# Patient Record
Sex: FEMALE | Race: BLACK OR AFRICAN AMERICAN | Employment: UNEMPLOYED | URBAN - METROPOLITAN AREA
[De-identification: names, ages, dates, MRNs, and addresses within clinical notes are randomized per-mention and may not be internally consistent; named-entity substitution may affect disease eponyms.]

---

## 2013-01-16 LAB — PAP SMEAR, EXTERNAL: NORMAL

## 2017-01-19 ENCOUNTER — OFFICE VISIT (OUTPATIENT)
Dept: INTERNAL MEDICINE CLINIC | Age: 62
End: 2017-01-19

## 2017-01-19 VITALS
TEMPERATURE: 97.9 F | WEIGHT: 149 LBS | BODY MASS INDEX: 28.13 KG/M2 | HEIGHT: 61 IN | SYSTOLIC BLOOD PRESSURE: 104 MMHG | HEART RATE: 68 BPM | DIASTOLIC BLOOD PRESSURE: 64 MMHG

## 2017-01-19 DIAGNOSIS — L03.116 CELLULITIS OF LEFT FOOT: Primary | ICD-10-CM

## 2017-01-19 DIAGNOSIS — L29.9 ITCHING: ICD-10-CM

## 2017-01-19 DIAGNOSIS — M79.675 TOE PAIN, LEFT: ICD-10-CM

## 2017-01-19 PROCEDURE — 99213 OFFICE O/P EST LOW 20 MIN: CPT | Performed by: INTERNAL MEDICINE

## 2017-01-23 PROBLEM — L29.9 ITCHING: Status: ACTIVE | Noted: 2017-01-23

## 2017-01-23 PROBLEM — M79.675 TOE PAIN, LEFT: Status: ACTIVE | Noted: 2017-01-23

## 2017-01-23 ASSESSMENT — ENCOUNTER SYMPTOMS: COLOR CHANGE: 1

## 2017-02-07 ENCOUNTER — OFFICE VISIT (OUTPATIENT)
Dept: INTERNAL MEDICINE CLINIC | Age: 62
End: 2017-02-07

## 2017-02-07 VITALS
SYSTOLIC BLOOD PRESSURE: 104 MMHG | TEMPERATURE: 97.8 F | RESPIRATION RATE: 12 BRPM | WEIGHT: 145.4 LBS | DIASTOLIC BLOOD PRESSURE: 68 MMHG | OXYGEN SATURATION: 98 % | BODY MASS INDEX: 27.45 KG/M2 | HEIGHT: 61 IN | HEART RATE: 87 BPM

## 2017-02-07 DIAGNOSIS — E78.5 HYPERLIPIDEMIA, UNSPECIFIED HYPERLIPIDEMIA TYPE: ICD-10-CM

## 2017-02-07 DIAGNOSIS — E11.42 TYPE 2 DIABETES MELLITUS WITH PERIPHERAL NEUROPATHY (HCC): ICD-10-CM

## 2017-02-07 DIAGNOSIS — L29.9 ITCHING: ICD-10-CM

## 2017-02-07 DIAGNOSIS — M25.422 ELBOW SWELLING, LEFT: ICD-10-CM

## 2017-02-07 DIAGNOSIS — I10 ESSENTIAL HYPERTENSION: Primary | ICD-10-CM

## 2017-02-07 DIAGNOSIS — L02.92 BOIL: ICD-10-CM

## 2017-02-07 PROCEDURE — 99214 OFFICE O/P EST MOD 30 MIN: CPT | Performed by: INTERNAL MEDICINE

## 2017-02-07 RX ORDER — LORATADINE 10 MG/1
10 TABLET ORAL DAILY
Qty: 30 TABLET | Refills: 1 | Status: SHIPPED | OUTPATIENT
Start: 2017-02-07 | End: 2018-04-30

## 2017-02-07 RX ORDER — CEPHALEXIN 500 MG/1
500 CAPSULE ORAL 2 TIMES DAILY
Qty: 20 CAPSULE | Refills: 0 | Status: SHIPPED | OUTPATIENT
Start: 2017-02-07 | End: 2017-02-17

## 2017-02-09 ASSESSMENT — ENCOUNTER SYMPTOMS
RHINORRHEA: 0
COUGH: 0
SORE THROAT: 0
ABDOMINAL PAIN: 0
SINUS PRESSURE: 0
CONSTIPATION: 0
DIARRHEA: 0
VOMITING: 0
SHORTNESS OF BREATH: 0
NAUSEA: 0
CHEST TIGHTNESS: 0
WHEEZING: 0
BLOOD IN STOOL: 0

## 2017-02-16 LAB — DIABETIC RETINOPATHY: NORMAL

## 2017-02-21 ENCOUNTER — OFFICE VISIT (OUTPATIENT)
Dept: INTERNAL MEDICINE CLINIC | Age: 62
End: 2017-02-21

## 2017-02-21 VITALS
DIASTOLIC BLOOD PRESSURE: 68 MMHG | RESPIRATION RATE: 12 BRPM | BODY MASS INDEX: 27.08 KG/M2 | HEIGHT: 61 IN | OXYGEN SATURATION: 98 % | HEART RATE: 74 BPM | TEMPERATURE: 97.8 F | SYSTOLIC BLOOD PRESSURE: 102 MMHG | WEIGHT: 143.4 LBS

## 2017-02-21 DIAGNOSIS — M79.89 SWELLING OF RIGHT HAND: ICD-10-CM

## 2017-02-21 DIAGNOSIS — M79.89 ARM SWELLING: Primary | ICD-10-CM

## 2017-02-21 PROCEDURE — 99213 OFFICE O/P EST LOW 20 MIN: CPT | Performed by: INTERNAL MEDICINE

## 2017-02-27 DIAGNOSIS — E11.42 TYPE 2 DIABETES MELLITUS WITH PERIPHERAL NEUROPATHY (HCC): ICD-10-CM

## 2017-02-27 RX ORDER — SITAGLIPTIN 100 MG/1
TABLET, FILM COATED ORAL
Qty: 90 TABLET | Refills: 1 | Status: SHIPPED | OUTPATIENT
Start: 2017-02-27 | End: 2017-08-26 | Stop reason: SDUPTHER

## 2017-03-12 ENCOUNTER — TELEPHONE (OUTPATIENT)
Dept: INTERNAL MEDICINE CLINIC | Age: 62
End: 2017-03-12

## 2017-03-12 DIAGNOSIS — E78.5 HYPERLIPIDEMIA, UNSPECIFIED HYPERLIPIDEMIA TYPE: ICD-10-CM

## 2017-03-12 DIAGNOSIS — E11.42 TYPE 2 DIABETES MELLITUS WITH PERIPHERAL NEUROPATHY (HCC): Primary | ICD-10-CM

## 2017-03-13 RX ORDER — PIOGLITAZONEHYDROCHLORIDE 30 MG/1
TABLET ORAL
Qty: 90 TABLET | Refills: 1 | Status: SHIPPED | OUTPATIENT
Start: 2017-03-13 | End: 2017-09-09 | Stop reason: SDUPTHER

## 2017-03-13 RX ORDER — GLIMEPIRIDE 4 MG/1
TABLET ORAL
Qty: 180 TABLET | Refills: 1 | Status: SHIPPED | OUTPATIENT
Start: 2017-03-13 | End: 2017-09-09 | Stop reason: SDUPTHER

## 2017-03-13 RX ORDER — ROSUVASTATIN CALCIUM 20 MG/1
TABLET, COATED ORAL
Qty: 90 TABLET | Refills: 1 | Status: SHIPPED | OUTPATIENT
Start: 2017-03-13 | End: 2017-03-13 | Stop reason: SDUPTHER

## 2017-03-13 RX ORDER — GABAPENTIN 300 MG/1
CAPSULE ORAL
Qty: 90 CAPSULE | Refills: 1 | Status: SHIPPED | OUTPATIENT
Start: 2017-03-13 | End: 2017-09-09 | Stop reason: SDUPTHER

## 2017-03-13 RX ORDER — ROSUVASTATIN CALCIUM 20 MG/1
TABLET, COATED ORAL
Qty: 90 TABLET | Refills: 1 | Status: SHIPPED | OUTPATIENT
Start: 2017-03-13 | End: 2017-09-09 | Stop reason: SDUPTHER

## 2017-03-13 RX ORDER — GLIMEPIRIDE 4 MG/1
TABLET ORAL
Qty: 180 TABLET | Refills: 1 | Status: SHIPPED | OUTPATIENT
Start: 2017-03-13 | End: 2017-03-13 | Stop reason: SDUPTHER

## 2017-03-13 RX ORDER — LISINOPRIL 30 MG/1
TABLET ORAL
Qty: 90 TABLET | Refills: 1 | Status: SHIPPED | OUTPATIENT
Start: 2017-03-13 | End: 2017-09-09 | Stop reason: SDUPTHER

## 2017-08-26 DIAGNOSIS — E11.42 TYPE 2 DIABETES MELLITUS WITH PERIPHERAL NEUROPATHY (HCC): ICD-10-CM

## 2017-08-28 RX ORDER — SITAGLIPTIN 100 MG/1
TABLET, FILM COATED ORAL
Qty: 90 TABLET | Refills: 0 | Status: SHIPPED | OUTPATIENT
Start: 2017-08-28 | End: 2017-11-26 | Stop reason: SDUPTHER

## 2017-09-09 DIAGNOSIS — E11.42 TYPE 2 DIABETES MELLITUS WITH PERIPHERAL NEUROPATHY (HCC): ICD-10-CM

## 2017-09-09 DIAGNOSIS — E78.5 HYPERLIPIDEMIA, UNSPECIFIED HYPERLIPIDEMIA TYPE: ICD-10-CM

## 2017-09-12 ENCOUNTER — OFFICE VISIT (OUTPATIENT)
Dept: INTERNAL MEDICINE CLINIC | Age: 62
End: 2017-09-12

## 2017-09-12 VITALS
HEART RATE: 80 BPM | HEIGHT: 61 IN | OXYGEN SATURATION: 98 % | RESPIRATION RATE: 12 BRPM | BODY MASS INDEX: 27.3 KG/M2 | SYSTOLIC BLOOD PRESSURE: 130 MMHG | DIASTOLIC BLOOD PRESSURE: 70 MMHG | WEIGHT: 144.6 LBS | TEMPERATURE: 98.3 F

## 2017-09-12 DIAGNOSIS — M79.89 LEFT ARM SWELLING: Primary | ICD-10-CM

## 2017-09-12 DIAGNOSIS — L29.9 ITCHING: ICD-10-CM

## 2017-09-12 PROCEDURE — 99213 OFFICE O/P EST LOW 20 MIN: CPT | Performed by: INTERNAL MEDICINE

## 2017-09-12 RX ORDER — GLIMEPIRIDE 4 MG/1
TABLET ORAL
Qty: 180 TABLET | Refills: 0 | Status: SHIPPED | OUTPATIENT
Start: 2017-09-12 | End: 2017-12-11 | Stop reason: SDUPTHER

## 2017-09-12 RX ORDER — GABAPENTIN 300 MG/1
CAPSULE ORAL
Qty: 90 CAPSULE | Refills: 0 | Status: SHIPPED | OUTPATIENT
Start: 2017-09-12 | End: 2017-10-05

## 2017-09-12 RX ORDER — LISINOPRIL 30 MG/1
TABLET ORAL
Qty: 90 TABLET | Refills: 0 | Status: SHIPPED | OUTPATIENT
Start: 2017-09-12 | End: 2017-12-11 | Stop reason: SDUPTHER

## 2017-09-12 RX ORDER — ROSUVASTATIN CALCIUM 20 MG/1
TABLET, COATED ORAL
Qty: 90 TABLET | Refills: 0 | Status: SHIPPED | OUTPATIENT
Start: 2017-09-12 | End: 2018-03-11 | Stop reason: SDUPTHER

## 2017-09-12 RX ORDER — PIOGLITAZONEHYDROCHLORIDE 30 MG/1
TABLET ORAL
Qty: 90 TABLET | Refills: 0 | Status: SHIPPED | OUTPATIENT
Start: 2017-09-12 | End: 2017-10-05

## 2017-09-12 ASSESSMENT — PATIENT HEALTH QUESTIONNAIRE - PHQ9
SUM OF ALL RESPONSES TO PHQ9 QUESTIONS 1 & 2: 0
SUM OF ALL RESPONSES TO PHQ QUESTIONS 1-9: 0
1. LITTLE INTEREST OR PLEASURE IN DOING THINGS: 0
2. FEELING DOWN, DEPRESSED OR HOPELESS: 0

## 2017-09-14 PROBLEM — M79.89 LEFT ARM SWELLING: Status: ACTIVE | Noted: 2017-09-14

## 2017-09-14 ASSESSMENT — ENCOUNTER SYMPTOMS: COLOR CHANGE: 1

## 2017-10-04 ENCOUNTER — TELEPHONE (OUTPATIENT)
Dept: INTERNAL MEDICINE CLINIC | Age: 62
End: 2017-10-04

## 2017-10-05 ENCOUNTER — OFFICE VISIT (OUTPATIENT)
Dept: INTERNAL MEDICINE CLINIC | Age: 62
End: 2017-10-05

## 2017-10-05 VITALS
RESPIRATION RATE: 12 BRPM | BODY MASS INDEX: 27.11 KG/M2 | OXYGEN SATURATION: 99 % | HEART RATE: 82 BPM | HEIGHT: 61 IN | SYSTOLIC BLOOD PRESSURE: 122 MMHG | WEIGHT: 143.6 LBS | DIASTOLIC BLOOD PRESSURE: 64 MMHG | TEMPERATURE: 98.1 F

## 2017-10-05 DIAGNOSIS — I10 ESSENTIAL HYPERTENSION: ICD-10-CM

## 2017-10-05 DIAGNOSIS — Z11.59 NEED FOR HEPATITIS C SCREENING TEST: ICD-10-CM

## 2017-10-05 DIAGNOSIS — Z23 NEED FOR INFLUENZA VACCINATION: ICD-10-CM

## 2017-10-05 DIAGNOSIS — E78.2 MIXED HYPERLIPIDEMIA: ICD-10-CM

## 2017-10-05 DIAGNOSIS — E78.5 HYPERLIPIDEMIA, UNSPECIFIED HYPERLIPIDEMIA TYPE: ICD-10-CM

## 2017-10-05 DIAGNOSIS — E11.42 TYPE 2 DIABETES MELLITUS WITH PERIPHERAL NEUROPATHY (HCC): ICD-10-CM

## 2017-10-05 DIAGNOSIS — Z12.31 ENCOUNTER FOR SCREENING MAMMOGRAM FOR BREAST CANCER: ICD-10-CM

## 2017-10-05 DIAGNOSIS — E11.42 TYPE 2 DIABETES MELLITUS WITH PERIPHERAL NEUROPATHY (HCC): Primary | ICD-10-CM

## 2017-10-05 DIAGNOSIS — Z23 NEED FOR PROPHYLACTIC VACCINATION AGAINST STREPTOCOCCUS PNEUMONIAE (PNEUMOCOCCUS): ICD-10-CM

## 2017-10-05 LAB
A/G RATIO: 1.3 (ref 1.1–2.2)
ALBUMIN SERPL-MCNC: 4.5 G/DL (ref 3.4–5)
ALP BLD-CCNC: 73 U/L (ref 40–129)
ALT SERPL-CCNC: 21 U/L (ref 10–40)
ANION GAP SERPL CALCULATED.3IONS-SCNC: 13 MMOL/L (ref 3–16)
AST SERPL-CCNC: 27 U/L (ref 15–37)
BILIRUB SERPL-MCNC: 0.4 MG/DL (ref 0–1)
BUN BLDV-MCNC: 15 MG/DL (ref 7–20)
CALCIUM SERPL-MCNC: 10 MG/DL (ref 8.3–10.6)
CHLORIDE BLD-SCNC: 99 MMOL/L (ref 99–110)
CHOLESTEROL, TOTAL: 243 MG/DL (ref 0–199)
CO2: 28 MMOL/L (ref 21–32)
CREAT SERPL-MCNC: 1.1 MG/DL (ref 0.6–1.1)
GFR AFRICAN AMERICAN: >60
GFR NON-AFRICAN AMERICAN: 51
GLOBULIN: 3.6 G/DL
GLUCOSE BLD-MCNC: 124 MG/DL (ref 70–99)
HDLC SERPL-MCNC: 59 MG/DL (ref 40–60)
HEPATITIS C ANTIBODY INTERPRETATION: NORMAL
LDL CHOLESTEROL CALCULATED: 156 MG/DL
POTASSIUM SERPL-SCNC: 4.3 MMOL/L (ref 3.5–5.1)
SODIUM BLD-SCNC: 140 MMOL/L (ref 136–145)
TOTAL PROTEIN: 8.1 G/DL (ref 6.4–8.2)
TRIGL SERPL-MCNC: 141 MG/DL (ref 0–150)
VLDLC SERPL CALC-MCNC: 28 MG/DL

## 2017-10-05 PROCEDURE — 90732 PPSV23 VACC 2 YRS+ SUBQ/IM: CPT | Performed by: INTERNAL MEDICINE

## 2017-10-05 PROCEDURE — 90630 INFLUENZA, QUADV, 18-64 YRS, ID, PF, MICRO INJ, 0.1ML (FLUZONE QUADV, PF): CPT | Performed by: INTERNAL MEDICINE

## 2017-10-05 PROCEDURE — 99214 OFFICE O/P EST MOD 30 MIN: CPT | Performed by: INTERNAL MEDICINE

## 2017-10-05 PROCEDURE — 90471 IMMUNIZATION ADMIN: CPT | Performed by: INTERNAL MEDICINE

## 2017-10-05 PROCEDURE — 90472 IMMUNIZATION ADMIN EACH ADD: CPT | Performed by: INTERNAL MEDICINE

## 2017-10-05 ASSESSMENT — ENCOUNTER SYMPTOMS
DIARRHEA: 0
SORE THROAT: 0
NAUSEA: 0
ABDOMINAL PAIN: 0
SINUS PRESSURE: 0
WHEEZING: 0
CONSTIPATION: 0
SHORTNESS OF BREATH: 0
VOMITING: 0
BLOOD IN STOOL: 0
CHEST TIGHTNESS: 0
RHINORRHEA: 0
COUGH: 0

## 2017-10-05 NOTE — MR AVS SNAPSHOT
After Visit Summary             Daleen Sicard   10/5/2017 3:30 PM   Office Visit    Description:  Female : 1955   Provider:  Yuliya Corrales MD   Department:  [address] [place] [address] and Future Appointments         Below is a list of your follow-up and future appointments. This may not be a complete list as you may have made appointments directly with providers that we are not aware of or your providers may have made some for you. Please call your providers to confirm appointments. It is important to keep your appointments. Please bring your current insurance card, photo ID, co-pay, and all medication bottles to your appointment. If self-pay, payment is expected at the time of service. Your To-Do List     Future Orders Complete By Expires    Hepatitis C Antibody [RTG664 Custom]  10/5/2017 (Approximate) 10/5/2018    MATTHIAS Screening Bilateral [ Custom]  10/5/2017 (Approximate) 2018    Microalbumin / creatinine urine ratio [UNU167 Custom]  2017 10/5/2018    Follow-Up    Return in about 4 months (around 2018) for annual physical exam.         Information from Your Visit        Department     Name Address Phone Fax    Lance Reed [address] [facility]3], [address] [place] [id number] [phone]      You Were Seen for:         Comments    Type 2 diabetes mellitus with peripheral neuropathy [facility])   [[id number]]         Vital Signs     Blood Pressure Pulse Temperature Respirations Height Weight    122/64 (Site: Left Arm, Position: Sitting, Cuff Size: Medium Adult) 82 98.1 °F (36.7 °C) (Oral) 12 5' 1\" (1.549 m) 143 lb 9.6 oz (65.1 kg)    Oxygen Saturation Body Mass Index Smoking Status             99% 27.13 kg/m2 Never Smoker         Additional Information about your Body Mass Index (BMI)           Your BMI as listed above is considered overweight (25.0-29.9).  BMI is an · Some people get severe pain in the shoulder and have difficulty moving the arm where a shot was given. This happens very rarely. · Any medication can cause a severe allergic reaction. Such reactions from a vaccine are very rare, estimated at about 1 in a million doses, and would happen within a few minutes to a few hours after the vaccination. As with any medicine, there is a very remote chance of a vaccine causing a serious injury or death. The safety of vaccines is always being monitored. For more information, visit: www.cdc.gov/vaccinesafety/  What if there is a serious reaction? What should I look for? Look for anything that concerns you, such as signs of a severe allergic reaction, very high fever, or unusual behavior. Signs of a severe allergic reaction can include hives, swelling of the face and throat, difficulty breathing, a fast heartbeat, dizziness, and weakness. These would usually start a few minutes to a few hours after the vaccination. What should I do? If you think it is a severe allergic reaction or other emergency that can't wait, call 9-1-1 or get to the nearest hospital. Otherwise, call your doctor. Afterward, the reaction should be reported to the Vaccine Adverse Event Reporting System (VAERS). Your doctor might file this report, or you can do it yourself through the VAERS web site at www.vaers. hhs.gov, or by calling 8-447.698.3080. VAERS does not give medical advice. How can I learn more? · Ask your doctor. He or she can give you the vaccine package insert or suggest other sources of information. · Call your local or state health department.   · Contact the Centers for Disease Control and Prevention (CDC):  ¨ Call 6-280.193.8806 (1-800-CDC-INFO) or  ¨ Visit CDC's website at www.cdc.gov/vaccines  Vaccine Information Statement  PPSV Vaccine  (04/24/2015)  Department of Health and Human Services  Centers for Disease Control and Prevention Date Of Birth Sex Race Ethnicity Preferred Language    1955 Female Black Non-/Non  English      Problem List as of 10/5/2017  Date Reviewed: 9/13/2017                Left arm swelling    Toe pain, left    Itching    Rash    Type 2 diabetes mellitus with peripheral neuropathy ([facility]    Routine general medical examination at a health care facility    Trigger finger    Microalbuminuria    Thumb pain    Essential hypertension    Diabetes mellitus, type II ([facility]    Other and unspecified hyperlipidemia    Iron deficiency anemia    Low back pain      Your Goals as of 10/5/2017 at 4:13 PM                 Exercise    DM: Exercise 3x per week (30 min per time)     Notes    Patient Self-Management Goal for Chronic Condition  Goal: I will exercise for 45 minutes, 3-5 days per week. Barriers to success: none  Plan for overcoming my barriers: N/A     Confidence: 10/10  Date goal set: 07/07/2015  Date goal attained:          Weight    Weight (lb) < 130     Notes    2/7/2017: DM    Barriers to success: time constraints  Plan for overcoming my barriers: time management  Confidence: 10/10  Date goal set: 02/07/2017  Date goal attained:             Immunizations as of 10/5/2017     Name Date    Influenza Virus Vaccine 12/18/2014, 9/17/2013, 9/10/2012    Influenza Whole 10/18/2011    Influenza, Intradermal, Preservative free 10/26/2015    Influenza, Intradermal, Quadrivalent, Preservative Free 10/13/2016    Pneumococcal 13-valent Conjugate (Murel Saravia) 10/26/2015    Tdap (Boostrix, Adacel) 12/10/2012      Preventive Care        Date Due    Hepatitis C screening is recommended for all adults regardless of risk factors born between [facility] at least once (lifetime) who have never been tested.  1955    Pneumococcal Vaccine - Pneumovax for adults aged 19-64 years with: chronic heart disease, chronic lung disease, diabetes mellitus, alcoholism, chronic liver disease, or cigarette smoking. (1 of 1 - PPSV23) 8. Enter your e-mail address. You will receive e-mail notification when new information is available in [address] 9. Click Sign Up. You can now view your medical record. Additional Information  If you have questions, please contact the physician practice where you receive care. Remember, [employer] is NOT to be used for urgent needs. For medical emergencies, dial 911. For questions regarding your [employer] account call [phone]. If you have a clinical question, please call your doctor's office.

## 2017-10-05 NOTE — PATIENT INSTRUCTIONS
bacteria that can spread from person to person through close contact. It can cause ear infections, and it can also lead to more serious infections of the:  · Lungs (pneumonia),  · Blood (bacteremia), and  · Covering of the brain and spinal cord (meningitis). Meningitis can cause deafness and brain damage, and it can be fatal.  Anyone can get pneumococcal disease, but children under 3years of age, people with certain medical conditions, adults over 72years of age, and cigarette smokers are at the highest risk. About 18,000 older adults die each year from pneumococcal disease in the [place]. Treatment of pneumococcal infections with penicillin and other drugs used to be more effective. But some strains of the disease have become resistant to these drugs. This makes prevention of the disease, through vaccination, even more important. Pneumococcal polysaccharide vaccine (PPSV23)  Pneumococcal polysaccharide vaccine (PPSV23) protects against 23 types of pneumococcal bacteria. It will not prevent all pneumococcal disease. PPSV23 is recommended for:  · All adults 72years of age and older,  · Anyone 2 through 59years of age with certain long-term health problems,  · Anyone 2 through 59years of age with a weakened immune system,  · Adults 23 through 59years of age who smoke cigarettes or have asthma. Most people need only one dose of PPSV. A second dose is recommended for certain high-risk groups. People 72 and older should get a dose even if they have gotten one or more doses of the vaccine before they turned 65. Your healthcare provider can give you more information about these recommendations. Most healthy adults develop protection within 2 to 3 weeks of getting the shot. Some people should not get this vaccine  · Anyone who has had a life-threatening allergic reaction to PPSV should not get another dose. · Anyone who has a severe allergy to any component of PPSV should not receive it.  Tell your provider if you have any severe allergies. · Anyone who is moderately or severely ill when the shot is scheduled may be asked to wait until they recover before getting the vaccine. Someone with a mild illness can usually be vaccinated. · Children less than 3years of age should not receive this vaccine. · There is no evidence that PPSV is harmful to either a pregnant woman or to her fetus. However, as a precaution, women who need the vaccine should be vaccinated before becoming pregnant, if possible. Risks of a vaccine reaction  With any medicine, including vaccines, there is a chance of side effects. These are usually mild and go away on their own, but serious reactions are also possible. About half of people who get PPSV have mild side effects, such as redness or pain where the shot is given, which go away within about two days. Less than 1 out of 100 people develop a fever, muscle aches, or more severe local reactions. Problems that could happen after any vaccine:  · People sometimes faint after a medical procedure, including vaccination. Sitting or lying down for about 15 minutes can help prevent fainting, and injuries caused by a fall. Tell your doctor if you feel dizzy, or have vision changes or ringing in the ears. · Some people get severe pain in the shoulder and have difficulty moving the arm where a shot was given. This happens very rarely. · Any medication can cause a severe allergic reaction. Such reactions from a vaccine are very rare, estimated at about 1 in a million doses, and would happen within a few minutes to a few hours after the vaccination. As with any medicine, there is a very remote chance of a vaccine causing a serious injury or death. The safety of vaccines is always being monitored. For more information, visit: www.cdc.gov/vaccinesafety/  What if there is a serious reaction? What should I look for?   Look for anything that concerns you, such as signs of a severe allergic reaction, very high fever, or unusual behavior. Signs of a severe allergic reaction can include hives, swelling of the face and throat, difficulty breathing, a fast heartbeat, dizziness, and weakness. These would usually start a few minutes to a few hours after the vaccination. What should I do? If you think it is a severe allergic reaction or other emergency that can't wait, call 9-1-1 or get to the nearest hospital. Otherwise, call your doctor. Afterward, the reaction should be reported to the Vaccine Adverse Event Reporting System (VAERS). Your doctor might file this report, or you can do it yourself through the VAERS web site at www.vaers. [facility].gov, or by calling 2-999.270.7261. [employer] does not give medical advice. How can I learn more? · Ask your doctor. He or she can give you the vaccine package insert or suggest other sources of information. · Call your local or state health department. · Contact the Centers for Disease Control and Prevention (CDC):  ¨ Call 0-190.875.5018 (1-800-CDC-INFO) or  ¨ Visit CDC's website at www.cdc.gov/vaccines  Vaccine Information Statement  PPSV Vaccine  (04/24/2015)  Department of Health and Human Services  Centers for Disease Control and Prevention  Many Vaccine Information Statements are available in Thai and other languages. See www.immunize.org/vis. Hojas de información Sobre Vacunas están disponibles en español y en muchos otros idiomas. Visite Lucia.si. Care instructions adapted under license by [facility] ([facility]). If you have questions about a medical condition or this instruction, always ask your healthcare professional. [place] [address] any warranty or liability for your use of this information.

## 2017-10-05 NOTE — PROGRESS NOTES
Jonah Schaeffer   YOB: 1955    Date of Visit:  10/5/2017    Chief Complaint   Patient presents with    Diabetes    Hyperlipidemia    Hypertension       HPI  Patient has Type 2 diabetes. Pt monitors her blood sugar fasting and it averages . Pt decreases carbohydrates in her diet. Patient is not exercising. Pt has hyperlipidemia. Pt takes Crestor. Patient denies side effects. Patient decreases fat and cholesterol in her diet. Patient has Hypertension. Patient doesn't monitor her BP. Patient states she doesn't add salt. Patient states she had fasting labs done this morning. Review of Systems   Constitutional: Negative for chills, fatigue and fever. HENT: Negative for congestion, postnasal drip, rhinorrhea, sinus pressure and sore throat. Eyes: Negative for visual disturbance. Respiratory: Negative for cough, chest tightness, shortness of breath and wheezing. Cardiovascular: Negative for chest pain, palpitations and leg swelling. Gastrointestinal: Negative for abdominal pain, blood in stool, constipation, diarrhea, nausea and vomiting. Genitourinary: Negative for dysuria, frequency and hematuria. Musculoskeletal: Negative for arthralgias and myalgias. Skin: Negative for rash and wound. Neurological: Negative for dizziness, tremors, syncope, weakness, light-headedness, numbness and headaches. Psychiatric/Behavioral: Negative for dysphoric mood and sleep disturbance. The patient is not nervous/anxious.         No Known Allergies  Outpatient Prescriptions Marked as Taking for the 10/5/17 encounter (Office Visit) with Tessa Hay MD   Medication Sig Dispense Refill    metFORMIN (GLUCOPHAGE) 1000 MG tablet TAKE 1 TABLET TWICE A DAY WITH MEALS 180 tablet 0    rosuvastatin (CRESTOR) 20 MG tablet TAKE 1 TABLET NIGHTLY 90 tablet 0    lisinopril (PRINIVIL;ZESTRIL) 30 MG tablet TAKE 1 TABLET DAILY 90 tablet 0    glimepiride (AMARYL) 4 MG tablet TAKE 1 TABLET TWICE A  tablet 0    JANUVIA 100 MG tablet TAKE 1 TABLET DAILY 90 tablet 0    loratadine (CLARITIN) 10 MG tablet Take 1 tablet by mouth daily 30 tablet 1    glucose blood VI test strips (ONE TOUCH ULTRA TEST) strip Test twice daily and as needed 200 each 3    aspirin 81 MG tablet Take 1 tablet by mouth daily 30 tablet 11    Multiple Minerals TABS Take  by mouth. Vitals:    10/05/17 1543   BP: 122/64   Site: Left Arm   Position: Sitting   Cuff Size: Medium Adult   Pulse: 82   Resp: 12   Temp: 98.1 °F (36.7 °C)   TempSrc: Oral   SpO2: 99%   Weight: 143 lb 9.6 oz (65.1 kg)   Height: 5' 1\" (1.549 m)     Body mass index is 27.13 kg/m². Physical Exam   Constitutional: She is oriented to person, place, and time. She appears well-developed and well-nourished. No distress. Middle aged BF   HENT:   Mouth/Throat: Oropharynx is clear and moist and mucous membranes are normal.   Eyes: Conjunctivae, EOM and lids are normal. Pupils are equal, round, and reactive to light. Neck: Neck supple. Carotid bruit is not present. No thyromegaly present. Cardiovascular: Normal rate, regular rhythm, S1 normal, S2 normal, normal heart sounds and intact distal pulses. Exam reveals no gallop and no friction rub. No murmur heard. Pulmonary/Chest: Effort normal and breath sounds normal. No respiratory distress. She has no wheezes. She has no rhonchi. She has no rales. Abdominal: Soft. Normal appearance and bowel sounds are normal. She exhibits no distension. There is no hepatosplenomegaly. There is no tenderness. There is no rebound. Musculoskeletal: She exhibits no edema. Lymphadenopathy:        Head (right side): No submandibular adenopathy present. Head (left side): No submandibular adenopathy present. Neurological: She is alert and oriented to person, place, and time. Bilateral foot monofilament exam normal   Skin:   No foot ulcers   Psychiatric: She has a normal mood and affect.    Nursing

## 2017-10-05 NOTE — PROGRESS NOTES
Vaccine Information Sheet, \"Influenza - Inactivated\"  given to Shawn Dahl, or parent/legal guardian of  Shawn Dahl and verbalized understanding. Patient responses:    Have you ever had a reaction to a flu vaccine? No  Are you able to eat eggs without adverse effects? Yes  Do you have any current illness? No  Have you ever had Guillian Baltimore Syndrome? No    Flu vaccine given per order. Please see immunization tab.

## 2017-10-06 LAB
ESTIMATED AVERAGE GLUCOSE: 220.2 MG/DL
HBA1C MFR BLD: 9.3 %

## 2017-11-26 DIAGNOSIS — E11.42 TYPE 2 DIABETES MELLITUS WITH PERIPHERAL NEUROPATHY (HCC): ICD-10-CM

## 2017-11-27 RX ORDER — SITAGLIPTIN 100 MG/1
TABLET, FILM COATED ORAL
Qty: 90 TABLET | Refills: 1 | Status: SHIPPED | OUTPATIENT
Start: 2017-11-27 | End: 2018-05-26 | Stop reason: SDUPTHER

## 2017-11-29 DIAGNOSIS — E11.42 TYPE 2 DIABETES MELLITUS WITH PERIPHERAL NEUROPATHY (HCC): ICD-10-CM

## 2017-12-11 DIAGNOSIS — E11.42 TYPE 2 DIABETES MELLITUS WITH PERIPHERAL NEUROPATHY (HCC): ICD-10-CM

## 2017-12-11 RX ORDER — GLIMEPIRIDE 4 MG/1
TABLET ORAL
Qty: 180 TABLET | Refills: 1 | Status: SHIPPED | OUTPATIENT
Start: 2017-12-11 | End: 2018-06-09 | Stop reason: SDUPTHER

## 2017-12-11 RX ORDER — LISINOPRIL 30 MG/1
TABLET ORAL
Qty: 90 TABLET | Refills: 1 | Status: SHIPPED | OUTPATIENT
Start: 2017-12-11 | End: 2018-06-09 | Stop reason: SDUPTHER

## 2018-04-16 ENCOUNTER — TELEPHONE (OUTPATIENT)
Dept: INTERNAL MEDICINE CLINIC | Age: 63
End: 2018-04-16

## 2018-04-30 ENCOUNTER — OFFICE VISIT (OUTPATIENT)
Dept: INTERNAL MEDICINE CLINIC | Age: 63
End: 2018-04-30

## 2018-04-30 VITALS
BODY MASS INDEX: 26.88 KG/M2 | DIASTOLIC BLOOD PRESSURE: 70 MMHG | RESPIRATION RATE: 14 BRPM | HEART RATE: 74 BPM | WEIGHT: 142.4 LBS | SYSTOLIC BLOOD PRESSURE: 128 MMHG | OXYGEN SATURATION: 90 % | HEIGHT: 61 IN | TEMPERATURE: 97.8 F

## 2018-04-30 DIAGNOSIS — Z13.6 SCREENING FOR ISCHEMIC HEART DISEASE: ICD-10-CM

## 2018-04-30 DIAGNOSIS — D50.9 IRON DEFICIENCY ANEMIA, UNSPECIFIED IRON DEFICIENCY ANEMIA TYPE: ICD-10-CM

## 2018-04-30 DIAGNOSIS — Z00.00 ANNUAL PHYSICAL EXAM: Primary | ICD-10-CM

## 2018-04-30 DIAGNOSIS — I10 ESSENTIAL HYPERTENSION: ICD-10-CM

## 2018-04-30 DIAGNOSIS — Z23 NEED FOR SHINGLES VACCINE: ICD-10-CM

## 2018-04-30 DIAGNOSIS — E11.42 TYPE 2 DIABETES MELLITUS WITH PERIPHERAL NEUROPATHY (HCC): ICD-10-CM

## 2018-04-30 DIAGNOSIS — R82.998 LEUKOCYTES IN URINE: ICD-10-CM

## 2018-04-30 DIAGNOSIS — E78.2 MIXED HYPERLIPIDEMIA: ICD-10-CM

## 2018-04-30 DIAGNOSIS — M79.641 RIGHT HAND PAIN: ICD-10-CM

## 2018-04-30 PROBLEM — M79.89 LEFT ARM SWELLING: Status: RESOLVED | Noted: 2017-09-14 | Resolved: 2018-04-30

## 2018-04-30 PROBLEM — M79.675 TOE PAIN, LEFT: Status: RESOLVED | Noted: 2017-01-23 | Resolved: 2018-04-30

## 2018-04-30 PROBLEM — L29.9 ITCHING: Status: RESOLVED | Noted: 2017-01-23 | Resolved: 2018-04-30

## 2018-04-30 LAB
BILIRUBIN, POC: ABNORMAL
BLOOD URINE, POC: ABNORMAL
CLARITY, POC: CLEAR
COLOR, POC: YELLOW
CREATININE URINE: 141.3 MG/DL (ref 28–259)
GLUCOSE URINE, POC: ABNORMAL
HBA1C MFR BLD: 8.9 %
KETONES, POC: ABNORMAL
LEUKOCYTE EST, POC: ABNORMAL
MICROALBUMIN UR-MCNC: 5.3 MG/DL
MICROALBUMIN/CREAT UR-RTO: 37.5 MG/G (ref 0–30)
NITRITE, POC: ABNORMAL
PH, POC: 6
PROTEIN, POC: ABNORMAL
SPECIFIC GRAVITY, POC: 1.02
UROBILINOGEN, POC: 0.2

## 2018-04-30 PROCEDURE — 93000 ELECTROCARDIOGRAM COMPLETE: CPT | Performed by: INTERNAL MEDICINE

## 2018-04-30 PROCEDURE — 83036 HEMOGLOBIN GLYCOSYLATED A1C: CPT | Performed by: INTERNAL MEDICINE

## 2018-04-30 PROCEDURE — 99396 PREV VISIT EST AGE 40-64: CPT | Performed by: INTERNAL MEDICINE

## 2018-04-30 PROCEDURE — 81002 URINALYSIS NONAUTO W/O SCOPE: CPT | Performed by: INTERNAL MEDICINE

## 2018-04-30 RX ORDER — ROSUVASTATIN CALCIUM 20 MG/1
TABLET, COATED ORAL
Qty: 90 TABLET | Refills: 1 | Status: SHIPPED | OUTPATIENT
Start: 2018-04-30 | End: 2018-11-15 | Stop reason: SDUPTHER

## 2018-04-30 ASSESSMENT — ENCOUNTER SYMPTOMS
FACIAL SWELLING: 0
SINUS PRESSURE: 0
COUGH: 0
BLOOD IN STOOL: 0
EYE DISCHARGE: 0
EYE PAIN: 0
VOICE CHANGE: 0
RHINORRHEA: 0
TROUBLE SWALLOWING: 0
CHEST TIGHTNESS: 0
SHORTNESS OF BREATH: 0
APNEA: 0
WHEEZING: 0
ABDOMINAL PAIN: 0
CONSTIPATION: 0
DIARRHEA: 0
VOMITING: 0
NAUSEA: 0
SORE THROAT: 0
RECTAL PAIN: 0
ABDOMINAL DISTENTION: 0
PHOTOPHOBIA: 0
EYE REDNESS: 0
BACK PAIN: 0
ANAL BLEEDING: 0
CHOKING: 0
EYE ITCHING: 0

## 2018-05-02 LAB — URINE CULTURE, ROUTINE: NORMAL

## 2018-05-26 DIAGNOSIS — E11.42 TYPE 2 DIABETES MELLITUS WITH PERIPHERAL NEUROPATHY (HCC): ICD-10-CM

## 2018-05-30 RX ORDER — SITAGLIPTIN 100 MG/1
TABLET, FILM COATED ORAL
Qty: 90 TABLET | Refills: 1 | Status: SHIPPED | OUTPATIENT
Start: 2018-05-30 | End: 2018-11-26 | Stop reason: SDUPTHER

## 2018-06-09 DIAGNOSIS — E11.42 TYPE 2 DIABETES MELLITUS WITH PERIPHERAL NEUROPATHY (HCC): ICD-10-CM

## 2018-06-11 RX ORDER — LISINOPRIL 30 MG/1
TABLET ORAL
Qty: 90 TABLET | Refills: 1 | Status: SHIPPED | OUTPATIENT
Start: 2018-06-11 | End: 2018-12-08 | Stop reason: SDUPTHER

## 2018-06-11 RX ORDER — GLIMEPIRIDE 4 MG/1
TABLET ORAL
Qty: 180 TABLET | Refills: 1 | Status: SHIPPED | OUTPATIENT
Start: 2018-06-11 | End: 2018-12-08 | Stop reason: SDUPTHER

## 2018-08-07 ENCOUNTER — OFFICE VISIT (OUTPATIENT)
Dept: INTERNAL MEDICINE CLINIC | Age: 63
End: 2018-08-07

## 2018-08-07 VITALS
DIASTOLIC BLOOD PRESSURE: 68 MMHG | SYSTOLIC BLOOD PRESSURE: 100 MMHG | HEART RATE: 79 BPM | WEIGHT: 143 LBS | HEIGHT: 61 IN | BODY MASS INDEX: 27 KG/M2

## 2018-08-07 DIAGNOSIS — R80.9 MICROALBUMINURIA: ICD-10-CM

## 2018-08-07 DIAGNOSIS — E78.2 MIXED HYPERLIPIDEMIA: ICD-10-CM

## 2018-08-07 DIAGNOSIS — I10 ESSENTIAL HYPERTENSION: ICD-10-CM

## 2018-08-07 DIAGNOSIS — E11.42 TYPE 2 DIABETES MELLITUS WITH PERIPHERAL NEUROPATHY (HCC): Primary | ICD-10-CM

## 2018-08-07 LAB — HBA1C MFR BLD: 8.3 %

## 2018-08-07 PROCEDURE — 83036 HEMOGLOBIN GLYCOSYLATED A1C: CPT | Performed by: INTERNAL MEDICINE

## 2018-08-07 PROCEDURE — 99214 OFFICE O/P EST MOD 30 MIN: CPT | Performed by: INTERNAL MEDICINE

## 2018-08-14 DIAGNOSIS — E11.42 TYPE 2 DIABETES MELLITUS WITH PERIPHERAL NEUROPATHY (HCC): Primary | ICD-10-CM

## 2018-08-14 ASSESSMENT — ENCOUNTER SYMPTOMS
ABDOMINAL PAIN: 0
SHORTNESS OF BREATH: 0
RHINORRHEA: 0
CONSTIPATION: 0
WHEEZING: 0
SORE THROAT: 0
VOMITING: 0
BLOOD IN STOOL: 0
SINUS PRESSURE: 0
NAUSEA: 0
CHEST TIGHTNESS: 0
DIARRHEA: 0
COUGH: 0

## 2018-08-14 NOTE — PROGRESS NOTES
Vivian Rouqe   YOB: 1955    Date of Visit:  8/7/2018    Chief Complaint   Patient presents with    Diabetes    Hypertension    Hyperlipidemia       HPI  Diabetes Mellitus Type 2:   Current Medications: Metformin 1000mg po bid, Glimepirid 4mg po bid, and Januvia 100mg po q day. Pt states she has been better about taking Metformin and Glimepiride twice daily as prescribed since last visit but states she is better with taking medications in the morning overall. Diet: pt decreases carbohydrates  Home blood sugar records: fasting range: 125, patient tests 1 time(s) per day  Any episodes of hypoglycemia? no  Eye exam current (within one year): yes on 2/16/18  Daily Aspirin? Yes    Hypertension:    Current Medications: Lisinopril 30mg once daily  Home blood pressure monitoring: No.    Patient is adherent to a low sodium diet. Antihypertensive medication side effects: no medication side effects noted. Hyperlipidemia:  Patient states she hasn't been taking Rosuvastatin because she is better with taking morning medications. Diet: decreases fat and cholesterol     Current exercise: no regular exercise     Review of Systems   Constitutional: Negative for chills, fatigue and fever. HENT: Negative for congestion, postnasal drip, rhinorrhea, sinus pressure and sore throat. Eyes: Negative for visual disturbance. Respiratory: Negative for cough, chest tightness, shortness of breath and wheezing. Cardiovascular: Negative for chest pain, palpitations and leg swelling. Gastrointestinal: Negative for abdominal pain, blood in stool, constipation, diarrhea, nausea and vomiting. Genitourinary: Negative for dysuria, frequency and hematuria. Musculoskeletal: Negative for arthralgias and myalgias. Skin: Negative for rash and wound. Neurological: Negative for dizziness, tremors, syncope, weakness, light-headedness, numbness and headaches.    Psychiatric/Behavioral: Negative for dysphoric exhibits no edema. Lymphadenopathy:        Head (right side): No submandibular adenopathy present. Head (left side): No submandibular adenopathy present. Neurological: She is alert and oriented to person, place, and time. Psychiatric: She has a normal mood and affect. Nursing note reviewed.         Results for POC orders placed in visit on 08/07/18   POCT glycosylated hemoglobin (Hb A1C)   Result Value Ref Range    Hemoglobin A1C 8.3 %     Lab Review   Orders Only on 04/30/2018   Component Date Value    Cholesterol, Total 04/30/2018 278*    Triglycerides 04/30/2018 122     HDL 04/30/2018 64*    LDL Calculated 04/30/2018 190*    VLDL Cholesterol Calcula* 04/30/2018 24     WBC 04/30/2018 6.4     RBC 04/30/2018 3.93*    Hemoglobin 04/30/2018 10.6*    Hematocrit 04/30/2018 32.4*    MCV 04/30/2018 82.5     MCH 04/30/2018 26.9     MCHC 04/30/2018 32.6     RDW 04/30/2018 13.7     Platelets 94/98/7318 323     MPV 04/30/2018 7.2     Neutrophils % 04/30/2018 45.1     Lymphocytes % 04/30/2018 44.5     Monocytes % 04/30/2018 7.9     Eosinophils % 04/30/2018 1.8     Basophils % 04/30/2018 0.7     Neutrophils # 04/30/2018 2.9     Lymphocytes # 04/30/2018 2.9     Monocytes # 04/30/2018 0.5     Eosinophils # 04/30/2018 0.1     Basophils # 04/30/2018 0.0     TSH 04/30/2018 2.13     Sodium 04/30/2018 142     Potassium 04/30/2018 4.6     Chloride 04/30/2018 102     CO2 04/30/2018 24     Anion Gap 04/30/2018 16     Glucose 04/30/2018 173*    BUN 04/30/2018 17     CREATININE 04/30/2018 0.9     GFR Non- 04/30/2018 >60     GFR  04/30/2018 >60     Calcium 04/30/2018 9.8     Total Protein 04/30/2018 8.0     Alb 04/30/2018 4.7     Albumin/Globulin Ratio 04/30/2018 1.4     Total Bilirubin 04/30/2018 0.3     Alkaline Phosphatase 04/30/2018 60     ALT 04/30/2018 21     AST 04/30/2018 27     Globulin 04/30/2018 3.3     Vit D, 25-Hydroxy 04/30/2018 42.8

## 2018-11-26 DIAGNOSIS — E11.42 TYPE 2 DIABETES MELLITUS WITH PERIPHERAL NEUROPATHY (HCC): ICD-10-CM

## 2018-11-26 RX ORDER — SITAGLIPTIN 100 MG/1
TABLET, FILM COATED ORAL
Qty: 90 TABLET | Refills: 1 | Status: ON HOLD | OUTPATIENT
Start: 2018-11-26 | End: 2019-05-07 | Stop reason: HOSPADM

## 2018-12-04 ENCOUNTER — OFFICE VISIT (OUTPATIENT)
Dept: INTERNAL MEDICINE CLINIC | Age: 63
End: 2018-12-04
Payer: COMMERCIAL

## 2018-12-04 VITALS
BODY MASS INDEX: 27.56 KG/M2 | WEIGHT: 146 LBS | SYSTOLIC BLOOD PRESSURE: 118 MMHG | DIASTOLIC BLOOD PRESSURE: 68 MMHG | HEART RATE: 90 BPM | HEIGHT: 61 IN | OXYGEN SATURATION: 98 %

## 2018-12-04 DIAGNOSIS — I10 ESSENTIAL HYPERTENSION: ICD-10-CM

## 2018-12-04 DIAGNOSIS — Z23 NEED FOR SHINGLES VACCINE: ICD-10-CM

## 2018-12-04 DIAGNOSIS — E78.2 MIXED HYPERLIPIDEMIA: ICD-10-CM

## 2018-12-04 LAB — HBA1C MFR BLD: 10 %

## 2018-12-04 PROCEDURE — 99214 OFFICE O/P EST MOD 30 MIN: CPT | Performed by: INTERNAL MEDICINE

## 2018-12-04 PROCEDURE — 83036 HEMOGLOBIN GLYCOSYLATED A1C: CPT | Performed by: INTERNAL MEDICINE

## 2018-12-04 ASSESSMENT — PATIENT HEALTH QUESTIONNAIRE - PHQ9
1. LITTLE INTEREST OR PLEASURE IN DOING THINGS: 0
SUM OF ALL RESPONSES TO PHQ QUESTIONS 1-9: 0
SUM OF ALL RESPONSES TO PHQ9 QUESTIONS 1 & 2: 0
SUM OF ALL RESPONSES TO PHQ QUESTIONS 1-9: 0
2. FEELING DOWN, DEPRESSED OR HOPELESS: 0

## 2018-12-04 NOTE — PATIENT INSTRUCTIONS
-Fast 8-10 hours for labs  -Continue same medications  -Start Jardiance 10mg once daily  -Limit carbohydrates to 45 grams with meals and 15 grams with snacks  -Low sodium diet  -Low fat, low cholesterol diet  -Regular aerobic exercise  -Have a Shingrix vaccine done at your pharmacy

## 2018-12-08 DIAGNOSIS — E11.42 TYPE 2 DIABETES MELLITUS WITH PERIPHERAL NEUROPATHY (HCC): ICD-10-CM

## 2018-12-11 RX ORDER — GLIMEPIRIDE 4 MG/1
TABLET ORAL
Qty: 180 TABLET | Refills: 1 | Status: ON HOLD | OUTPATIENT
Start: 2018-12-11 | End: 2019-05-07 | Stop reason: HOSPADM

## 2018-12-11 RX ORDER — LISINOPRIL 30 MG/1
TABLET ORAL
Qty: 90 TABLET | Refills: 1 | Status: ON HOLD | OUTPATIENT
Start: 2018-12-11 | End: 2019-05-07 | Stop reason: HOSPADM

## 2018-12-11 ASSESSMENT — ENCOUNTER SYMPTOMS
COUGH: 0
CHEST TIGHTNESS: 0
SHORTNESS OF BREATH: 0
WHEEZING: 0
VOMITING: 0
DIARRHEA: 0
NAUSEA: 0
BLOOD IN STOOL: 0
ABDOMINAL PAIN: 0
SORE THROAT: 0
SINUS PRESSURE: 0
CONSTIPATION: 0
RHINORRHEA: 0

## 2019-01-07 DIAGNOSIS — E11.42 TYPE 2 DIABETES MELLITUS WITH PERIPHERAL NEUROPATHY (HCC): ICD-10-CM

## 2019-03-12 ENCOUNTER — OFFICE VISIT (OUTPATIENT)
Dept: INTERNAL MEDICINE CLINIC | Age: 64
End: 2019-03-12
Payer: COMMERCIAL

## 2019-03-12 VITALS
OXYGEN SATURATION: 99 % | HEART RATE: 82 BPM | TEMPERATURE: 97.7 F | HEIGHT: 61 IN | SYSTOLIC BLOOD PRESSURE: 100 MMHG | DIASTOLIC BLOOD PRESSURE: 70 MMHG | BODY MASS INDEX: 24.96 KG/M2 | WEIGHT: 132.2 LBS

## 2019-03-12 DIAGNOSIS — E78.2 MIXED HYPERLIPIDEMIA: ICD-10-CM

## 2019-03-12 DIAGNOSIS — I10 ESSENTIAL HYPERTENSION: ICD-10-CM

## 2019-03-12 LAB — HBA1C MFR BLD: 8.8 %

## 2019-03-12 PROCEDURE — 99214 OFFICE O/P EST MOD 30 MIN: CPT | Performed by: INTERNAL MEDICINE

## 2019-03-12 PROCEDURE — 83036 HEMOGLOBIN GLYCOSYLATED A1C: CPT | Performed by: INTERNAL MEDICINE

## 2019-03-12 ASSESSMENT — ENCOUNTER SYMPTOMS
WHEEZING: 0
BLOOD IN STOOL: 0
RHINORRHEA: 0
CONSTIPATION: 0
SINUS PRESSURE: 0
DIARRHEA: 0
VOMITING: 0
SORE THROAT: 0
CHEST TIGHTNESS: 0
NAUSEA: 0
ABDOMINAL PAIN: 0
COUGH: 0
SHORTNESS OF BREATH: 0

## 2019-03-12 ASSESSMENT — PATIENT HEALTH QUESTIONNAIRE - PHQ9
2. FEELING DOWN, DEPRESSED OR HOPELESS: 0
SUM OF ALL RESPONSES TO PHQ9 QUESTIONS 1 & 2: 0
SUM OF ALL RESPONSES TO PHQ QUESTIONS 1-9: 0
1. LITTLE INTEREST OR PLEASURE IN DOING THINGS: 0
SUM OF ALL RESPONSES TO PHQ QUESTIONS 1-9: 0

## 2019-03-18 ENCOUNTER — CLINICAL DOCUMENTATION (OUTPATIENT)
Dept: INTERNAL MEDICINE CLINIC | Age: 64
End: 2019-03-18

## 2019-05-03 ENCOUNTER — APPOINTMENT (OUTPATIENT)
Dept: GENERAL RADIOLOGY | Age: 64
DRG: 637 | End: 2019-05-03
Payer: COMMERCIAL

## 2019-05-03 ENCOUNTER — HOSPITAL ENCOUNTER (INPATIENT)
Age: 64
LOS: 4 days | Discharge: HOME | DRG: 637 | End: 2019-05-07
Attending: EMERGENCY MEDICINE
Payer: COMMERCIAL

## 2019-05-03 ENCOUNTER — APPOINTMENT (OUTPATIENT)
Dept: CT IMAGING | Age: 64
DRG: 637 | End: 2019-05-03
Payer: COMMERCIAL

## 2019-05-03 DIAGNOSIS — E16.2 HYPOGLYCEMIA: Primary | ICD-10-CM

## 2019-05-03 LAB
A/G RATIO: 1.1 (ref 1.1–2.2)
ALBUMIN SERPL-MCNC: 4.4 G/DL (ref 3.4–5)
ALP BLD-CCNC: 63 U/L (ref 40–129)
ALT SERPL-CCNC: 21 U/L (ref 10–40)
ANION GAP SERPL CALCULATED.3IONS-SCNC: 11 MMOL/L (ref 3–16)
ANION GAP SERPL CALCULATED.3IONS-SCNC: 12 MMOL/L (ref 3–16)
APTT: 30.9 SEC (ref 26–36)
AST SERPL-CCNC: 28 U/L (ref 15–37)
BASOPHILS ABSOLUTE: 0 K/UL (ref 0–0.2)
BASOPHILS RELATIVE PERCENT: 0.4 %
BILIRUB SERPL-MCNC: <0.2 MG/DL (ref 0–1)
BUN BLDV-MCNC: 12 MG/DL (ref 7–20)
BUN BLDV-MCNC: 17 MG/DL (ref 7–20)
CALCIUM SERPL-MCNC: 10 MG/DL (ref 8.3–10.6)
CALCIUM SERPL-MCNC: 9.6 MG/DL (ref 8.3–10.6)
CHLORIDE BLD-SCNC: 101 MMOL/L (ref 99–110)
CHLORIDE BLD-SCNC: 103 MMOL/L (ref 99–110)
CO2: 25 MMOL/L (ref 21–32)
CO2: 26 MMOL/L (ref 21–32)
CORTISOL TOTAL: 6.5 UG/DL
CREAT SERPL-MCNC: 1.1 MG/DL (ref 0.6–1.2)
CREAT SERPL-MCNC: 1.2 MG/DL (ref 0.6–1.2)
EKG ATRIAL RATE: 85 BPM
EKG DIAGNOSIS: NORMAL
EKG P AXIS: 67 DEGREES
EKG P-R INTERVAL: 154 MS
EKG Q-T INTERVAL: 396 MS
EKG QRS DURATION: 74 MS
EKG QTC CALCULATION (BAZETT): 471 MS
EKG R AXIS: 38 DEGREES
EKG T AXIS: 44 DEGREES
EKG VENTRICULAR RATE: 85 BPM
EOSINOPHILS ABSOLUTE: 0.1 K/UL (ref 0–0.6)
EOSINOPHILS RELATIVE PERCENT: 1.8 %
GFR AFRICAN AMERICAN: 55
GFR AFRICAN AMERICAN: >60
GFR NON-AFRICAN AMERICAN: 46
GFR NON-AFRICAN AMERICAN: 50
GLOBULIN: 3.9 G/DL
GLUCOSE BLD-MCNC: 107 MG/DL (ref 70–99)
GLUCOSE BLD-MCNC: 120 MG/DL (ref 70–99)
GLUCOSE BLD-MCNC: 131 MG/DL (ref 70–99)
GLUCOSE BLD-MCNC: 136 MG/DL (ref 70–99)
GLUCOSE BLD-MCNC: 186 MG/DL (ref 70–99)
GLUCOSE BLD-MCNC: 240 MG/DL (ref 70–99)
GLUCOSE BLD-MCNC: 265 MG/DL (ref 70–99)
GLUCOSE BLD-MCNC: 287 MG/DL (ref 70–99)
GLUCOSE BLD-MCNC: 318 MG/DL (ref 70–99)
GLUCOSE BLD-MCNC: 32 MG/DL (ref 70–99)
GLUCOSE BLD-MCNC: 328 MG/DL (ref 70–99)
GLUCOSE BLD-MCNC: 57 MG/DL (ref 70–99)
GLUCOSE BLD-MCNC: 60 MG/DL (ref 70–99)
GLUCOSE BLD-MCNC: 60 MG/DL (ref 70–99)
GLUCOSE BLD-MCNC: 83 MG/DL (ref 70–99)
HCT VFR BLD CALC: 35.1 % (ref 36–48)
HEMOGLOBIN: 11.6 G/DL (ref 12–16)
INR BLD: 0.93 (ref 0.86–1.14)
LYMPHOCYTES ABSOLUTE: 1.5 K/UL (ref 1–5.1)
LYMPHOCYTES RELATIVE PERCENT: 20.5 %
MCH RBC QN AUTO: 27.2 PG (ref 26–34)
MCHC RBC AUTO-ENTMCNC: 33 G/DL (ref 31–36)
MCV RBC AUTO: 82.5 FL (ref 80–100)
MONOCYTES ABSOLUTE: 0.5 K/UL (ref 0–1.3)
MONOCYTES RELATIVE PERCENT: 6.1 %
NEUTROPHILS ABSOLUTE: 5.3 K/UL (ref 1.7–7.7)
NEUTROPHILS RELATIVE PERCENT: 71.2 %
PDW BLD-RTO: 13.8 % (ref 12.4–15.4)
PERFORMED ON: ABNORMAL
PERFORMED ON: NORMAL
PLATELET # BLD: 309 K/UL (ref 135–450)
PMV BLD AUTO: 6.8 FL (ref 5–10.5)
POTASSIUM REFLEX MAGNESIUM: 5.4 MMOL/L (ref 3.5–5.1)
POTASSIUM SERPL-SCNC: 4.9 MMOL/L (ref 3.5–5.1)
PROTHROMBIN TIME: 10.6 SEC (ref 9.8–13)
RBC # BLD: 4.26 M/UL (ref 4–5.2)
SODIUM BLD-SCNC: 138 MMOL/L (ref 136–145)
SODIUM BLD-SCNC: 140 MMOL/L (ref 136–145)
TOTAL PROTEIN: 8.3 G/DL (ref 6.4–8.2)
TROPONIN: <0.01 NG/ML
WBC # BLD: 7.4 K/UL (ref 4–11)

## 2019-05-03 PROCEDURE — 2580000003 HC RX 258: Performed by: EMERGENCY MEDICINE

## 2019-05-03 PROCEDURE — 82533 TOTAL CORTISOL: CPT

## 2019-05-03 PROCEDURE — 6370000000 HC RX 637 (ALT 250 FOR IP): Performed by: STUDENT IN AN ORGANIZED HEALTH CARE EDUCATION/TRAINING PROGRAM

## 2019-05-03 PROCEDURE — 2580000003 HC RX 258: Performed by: STUDENT IN AN ORGANIZED HEALTH CARE EDUCATION/TRAINING PROGRAM

## 2019-05-03 PROCEDURE — 93010 ELECTROCARDIOGRAM REPORT: CPT | Performed by: INTERNAL MEDICINE

## 2019-05-03 PROCEDURE — 96374 THER/PROPH/DIAG INJ IV PUSH: CPT

## 2019-05-03 PROCEDURE — 99285 EMERGENCY DEPT VISIT HI MDM: CPT

## 2019-05-03 PROCEDURE — 2000000000 HC ICU R&B

## 2019-05-03 PROCEDURE — 1200000000 HC SEMI PRIVATE

## 2019-05-03 PROCEDURE — 6360000002 HC RX W HCPCS: Performed by: STUDENT IN AN ORGANIZED HEALTH CARE EDUCATION/TRAINING PROGRAM

## 2019-05-03 PROCEDURE — 99223 1ST HOSP IP/OBS HIGH 75: CPT | Performed by: INTERNAL MEDICINE

## 2019-05-03 PROCEDURE — 85025 COMPLETE CBC W/AUTO DIFF WBC: CPT

## 2019-05-03 PROCEDURE — 71045 X-RAY EXAM CHEST 1 VIEW: CPT

## 2019-05-03 PROCEDURE — 84484 ASSAY OF TROPONIN QUANT: CPT

## 2019-05-03 PROCEDURE — 85610 PROTHROMBIN TIME: CPT

## 2019-05-03 PROCEDURE — 85730 THROMBOPLASTIN TIME PARTIAL: CPT

## 2019-05-03 PROCEDURE — 93005 ELECTROCARDIOGRAM TRACING: CPT | Performed by: EMERGENCY MEDICINE

## 2019-05-03 PROCEDURE — 83036 HEMOGLOBIN GLYCOSYLATED A1C: CPT

## 2019-05-03 PROCEDURE — 36415 COLL VENOUS BLD VENIPUNCTURE: CPT

## 2019-05-03 PROCEDURE — 70450 CT HEAD/BRAIN W/O DYE: CPT

## 2019-05-03 PROCEDURE — 80053 COMPREHEN METABOLIC PANEL: CPT

## 2019-05-03 PROCEDURE — 84443 ASSAY THYROID STIM HORMONE: CPT

## 2019-05-03 RX ORDER — DEXTROSE MONOHYDRATE 50 MG/ML
INJECTION, SOLUTION INTRAVENOUS CONTINUOUS
Status: DISCONTINUED | OUTPATIENT
Start: 2019-05-03 | End: 2019-05-03

## 2019-05-03 RX ORDER — ROSUVASTATIN CALCIUM 20 MG/1
20 TABLET, COATED ORAL NIGHTLY
Status: DISCONTINUED | OUTPATIENT
Start: 2019-05-03 | End: 2019-05-08 | Stop reason: HOSPADM

## 2019-05-03 RX ORDER — SODIUM CHLORIDE 0.9 % (FLUSH) 0.9 %
10 SYRINGE (ML) INJECTION PRN
Status: DISCONTINUED | OUTPATIENT
Start: 2019-05-03 | End: 2019-05-08 | Stop reason: HOSPADM

## 2019-05-03 RX ORDER — NICOTINE POLACRILEX 4 MG
15 LOZENGE BUCCAL PRN
Status: DISCONTINUED | OUTPATIENT
Start: 2019-05-03 | End: 2019-05-03 | Stop reason: SDUPTHER

## 2019-05-03 RX ORDER — DEXTROSE MONOHYDRATE 50 MG/ML
100 INJECTION, SOLUTION INTRAVENOUS PRN
Status: DISCONTINUED | OUTPATIENT
Start: 2019-05-03 | End: 2019-05-03 | Stop reason: SDUPTHER

## 2019-05-03 RX ORDER — DEXTROSE MONOHYDRATE 25 G/50ML
25 INJECTION, SOLUTION INTRAVENOUS ONCE
Status: COMPLETED | OUTPATIENT
Start: 2019-05-03 | End: 2019-05-03

## 2019-05-03 RX ORDER — DEXTROSE MONOHYDRATE 25 G/50ML
12.5 INJECTION, SOLUTION INTRAVENOUS PRN
Status: DISCONTINUED | OUTPATIENT
Start: 2019-05-03 | End: 2019-05-03 | Stop reason: SDUPTHER

## 2019-05-03 RX ORDER — ONDANSETRON 2 MG/ML
4 INJECTION INTRAMUSCULAR; INTRAVENOUS EVERY 6 HOURS PRN
Status: DISCONTINUED | OUTPATIENT
Start: 2019-05-03 | End: 2019-05-08 | Stop reason: HOSPADM

## 2019-05-03 RX ORDER — OCTREOTIDE ACETATE 50 UG/ML
50 INJECTION, SOLUTION INTRAVENOUS; SUBCUTANEOUS EVERY 6 HOURS
Status: DISCONTINUED | OUTPATIENT
Start: 2019-05-03 | End: 2019-05-03

## 2019-05-03 RX ORDER — NICOTINE POLACRILEX 4 MG
15 LOZENGE BUCCAL PRN
Status: DISCONTINUED | OUTPATIENT
Start: 2019-05-03 | End: 2019-05-08 | Stop reason: HOSPADM

## 2019-05-03 RX ORDER — ASPIRIN 81 MG/1
81 TABLET, CHEWABLE ORAL DAILY
Status: DISCONTINUED | OUTPATIENT
Start: 2019-05-03 | End: 2019-05-08 | Stop reason: HOSPADM

## 2019-05-03 RX ORDER — SODIUM CHLORIDE 0.9 % (FLUSH) 0.9 %
10 SYRINGE (ML) INJECTION EVERY 12 HOURS SCHEDULED
Status: DISCONTINUED | OUTPATIENT
Start: 2019-05-03 | End: 2019-05-08 | Stop reason: HOSPADM

## 2019-05-03 RX ORDER — DEXTROSE MONOHYDRATE 25 G/50ML
12.5 INJECTION, SOLUTION INTRAVENOUS PRN
Status: DISCONTINUED | OUTPATIENT
Start: 2019-05-03 | End: 2019-05-08 | Stop reason: HOSPADM

## 2019-05-03 RX ORDER — DEXTROSE MONOHYDRATE 50 MG/ML
100 INJECTION, SOLUTION INTRAVENOUS PRN
Status: DISCONTINUED | OUTPATIENT
Start: 2019-05-03 | End: 2019-05-08 | Stop reason: HOSPADM

## 2019-05-03 RX ORDER — DEXTROSE MONOHYDRATE 25 G/50ML
INJECTION, SOLUTION INTRAVENOUS
Status: DISCONTINUED
Start: 2019-05-03 | End: 2019-05-03 | Stop reason: SDUPTHER

## 2019-05-03 RX ADMIN — ENOXAPARIN SODIUM 40 MG: 40 INJECTION SUBCUTANEOUS at 12:51

## 2019-05-03 RX ADMIN — DEXTROSE 15 G: 15 GEL ORAL at 12:41

## 2019-05-03 RX ADMIN — ASPIRIN 81 MG 81 MG: 81 TABLET ORAL at 12:51

## 2019-05-03 RX ADMIN — DEXTROSE MONOHYDRATE 25 G: 25 INJECTION, SOLUTION INTRAVENOUS at 07:58

## 2019-05-03 RX ADMIN — DEXTROSE MONOHYDRATE 12.5 G: 25 INJECTION, SOLUTION INTRAVENOUS at 15:31

## 2019-05-03 RX ADMIN — DEXTROSE MONOHYDRATE: 50 INJECTION, SOLUTION INTRAVENOUS at 12:50

## 2019-05-03 RX ADMIN — DEXTROSE MONOHYDRATE: 50 INJECTION, SOLUTION INTRAVENOUS at 13:49

## 2019-05-03 RX ADMIN — Medication 10 ML: at 20:24

## 2019-05-03 RX ADMIN — DEXTROSE MONOHYDRATE 25 G: 25 INJECTION, SOLUTION INTRAVENOUS at 13:23

## 2019-05-03 RX ADMIN — ROSUVASTATIN CALCIUM 20 MG: 20 TABLET, COATED ORAL at 20:24

## 2019-05-03 RX ADMIN — OCTREOTIDE ACETATE 50 MCG: 50 INJECTION, SOLUTION INTRAVENOUS; SUBCUTANEOUS at 15:18

## 2019-05-03 RX ADMIN — LISINOPRIL 30 MG: 20 TABLET ORAL at 12:51

## 2019-05-03 ASSESSMENT — PAIN SCALES - GENERAL
PAINLEVEL_OUTOF10: 0

## 2019-05-03 ASSESSMENT — ENCOUNTER SYMPTOMS
EYE ITCHING: 0
NAUSEA: 0
ABDOMINAL PAIN: 0
SHORTNESS OF BREATH: 0
COUGH: 0
CHEST TIGHTNESS: 0
EYE REDNESS: 0
SORE THROAT: 0
VOMITING: 0
BACK PAIN: 0

## 2019-05-03 NOTE — ED NOTES
Patient given peanut butter crackers and orange juice at bedside.      Linda Fritz RN  05/03/19 1352

## 2019-05-03 NOTE — ED NOTES
8476: Per Dr. Мария Murphy, patient not to be transferred per 911.      Santana Taylor RN  05/03/19 8314

## 2019-05-03 NOTE — ED NOTES
Poct glucose 136. Nurse and Dr. Ly.       Angie Alba, [facility]  05/03/19 8236

## 2019-05-03 NOTE — ED NOTES
ETA of transportation was 0845; transportation has not arrived, called transfer center who is contacting transport.      Belkis Appiah RN  05/03/19 8653

## 2019-05-03 NOTE — ED NOTES
Report has been given to [facility] personnel. Belongings bagged and with patient.  at bedside.      Yesenia Rodrigues RN  05/03/19 5933

## 2019-05-03 NOTE — PROGRESS NOTES
Pt is admitted to unit from ED. VSS. Bed in lowest position, call light and belongings within reach. Patient education folder given and reviewed. Safety protocols and unit activities (VS, meds, rounding, etc.) explained to patient/family. White board updated. No further questions or needs stated at this time. Instructed to call with any needs.   Electronically signed by Johan Patterson RN on 5/3/2019 at 10:42 AM

## 2019-05-03 NOTE — H&P
Internal Medicine  PGY 2  History & Physical      CC AMS, slurred speech, right sided weakness    History Obtained From:  patient    HISTORY OF PRESENT ILLNESS:  This is a pleasant 66year old female with past medical history of Type II DM, HTN presents with confusion, right sided numbness, and slurred speech. Started this am per  after he woke her up. She was confused about time but aware of her place and surroundings. On speech she was slurred along with what he reports as dragging her right arm and said \" thought she is having a stroke. \" Blood glucose was checked by  and found to be in low 30s, he gave her two glasses of orange juice immediately which improved but did not resolve her symptoms. EMS was called and patient was brought to [facility] where most of her symptoms subsided. Her blood glucose was found to be low and she was given amp of D 50. She takes 4 diabetes medication along with metformin, sulfonureas and Jardiance.      Past Medical History:        Diagnosis Date    Carpal tunnel syndrome     bilateral    Hyperlipidemia     Hypertension     Iron deficiency anemia 2/14/2012    Type 2 diabetes mellitus with peripheral neuropathy ([facility] [address] 8/15/2016    Type II or unspecified type diabetes mellitus without mention of complication, not stated as uncontrolled    ·     Past Surgical History:        Procedure Laterality Date    CARPAL TUNNEL RELEASE Bilateral 2/2006    COLONOSCOPY  12/07/2016    Dayan FAN   [address]  2006   [facility] EYE SURGERY  2007    cataract - left - approx date    TUBAL LIGATION     ·     Medications Prior to Admission:    · Medications Prior to Admission: empagliflozin (JARDIANCE) 25 MG tablet, Take 25 mg by mouth daily  · rosuvastatin (CRESTOR) 20 MG tablet, TAKE 1 TABLET NIGHTLY  · metFORMIN (GLUCOPHAGE) 1000 MG tablet, TAKE 1 TABLET TWICE A DAY WITH MEALS (Patient taking differently: TAKE 2 TABLET ONCE A DAY)  · ONE TOUCH ULTRA TEST strip, USE 1 STRIP TO breath sounds normal. No respiratory distress. Abdominal: Soft. Bowel sounds are normal. She exhibits no distension. Musculoskeletal: Normal range of motion. She exhibits no edema. Neurological: She is alert and oriented to person, place, and time. No cranial nerve deficit or sensory deficit. Skin: Skin is warm and dry. Psychiatric: She has a normal mood and affect. DATA:    Labs:  CBC:   Recent Labs     05/03/19 0746   WBC 7.4   HGB 11.6*   HCT 35.1*          BMP:   Recent Labs     05/03/19 0746      K 5.4*      CO2 25   BUN 17   CREATININE 1.2   GLUCOSE 57*     LFT's:   Recent Labs     05/03/19 0746   AST 28   ALT 21   BILITOT <0.2   ALKPHOS 63     Troponin:   Recent Labs     05/03/19 0746   TROPONINI <0.01     BNP: No results for input(s): BNP in the last 72 hours. ABGs: No results for input(s): PHART, ZIU7ELB, PO2ART in the last 72 hours. INR:   Recent Labs     05/03/19 0746   INR 0.93       U/A:No results for input(s): NITRITE, COLORU, PHUR, LABCAST, WBCUA, RBCUA, MUCUS, TRICHOMONAS, YEAST, BACTERIA, CLARITYU, SPECGRAV, LEUKOCYTESUR, UROBILINOGEN, BILIRUBINUR, BLOODU, GLUCOSEU, AMORPHOUS in the last 72 hours. Invalid input(s): KETONESU    XR CHEST 1 VW   Final Result   1. No acute cardiopulmonary abnormalities. CT Head WO Contrast   Final Result      No acute hemorrhage              ASSESSMENT AND PLAN:    This is a 66year old Type II DM female presents with hypoglycemia and right sided weakness/numbness along with slurred speech    Transient Ischemic Attack-Reported of stroke like symptoms, right sided weakness along with slurred speech, confusion. Resolved after treatment of hypoglycemia. Lasted less than 20 minutes.    -Aspirin, statin per home use  -MRI brain ordered  -Holding home oral diabetic regimen  -D5 at 75cc/hr  -POCT glucose checks hourly  -neuro checks Q4 hours    Hypoglycemia-Likely secondary to poor oral intake as well as Oral hypoglycemic

## 2019-05-03 NOTE — ED NOTES
Call back from [address] [place] [address] Ave, [address] to be present shortly.      Yesenia Rodrigues RN  05/03/19 7619

## 2019-05-03 NOTE — ED NOTES
Report given to Charge RN. Patient has been tranferred to [facility] per [address] IV intact. Pt's status unchanged.      Wolf Iqbal RN  05/03/19 7227

## 2019-05-03 NOTE — PROGRESS NOTES
ICU Consult/TRANSFER ACCEPTANCE NOTE    S: Briefly this is a 67 yo F w PMH NIDDM, HTN p/w confusion, right sided shaking and weakness along with slurred speech. Started this am per  after he woke her up. She was confused about time but aware of her place and surroundings. Speech was slurred along with what he reports as dragging her right arm and said \" thought she is having a stroke. \" Pt states she recalls everything and was too weak to get out of bed.  recalled a similar episode of confusion a few years ago due to hypoglycemia and thus checked pt's BG; it was found to be in low 30s, he gave her two glasses of orange juice and her symptoms immediately improved but did not completely resolve. Pt was taken to [facility] initially where she had a [address] woc which was neg for ICH. Her BG sugar dropped again to 50s. She was transferred to [facility] for further management. Pt has been taking 4 PO diabetic meds Jardiance, metformin, januvia and glimepiride. States there have been no changes in her medications or dosing. Reports seeing her PCP ~5 weeks ago. While on floor pt required close monitoring of BG as it would frequently drop below 60 shortly after administering D50. She was started on D5. BP noted to soft . Pt transferred to ICU for closer monitoring of glucose and neuro status. Currently pt feeling back to baseline. AOx3, no weakness. States she has had diarrhea for past 4 days. Denies sick contacts, recent abx use. Denies CP/N/V/SOB.       Past Medical History:      Diagnosis Date    Carpal tunnel syndrome     bilateral    Hyperlipidemia     Hypertension     Iron deficiency anemia 2/14/2012    Type 2 diabetes mellitus with peripheral neuropathy ([facility] [address] 8/15/2016    Type II or unspecified type diabetes mellitus without mention of complication, not stated as uncontrolled       Past Surgical History:        Procedure Laterality Date    CARPAL TUNNEL RELEASE Bilateral 2/2006 plan.  Briefly, this is a 66 y.o. female with diabetes who had symptoms of a stroke on awakening this morning. Patient was found to be hypoglycemic at that time and was given juice but her symptoms didn't improve. She was admitted to the floor and required frequent D50 injections to maintain an adequate glucose and then would drop in between despite a D5 drip. She was transferred to the ICU for more aggressive glucose monitoring and management. She denies any changes in her antihyperglycemic regimen but does endorse decreased appetite recently and some mild weight loss. Since transfer, her sugars have been running higher. She received one dose of octreotide. I'm going to cancel the additional doses at this point as her D5 is only going at 50ml/hr. If her blood glucose stays elevated then we can titrate back the D5 infusion. Etiology of her hypoglycemia is probably polypharmacy and recent reduction in PO intake. Intentional overdose seems unlikely.     Yarelis Becker MD

## 2019-05-03 NOTE — PROGRESS NOTES
Transferred from [phone] in stable condition. Awake, alert. D5W infusing at 75 ml/hr. Denies C/O at present. Family at bedside. Oriented to room. Sitting in chair at bedside. Call light in reach.

## 2019-05-03 NOTE — ED NOTES
RT just received call from transfer center stating they have contacted [address] [address], stated we will have a room on 5 tower, first care will be here at [address] [place] [address], and they had a call out to the hospitalist.     Little Campbell, RAMIREZ  05/03/19 9103

## 2019-05-03 NOTE — ED NOTES
POCT glucose 32. Nurse and  were both informed.       Maite Obregon, [facility]  05/03/19 [address] [address],[address], [facility]  05/03/19 6120

## 2019-05-03 NOTE — PROGRESS NOTES
4 Eyes Admission Assessment     I agree as the admission nurse that 2 RN's have performed a thorough Head to Toe Skin Assessment on the patient. ALL assessment sites listed below have been assessed on admission. *   Areas assessed by both nurses:   [x]   Head, Face, and Ears   [x]   Shoulders, Back, and Chest  [x]   Arms, Elbows, and Hands   [x]   Coccyx, Sacrum, and Ischum  [x]   Legs, Feet, and Heels        Does the Patient have Skin Breakdown?   No         George Prevention initiated:  No   Wound Care Orders initiated:  No      [facility] nurse consulted for Pressure Injury (Stage 3,4, Unstageable, DTI, NWPT, and Complex wounds):  No      Nurse 1 eSignature: Electronically signed by Luly Benitez RN on 5/3/19 at 11:07 AM    **SHARE this note so that the co-signing nurse is able to place an eSignature**    Nurse 2 eSignature: Electronically signed by Kat Esquivel RN on 5/3/19 at 5:06 PM

## 2019-05-03 NOTE — ED NOTES
Educated patient and patient's family re: risk factors for stroke. Patient and family given information sheet regarding stroke.      Ken Cleaning RN  05/03/19 6716

## 2019-05-03 NOTE — ED PROVIDER NOTES
CHIEF COMPLAINT  Fatigue and Extremity Weakness (Patient c/o R arm weakness beginning approx 0630 today.)      HISTORY OF PRESENT ILLNESS  Leisa Donnelly is a 66 y.o. female, who presents to the ED with onset this morning around 6 AM of confusion with slurred speech and generalized weakness greater on the right. Last approximately 30 minutes patient state stated to her  that she believed that her sugar was low he gave her orange juice to drink with improvement of her symptoms patient is currently asymptomatic no weakness no numbness no confusion no slurred speech no visual changes no chest pain no shortness of breath no palpitations. The patient noted that her appetite has been decreased in the recent past she ate less than usual last night. She was asymptomatic last night before she went to bed which was approximately at midnight. Review of Systems   Constitutional: Positive for appetite change (Patient has had a decreased appetite in the recent past) and fatigue. Negative for fever. HENT: Negative for congestion and sore throat. Eyes: Negative for redness and itching. Respiratory: Negative for cough, chest tightness and shortness of breath. Cardiovascular: Negative for chest pain and palpitations. Gastrointestinal: Negative for abdominal pain, nausea and vomiting. Endocrine: Negative for polydipsia and polyuria. Genitourinary: Negative for flank pain and urgency. Musculoskeletal: Negative for back pain and neck pain. Neurological: Positive for speech difficulty, weakness and light-headedness. Negative for tremors, facial asymmetry, numbness and headaches. Psychiatric/Behavioral: Positive for confusion. Negative for behavioral problems. The patient is not nervous/anxious. I have reviewed the following from the nursing documentation.     Past Medical History:   Diagnosis Date    Carpal tunnel syndrome     bilateral    Hyperlipidemia     Hypertension     Iron deficiency anemia 2/14/2012    Type 2 diabetes mellitus with peripheral neuropathy ([facility] [address] 8/15/2016    Type II or unspecified type diabetes mellitus without mention of complication, not stated as uncontrolled      Past Surgical History:   Procedure Laterality Date    CARPAL TUNNEL RELEASE Bilateral 2/2006    COLONOSCOPY  12/07/2016    Dayan FAN    ENDOMETRIAL ABLATION  2006    EYE SURGERY  2007    cataract - left - approx date    TUBAL LIGATION       Family History   Problem Relation Age of Onset    Asthma Mother     Heart Disease Mother     Heart Disease Father     Diabetes Father     High Blood Pressure Father     Asthma Sister     Diabetes Sister     Kidney Disease Sister     Seizures Other      Social History     Socioeconomic History    Marital status:      Spouse name: Not on file    Number of children: Not on file    Years of education: Not on file    Highest education level: Not on file   Occupational History    Not on file   Social Needs    Financial resource strain: Not on file    Food insecurity:     Worry: Not on file     Inability: Not on file    Transportation needs:     Medical: Not on file     Non-medical: Not on file   Tobacco Use    Smoking status: Never Smoker    Smokeless tobacco: Never Used   Substance and Sexual Activity    Alcohol use:  Yes     Alcohol/week: 3.0 oz     Types: 5 Glasses of wine per week    Drug use: No    Sexual activity: Not on file   Lifestyle    Physical activity:     Days per week: Not on file     Minutes per session: Not on file    Stress: Not on file   Relationships    Social connections:     Talks on phone: Not on file     Gets together: Not on file     Attends Anabaptism service: Not on file     Active member of club or organization: Not on file     Attends meetings of clubs or organizations: Not on file     Relationship status: Not on file    Intimate partner violence:     Fear of current or ex partner: Not on file     Emotionally abused: Not on file     Physically abused: Not on file     Forced sexual activity: Not on file   Other Topics Concern    Not on file   Social History Narrative    Not on file     Current Facility-Administered Medications   Medication Dose Route Frequency Provider Last Rate Last Dose    dextrose 50 % solution              No Known Allergies       PHYSICAL EXAM  /79   Pulse 90   Temp 98 °F (36.7 °C) (Oral)   Resp 16   Ht 5' 1\" (1.549 m)   Wt 58.2 kg (128 lb 6.4 oz)   SpO2 99%   BMI 24.26 kg/m²   Physical Exam   GENERAL APPEARANCE: Awake and alert. Cooperative. In no acute distress. EYES: PERRL. Corneas clear. Sclera non icteric. No conjunctival injection  ENT: Oropharynx clear. Airway patent. No stridor. No asymmetry. NECK: Supple  LUNGS: Clear. Equal breath sounds bilaterally. CARDIOVASCULAR: RRR. No murmurs rubs or gallops. ABDOMEN: Soft non tender. No guarding or rebound. EXTREMITIES:  Moves all extremities equally. SKIN: Warm and dry. NEURO: Alert and oriented x3. Strength 5/5 throughout.   NIH stroke scale is 0        LABORATORY STUDIES:   Labs Reviewed   CBC WITH AUTO DIFFERENTIAL - Abnormal; Notable for the following components:       Result Value    Hemoglobin 11.6 (*)     Hematocrit 35.1 (*)     All other components within normal limits    Narrative:     Performed at:  [facility]) [facility]  [facility]   Phone [phone] METABOLIC PANEL W/ REFLEX TO MG FOR LOW K - Abnormal; Notable for the following components:    Potassium reflex Magnesium 5.4 (*)     Glucose 57 (*)     GFR Non- 46 (*)     GFR  55 (*)     Total Protein 8.3 (*)     All other components within normal limits    Narrative:     Performed at:  [facility]) [facility]  [facility]   Phone [phone]   POCT GLUCOSE - Abnormal; Notable for the following Pulse 90   Temp 98 °F (36.7 °C) (Oral)   Resp 16   Ht 5' 1\" (1.549 m)   Wt 58.2 kg (128 lb 6.4 oz)   SpO2 99%   BMI 24.26 kg/m²     DISPOSITION  Mary Alice Carr was transferred for admission to the [facility]. in stable condition.                    Kiki Mayen MD  05/03/19 100

## 2019-05-04 LAB
ANION GAP SERPL CALCULATED.3IONS-SCNC: 10 MMOL/L (ref 3–16)
BUN BLDV-MCNC: 13 MG/DL (ref 7–20)
CALCIUM SERPL-MCNC: 9.6 MG/DL (ref 8.3–10.6)
CHLORIDE BLD-SCNC: 103 MMOL/L (ref 99–110)
CO2: 27 MMOL/L (ref 21–32)
CREAT SERPL-MCNC: 1.2 MG/DL (ref 0.6–1.2)
ESTIMATED AVERAGE GLUCOSE: 191.5 MG/DL
GFR AFRICAN AMERICAN: 55
GFR NON-AFRICAN AMERICAN: 46
GLUCOSE BLD-MCNC: 117 MG/DL (ref 70–99)
GLUCOSE BLD-MCNC: 118 MG/DL (ref 70–99)
GLUCOSE BLD-MCNC: 123 MG/DL (ref 70–99)
GLUCOSE BLD-MCNC: 127 MG/DL (ref 70–99)
GLUCOSE BLD-MCNC: 152 MG/DL (ref 70–99)
GLUCOSE BLD-MCNC: 165 MG/DL (ref 70–99)
GLUCOSE BLD-MCNC: 169 MG/DL (ref 70–99)
GLUCOSE BLD-MCNC: 235 MG/DL (ref 70–99)
GLUCOSE BLD-MCNC: 301 MG/DL (ref 70–99)
GLUCOSE BLD-MCNC: 412 MG/DL (ref 70–99)
GLUCOSE BLD-MCNC: 440 MG/DL (ref 70–99)
GLUCOSE BLD-MCNC: 95 MG/DL (ref 70–99)
HBA1C MFR BLD: 8.3 %
PERFORMED ON: ABNORMAL
PERFORMED ON: NORMAL
POTASSIUM REFLEX MAGNESIUM: 5 MMOL/L (ref 3.5–5.1)
SODIUM BLD-SCNC: 140 MMOL/L (ref 136–145)
TSH REFLEX: 1.2 UIU/ML (ref 0.27–4.2)

## 2019-05-04 PROCEDURE — 93005 ELECTROCARDIOGRAM TRACING: CPT | Performed by: STUDENT IN AN ORGANIZED HEALTH CARE EDUCATION/TRAINING PROGRAM

## 2019-05-04 PROCEDURE — 1200000000 HC SEMI PRIVATE

## 2019-05-04 PROCEDURE — 36415 COLL VENOUS BLD VENIPUNCTURE: CPT

## 2019-05-04 PROCEDURE — 6370000000 HC RX 637 (ALT 250 FOR IP): Performed by: STUDENT IN AN ORGANIZED HEALTH CARE EDUCATION/TRAINING PROGRAM

## 2019-05-04 PROCEDURE — 2580000003 HC RX 258: Performed by: STUDENT IN AN ORGANIZED HEALTH CARE EDUCATION/TRAINING PROGRAM

## 2019-05-04 PROCEDURE — 80048 BASIC METABOLIC PNL TOTAL CA: CPT

## 2019-05-04 PROCEDURE — 99232 SBSQ HOSP IP/OBS MODERATE 35: CPT | Performed by: INTERNAL MEDICINE

## 2019-05-04 PROCEDURE — 6360000002 HC RX W HCPCS: Performed by: STUDENT IN AN ORGANIZED HEALTH CARE EDUCATION/TRAINING PROGRAM

## 2019-05-04 RX ORDER — 0.9 % SODIUM CHLORIDE 0.9 %
500 INTRAVENOUS SOLUTION INTRAVENOUS ONCE
Status: COMPLETED | OUTPATIENT
Start: 2019-05-04 | End: 2019-05-04

## 2019-05-04 RX ORDER — DEXTROSE MONOHYDRATE 50 MG/ML
INJECTION, SOLUTION INTRAVENOUS CONTINUOUS
Status: DISCONTINUED | OUTPATIENT
Start: 2019-05-04 | End: 2019-05-04

## 2019-05-04 RX ADMIN — LINAGLIPTIN 5 MG: 5 TABLET, FILM COATED ORAL at 13:34

## 2019-05-04 RX ADMIN — INSULIN LISPRO 4 UNITS: 100 INJECTION, SOLUTION INTRAVENOUS; SUBCUTANEOUS at 13:34

## 2019-05-04 RX ADMIN — METFORMIN HYDROCHLORIDE 1000 MG: 500 TABLET ORAL at 17:20

## 2019-05-04 RX ADMIN — Medication 10 ML: at 08:29

## 2019-05-04 RX ADMIN — ENOXAPARIN SODIUM 40 MG: 40 INJECTION SUBCUTANEOUS at 08:29

## 2019-05-04 RX ADMIN — SODIUM CHLORIDE 500 ML: 9 INJECTION, SOLUTION INTRAVENOUS at 18:40

## 2019-05-04 RX ADMIN — DEXTROSE MONOHYDRATE: 50 INJECTION, SOLUTION INTRAVENOUS at 01:47

## 2019-05-04 RX ADMIN — ASPIRIN 81 MG 81 MG: 81 TABLET ORAL at 08:29

## 2019-05-04 RX ADMIN — ROSUVASTATIN CALCIUM 20 MG: 20 TABLET, COATED ORAL at 21:17

## 2019-05-04 RX ADMIN — Medication 10 ML: at 21:17

## 2019-05-04 ASSESSMENT — PAIN SCALES - GENERAL
PAINLEVEL_OUTOF10: 0

## 2019-05-04 NOTE — PROGRESS NOTES
Endocrine dysfunction     Signs and symptoms:  as evidenced by Diet history of poor intake, Lab values(hypoglycemia)    Objective Information:  · Nutrition-Focused Physical Findings: No BM since admission; no edema   · Wound Type: None  · Current Nutrition Therapies:  · Oral Diet Orders: Carb Control 3 Carbs/Meal   · Oral Diet intake: %  · Oral Nutrition Supplement (ONS) Orders: None  · Anthropometric Measures:  · Ht: 5' 1\" (154.9 cm)   · Current Body Wt: 128 lb 4.9 oz (58.2 kg)  · Admission Body Wt: 128 lb 4.9 oz (58.2 kg)   · % Weight Change:    3% loss x1 month, not considered significant per guideliens  · Ideal Body Wt: 105 lb (47.6 kg)   · BMI Classification: BMI 18.5 - 24.9 Normal Weight    Nutrition Interventions:   Continue current diet, Start ONS  Education Initiated    Nutrition Evaluation:   · Evaluation: Goals set   · Goals: Pt will consume >50% of meals and ONS offered w/ POCG WNL     · Monitoring: Meal Intake, Supplement Intake, Pertinent Labs      Electronically signed by Jacy Juárez RD, LD on 5/4/19 at 1:01 PM    Contact Number:   Pager: [phone]  Office: [phone]

## 2019-05-04 NOTE — PROGRESS NOTES
ICU Progress Note        PGY1    Hospital Day: 2                                                         Code:Full Code  Admit Date: 5/3/2019                                 PCP: Erik Rosas MD    ICU Day: 2  Diet: DIET CARB CONTROL; Carb Control: 3 carb choices (45 gms)/meal  Dietary Nutrition Supplements: Diabetic Oral Supplement    Daily Plan:  2019    Subjective:     Patient reports feeling well, wanting to go home. Glucose was 95 overnight, started on D5 at 50 mL/hr. Ate breakfast this morning of 2 slices Luxembourgish toast, 3 khanna, one fruit cup and one orange juice, glc increased to 440. She states she drinks wine occasionally. Denied answers to CAGE. Was started on Jardiance by PCP one month ago, HgB A1c 8.8 on 3/12/2019. She does not have a consistent diet and states she does not eat much, has had poor appetite. Denies supplements other than daily multivitamin. Her hypoglycemia seems related to her \"forgetting or being too busy to eat. \"  Last time this happened to her requiring hospitalization was two years ago at a hospital in [facility].      Medications:     Scheduled Meds:   sodium chloride flush  10 mL Intravenous 2 times per day    enoxaparin  40 mg Subcutaneous Daily    aspirin  81 mg Oral Daily    rosuvastatin  20 mg Oral Nightly     Continuous Infusions:   dextrose       PRN Meds:sodium chloride flush, magnesium hydroxide, ondansetron, glucose, dextrose, glucagon (rDNA), dextrose    Objective:   Vitals  T-max: Temp (24hrs), Av.1 °F (36.7 °C), Min:97.5 °F (36.4 °C), Max:98.4 °F (36.9 °C)    Patient Vitals for the past 8 hrs:   BP Temp Temp src Pulse Resp SpO2   19 0900 100/81 -- -- 119 -- --   19 0826 89/64 97.8 °F (36.6 °C) Oral 97 15 100 %   19 0600 -- -- -- 80 -- --       Intake/Output Summary (Last 24 hours) at 2019 1206  Last data filed at 2019 1036  Gross per 24 hour   Intake 1003.37 ml Output --   Net 1003.37 ml     Physical Examination:     · General appearance: Appears comfortable at rest, fully alert and orientated    · HEENT: Atraumatic, normocephalic, moist mucus membranes  · Respiratory: Normal respiratory effort. No wheezes, rubs, or crackles  · Cardiovascular: regular S1/S2, with no Murmur, rub or gallop. No JVD  · Abdomen: Soft, non-tender, non-distended  · Musculoskeletal: No clubbing, cyanosis, no lower extremity edema, peripheral pulses present, cap refill < 2sec  · Neurologic: Neurovascularly grossly intact without any focal motor deficits. Cranial nerves:  grossly non-focal.    LABS    CBC:   Recent Labs     05/03/19  0746   WBC 7.4   HGB 11.6*   HCT 35.1*      MCV 82.5     Renal:   Recent Labs     05/03/19  0746 05/03/19  2141 05/04/19  0313    138 140   K 5.4* 4.9 5.0    101 103   CO2 25 26 27   BUN 17 12 13   CREATININE 1.2 1.1 1.2   GLUCOSE 57* 240* 123*   CALCIUM 10.0 9.6 9.6   ANIONGAP 12 11 10     Hepatic:   Recent Labs     05/03/19  0746   AST 28   ALT 21   BILITOT <0.2   PROT 8.3*   LABALBU 4.4   ALKPHOS 63     Troponin:   Recent Labs     05/03/19  0746   TROPONINI <0.01     BNP: No results for input(s): BNP in the last 72 hours. Lipids: No results for input(s): CHOL, HDL in the last 72 hours. Invalid input(s): LDLCALCU, TRIGLYCERIDE  ABGs:  No results for input(s): PHART, JRF3MWL, PO2ART, GRX6YHN, BEART, THGBART, S9KUKGEV, MRV1WMN in the last 72 hours. INR:   Recent Labs     05/03/19  0746   INR 0.93     Lactate: No results for input(s): LACTATE in the last 72 hours. -----------------------------------------------------------------  Imaging:  XR CHEST 1 VW   Final Result   1. No acute cardiopulmonary abnormalities. CT Head WO Contrast   Final Result      No acute hemorrhage      MRI BRAIN WO CONTRAST    (Results Pending)       Assessment/Plan:    This is a 66year old Type II DM female presents with hypoglycemia and right sided weakness/numbness along with slurred speech     TIA vs. Acute metabolic encephalopathy 2/2 hypoglycemia  -Reported of stroke like symptoms, right sided weakness along with slurred speech, confusion. Whipple's triad met: sx with glc <55, Resolved after treatment of hypoglycemia. Lasted less than 20 minutes. -Aspirin, statin per home use  -CT head wnl, f/u MRI brain for encephalopathy   -pt now without focal neurological deficits  -Holding home oral diabetic regimen  -off D5, carb control diet started  -Glucose checks q2h  -neuro checks Q4 hours  -PT/OT     Hypoglycemia  -Likely secondary to poor oral intake as well as Oral hypoglycemic agents  -POCT glucose check q2h  -May need D10 if BG does not improve  - advised to avoid alcohol intake   - if glucose stabilizes without supplementation of D5, can likely be discharged to home on home regimen with discontinuation of glimepiride to avoid risk of reactive hypoglycemia upon discharge  -Resume home metformin 2000 mg PO qD, Januvia 100 mg PO qD and Jardiance 10 mg PO qD   - stop Glimepiride 8mg PO qD  - low dose SSI  -hypoglycemia protocol     HTN  -Resumed home lisinopril     HLD  -resumed home crestor    Code Status:  Full  FEN: carb control diet  PPX: lovenox  DISPO: transfer to [facility]    W/D/W/A  -----------------------------  Shreyas Rae, PGY1  2/0/1015  12:06 PM     Patient seen, examined and discussed with the resident and I agree with the assessment and plan. Went off and on the D5 drip overnight after I stopped the octreotide. I decreased her D5 to 25ml/hr and then she had a big breakfast and her sugars went into the 400s. D5 stopped. Since her appetite is suspect of late I would stop the Glimepiride permanently and re-introduce the other antihyperglycemics. Can transfer out of ICU.       Nicolette Aldridge MD

## 2019-05-04 NOTE — DISCHARGE SUMMARY
Hospital Medicine Discharge Summary    Patient ID: Srini Foley   Gender: female  : 1955   Age: 66 y.o. MRN: [id number]  Code Status: Full Code   Patient's PCP: Pratibha Santiago MD    Admit Date: 5/3/2019     Discharge Date:   2019    Admitting Physician: Nurys Fu MD     Discharge Physician: Kishore Santiago MD     Discharge Diagnoses: Active Hospital Problems    Diagnosis Date Noted    Hypoglycemia [E16.2] 2019       The patient was seen and examined on day of discharge and this discharge summary is in conjunction with any daily progress note from day of discharge. Hospital Course:    66year old female with past medical history of Type II DM and HTN presents with confusion, right sided numbness, and slurred speech after waking up from sleep. Blood glucose checked by  to be in low 30s. Haydenippneo's triad met: sx with glc <55, Resolved after treatment of hypoglycemia. Lasted less than 20 minutes. Her home diabetic medications included metformin, glimeperide, Jardiance and Januvia. Was started on Jardiance by PCP one month ago, HgB A1c 8.8 on 3/12/2019. She does not have a consistent diet and states she does not eat much due to poor appetite or just \"forgetting or being too busy to eat. \" Denies supplements other than daily multivitamin. She endorses occasional alcohol use and had a glass of wine the night-prior to her TIA-like episode. Her hypoglycemia seems likely 2/2 to poor oral intake as well as her sulfonylurea use. While in the hospital, pt was initially in ICU for D5 gtt. Once sugars were stabilized, her blood sugars were improved with cutting down on diabetes medications. Pt was discharged on metformin 1000mg BID and jardiance 12.5 mg. Pt also continued to be hypotensive. Orthostats were positive for pt. Cortisol testing was indeterminate, pt had cosyntropin test that showed normal response.    ECHO was also done to rule out causes of hypotension, hypoglycemia that was WNL (EF 55%, mild MR, no diastolic dysfunction). Disposition:  Home    Physical Exam Performed:     /73   Pulse 98   Temp 97.8 °F (36.6 °C) (Oral)   Resp 18   Ht 5' 1\" (1.549 m)   Wt 128 lb 4.9 oz (58.2 kg)   SpO2 100%   BMI 24.24 kg/m²     Physical Exam   Constitutional: She is oriented to person, place, and time. She appears well-developed and well-nourished. No distress. HENT:   Head: Normocephalic and atraumatic. Eyes: EOM are normal.   Neck: Normal range of motion. Neck supple. Cardiovascular: Normal rate and regular rhythm. No murmur heard. Pulmonary/Chest: Effort normal and breath sounds normal. She has no wheezes. She has no rales. Abdominal: Soft. Bowel sounds are normal. She exhibits no distension. Musculoskeletal: Normal range of motion. Neurological: She is alert and oriented to person, place, and time. Skin: Skin is warm and dry. Capillary refill takes less than 2 seconds. Psychiatric: She has a normal mood and affect. Her behavior is normal.      Labs: For convenience and continuity at follow-up the following most recent labs are provided:      CBC:    Lab Results   Component Value Date    WBC 7.4 05/06/2019    HGB 10.6 05/06/2019    HCT 33.0 05/06/2019     05/06/2019       Renal:    Lab Results   Component Value Date     05/07/2019    K 4.7 05/07/2019     05/07/2019    CO2 24 05/07/2019    BUN 22 05/07/2019    CREATININE 1.0 05/07/2019    CALCIUM 9.3 05/07/2019         Significant Diagnostic Studies    Radiology:   MRI BRAIN WO CONTRAST   Final Result      1. No acute intracranial abnormality. 2. Mild nonspecific punctate white matter signal abnormality suggesting mild chronic small vessel ischemic disease and/or age related degenerative change. 3. Mild ethmoid and left frontal sinus disease and partial opacification of the right mastoid air cells. XR CHEST 1 VW   Final Result   1.  No acute

## 2019-05-04 NOTE — PROGRESS NOTES
POCT glucose 95, was 240 4 hours ago. Discussed with ICU residents, D5 restarted at 50 ml/hr d/t large drop. Will re-check BG in one hour    Upon assessment patient was answering questions appropriately and continues to deny pain at this time. Will continue to monitor.

## 2019-05-04 NOTE — PROGRESS NOTES
Hospitalist Progress Note      PCP: Jasmin Herrera MD    Date of Admission: 5/3/2019    Chief Complaint: Hypoglycemia    Hospital Course:   Overnight blood sugars have stabilized and in fact now in 400s   dextrose and octreotide have been discontinued  Chest pain shortness of breath  No abdominal pain nausea vomiting          Medications:  Reviewed      Exam:    /71   Pulse 102   Temp 98 °F (36.7 °C) (Oral)   Resp 15   Ht 5' 1\" (1.549 m)   Wt 128 lb 4.9 oz (58.2 kg)   SpO2 100%   BMI 24.24 kg/m²     General appearance: No apparent distress, appears stated age and cooperative. HEENT: Pupils equal, round, and reactive to light. Conjunctivae/corneas clear. Neck: Supple, with full range of motion. No jugular venous distention. Trachea midline. Respiratory:  Normal respiratory effort. Clear to auscultation, bilaterally without RALES/WHEEZES/Rhonchi. Cardiovascular: Regular rate and rhythm with normal S1/S2 without MURMURS, rubs or gallops. Abdomen: Soft, non-tender, non-distended with normal bowel sounds. Musculoskeletal: No clubbing, cyanosis or EDEMA bilaterally. Full range of motion without deformity. Skin: Skin color, texture, turgor normal.  No rashes or lesions. Neurologic:  Neurovascularly intact without any focal sensory/motor deficits.  Cranial nerves: II-XII intact, grossly non-focal.  Psychiatric: Alert and oriented, thought content appropriate, normal insight      Labs:   Recent Labs     05/03/19  0746   WBC 7.4   HGB 11.6*   HCT 35.1*        Recent Labs     05/03/19  0746 05/03/19  2141 05/04/19  0313    138 140   K 5.4* 4.9 5.0    101 103   CO2 25 26 27   BUN 17 12 13   CREATININE 1.2 1.1 1.2   CALCIUM 10.0 9.6 9.6     Recent Labs     05/03/19  0746   AST 28   ALT 21   BILITOT <0.2   ALKPHOS 63     Recent Labs     05/03/19  0746   INR 0.93     Recent Labs     05/03/19  0746   TROPONINI <0.01       Urinalysis:      Lab Results   Component Value Date    BLOODU N 04/30/2018    SPECGRAV 1.025 04/30/2018    GLUCOSEU N 04/30/2018       Radiology:  XR CHEST 1 VW   Final Result   1. No acute cardiopulmonary abnormalities.       CT Head WO Contrast   Final Result      No acute hemorrhage      MRI BRAIN WO CONTRAST    (Results Pending)       Assessment/Plan:    Active Hospital Problems    Diagnosis Date Noted    Hypoglycemia [E16.2] 05/03/2019       Acute Medical Issues Being Addressed:    21-year-old woman admitted with altered mental status and hypoglycemia  Acute metabolic encephalopathy secondary to hypoglycemia  Currently awake alert oriented     hypoglycemia secondary to oral anti-hypoglycemics in setting of poor oral intake  Improved with dextrose  Will discontinue dextrose    Type 2 diabetes mellitus on oral medication  Restart all her oral medications except for [facility] right  A1c 8.3  Sliding scale insulin  Can be transferred out of ICU onto her resident team  Will keep blood sugars stable prior to discharge possibly tomorrow    Right-sided weakness  Probably related to hypoglycemia however the unilaterality of symptoms would not entirely be explained by hypoglycemia  Will get MRI of the brain  CT of the head was negative        DVT Prophylaxis: sc lovenox   Diet: DIET CARB CONTROL; Carb Control: 3 carb choices (45 gms)/meal  Dietary Nutrition Supplements: Diabetic Oral Supplement  Code Status: Full Code      Dispo - once acute medical processes have resolved    Anamaria Engle MD

## 2019-05-05 LAB
ANION GAP SERPL CALCULATED.3IONS-SCNC: 11 MMOL/L (ref 3–16)
BUN BLDV-MCNC: 21 MG/DL (ref 7–20)
CALCIUM SERPL-MCNC: 9.4 MG/DL (ref 8.3–10.6)
CHLORIDE BLD-SCNC: 104 MMOL/L (ref 99–110)
CO2: 25 MMOL/L (ref 21–32)
CORTISOL TOTAL: 7.8 UG/DL
CREAT SERPL-MCNC: 1.2 MG/DL (ref 0.6–1.2)
GFR AFRICAN AMERICAN: 55
GFR NON-AFRICAN AMERICAN: 46
GLUCOSE BLD-MCNC: 113 MG/DL (ref 70–99)
GLUCOSE BLD-MCNC: 129 MG/DL (ref 70–99)
GLUCOSE BLD-MCNC: 150 MG/DL (ref 70–99)
GLUCOSE BLD-MCNC: 231 MG/DL (ref 70–99)
GLUCOSE BLD-MCNC: 58 MG/DL (ref 70–99)
GLUCOSE BLD-MCNC: 73 MG/DL (ref 70–99)
GLUCOSE BLD-MCNC: 82 MG/DL (ref 70–99)
GLUCOSE BLD-MCNC: 85 MG/DL (ref 70–99)
GLUCOSE BLD-MCNC: 91 MG/DL (ref 70–99)
GLUCOSE BLD-MCNC: 92 MG/DL (ref 70–99)
LACTIC ACID: 1.4 MMOL/L (ref 0.4–2)
LIPASE: 173 U/L (ref 13–60)
PERFORMED ON: ABNORMAL
PERFORMED ON: NORMAL
POTASSIUM REFLEX MAGNESIUM: 4.5 MMOL/L (ref 3.5–5.1)
SODIUM BLD-SCNC: 140 MMOL/L (ref 136–145)

## 2019-05-05 PROCEDURE — 6370000000 HC RX 637 (ALT 250 FOR IP): Performed by: STUDENT IN AN ORGANIZED HEALTH CARE EDUCATION/TRAINING PROGRAM

## 2019-05-05 PROCEDURE — 83605 ASSAY OF LACTIC ACID: CPT

## 2019-05-05 PROCEDURE — 2580000003 HC RX 258: Performed by: STUDENT IN AN ORGANIZED HEALTH CARE EDUCATION/TRAINING PROGRAM

## 2019-05-05 PROCEDURE — 82024 ASSAY OF ACTH: CPT

## 2019-05-05 PROCEDURE — 83690 ASSAY OF LIPASE: CPT

## 2019-05-05 PROCEDURE — 6360000002 HC RX W HCPCS: Performed by: STUDENT IN AN ORGANIZED HEALTH CARE EDUCATION/TRAINING PROGRAM

## 2019-05-05 PROCEDURE — 1200000000 HC SEMI PRIVATE

## 2019-05-05 PROCEDURE — 36415 COLL VENOUS BLD VENIPUNCTURE: CPT

## 2019-05-05 PROCEDURE — 82533 TOTAL CORTISOL: CPT

## 2019-05-05 PROCEDURE — 80048 BASIC METABOLIC PNL TOTAL CA: CPT

## 2019-05-05 RX ADMIN — ASPIRIN 81 MG 81 MG: 81 TABLET ORAL at 08:38

## 2019-05-05 RX ADMIN — Medication 10 ML: at 08:39

## 2019-05-05 RX ADMIN — ENOXAPARIN SODIUM 40 MG: 40 INJECTION SUBCUTANEOUS at 08:38

## 2019-05-05 RX ADMIN — Medication 10 ML: at 21:15

## 2019-05-05 RX ADMIN — LINAGLIPTIN 5 MG: 5 TABLET, FILM COATED ORAL at 08:38

## 2019-05-05 RX ADMIN — INSULIN LISPRO 1 UNITS: 100 INJECTION, SOLUTION INTRAVENOUS; SUBCUTANEOUS at 12:38

## 2019-05-05 RX ADMIN — METFORMIN HYDROCHLORIDE 1000 MG: 500 TABLET ORAL at 08:38

## 2019-05-05 RX ADMIN — ROSUVASTATIN CALCIUM 20 MG: 20 TABLET, COATED ORAL at 21:15

## 2019-05-05 RX ADMIN — METFORMIN HYDROCHLORIDE 1000 MG: 500 TABLET ORAL at 17:49

## 2019-05-05 ASSESSMENT — PAIN SCALES - GENERAL
PAINLEVEL_OUTOF10: 0

## 2019-05-05 NOTE — PROGRESS NOTES
Progress Note  PGY-1    Hospital Day: 3                                                         Code:Full Code  Admit Date: 5/3/2019                                 PCP: Asha Jones MD                     Daily Plan:  5/5/2019       CC: hypoglycemia     Subjective:   Was feeling somewhat lightheaded this morning after coming from the bathroom. Had BG of 58 this morning, asymptomatic, improved with food. No fevers, no chills. MEDICATIONS:   Scheduled Meds:   empagliflozin  25 mg Oral Daily    linagliptin  5 mg Oral Daily    metFORMIN  1,000 mg Oral BID WC    insulin lispro  0-6 Units Subcutaneous TID WC    insulin lispro  0-3 Units Subcutaneous Nightly    sodium chloride flush  10 mL Intravenous 2 times per day    enoxaparin  40 mg Subcutaneous Daily    aspirin  81 mg Oral Daily    rosuvastatin  20 mg Oral Nightly      Continuous Infusions:   dextrose       PRN Meds:sodium chloride flush, magnesium hydroxide, ondansetron, glucose, dextrose, glucagon (rDNA), dextrose    Allergies: No Known Allergies    PHYSICAL EXAM:       Vitals: /70   Pulse 79   Temp 98.5 °F (36.9 °C) (Oral)   Resp 18   Ht 5' 1\" (1.549 m)   Wt 128 lb 4.9 oz (58.2 kg)   SpO2 95%   BMI 24.24 kg/m²     I/O:      Intake/Output Summary (Last 24 hours) at 5/5/2019 0741  Last data filed at 5/4/2019 1511  Gross per 24 hour   Intake 914 ml   Output --   Net 914 ml     No intake/output data recorded. I/O last 3 completed shifts: In: 65 [P.O.:720; I.V.:194]  Out: -       Physical Exam   Constitutional: She is oriented to person, place, and time. She appears well-developed and well-nourished. No distress. HENT:   Head: Normocephalic and atraumatic. Eyes: EOM are normal.   Neck: Normal range of motion. Neck supple. Cardiovascular: Normal rate and regular rhythm. No murmur heard. Pulmonary/Chest: Effort normal and breath sounds normal. She has no wheezes. She has no rales. Abdominal: Soft.  Bowel sounds are normal. She exhibits no distension. Musculoskeletal: Normal range of motion. Neurological: She is alert and oriented to person, place, and time. Skin: Skin is warm and dry. Capillary refill takes less than 2 seconds. Psychiatric: She has a normal mood and affect. Her behavior is normal.         No results for input(s): PHART, XLT8XPM, PO2ART in the last 72 hours. DATA:       Labs  CBC:   Recent Labs     05/03/19  0746   WBC 7.4   HGB 11.6*   HCT 35.1*          BMP:   Recent Labs     05/03/19  2141 05/04/19  0313 05/05/19  0424    140 140   K 4.9 5.0 4.5    103 104   CO2 26 27 25   BUN 12 13 21*   CREATININE 1.1 1.2 1.2   GLUCOSE 240* 123* 91     LFT's:   Recent Labs     05/03/19  0746   AST 28   ALT 21   BILITOT <0.2   ALKPHOS 63     Troponin:   Recent Labs     05/03/19  0746   TROPONINI <0.01     BNP: No results for input(s): BNP in the last 72 hours. ABGs: No results for input(s): PHART, RHE1LVQ, PO2ART in the last 72 hours. INR:   Recent Labs     05/03/19  0746   INR 0.93     Lipids: No results for input(s): CHOL, HDL in the last 72 hours. Invalid input(s): LDLCALCU    U/A:No results for input(s): NITRITE, COLORU, PHUR, LABCAST, WBCUA, RBCUA, MUCUS, TRICHOMONAS, YEAST, BACTERIA, CLARITYU, SPECGRAV, LEUKOCYTESUR, UROBILINOGEN, BILIRUBINUR, BLOODU, GLUCOSEU, AMORPHOUS in the last 72 hours. Invalid input(s): Yessica Stewart     ASSESSMENT AND PLAN:     Active Hospital Problems    Diagnosis Date Noted    Hypoglycemia [E16.2] 05/03/2019         Neri Mccullough, 66 y.o. female admitted for hypoglycemia and right sided weakness/numbness along with slurred speech     TIA vs. Acute metabolic encephalopathy 2/2 hypoglycemia  -Reported of stroke like symptoms, right sided weakness along with slurred speech, confusion. Whipple's triad met: sx with glc <55, Resolved after treatment of hypoglycemia. Lasted less than 20 minutes.    -Aspirin, statin per home use  -CT head wnl  -MRI brain for encephalopathy pending   -pt now without focal neurological deficits  -Holding home oral diabetic regimen  -off D5, carb control diet started  -Glucose checks q4h  -neuro checks Q4 hours  -PT/OT     Hypoglycemia  -Likely secondary to poor oral intake as well as Oral hypoglycemic agents, however want to rule out infectious, other endocrine abnormalities  -cortisol 7.8  -UA pending   -POCT glucose check q2h  -May need D10 if BG does not improve  - advised to avoid alcohol intake   -home metformin 2000 mg PO qD, Januvia 100 mg PO qD and Jardiance 10 mg PO qD   - low dose SSI  -hypoglycemia protocol     HTN  -Resumed home lisinopril     HLD  -resumed home crestor        DVT Prophylaxis: lovenox 40  Diet: DIET CARB CONTROL; Carb Control: 3 carb choices (45 gms)/meal  Dietary Nutrition Supplements: Diabetic Oral Supplement  Code Status: Full Code    PT/OT Eval Status: consulted     Alexx Robertson MD    I will discuss the patient with the senior resident and Frank Ruff MD      -----------------------------  Majo Loza PGY-1  [employer]   5/5/2019  7:41 AM

## 2019-05-05 NOTE — PROGRESS NOTES
Patient's blood sugar 58. Patient is asymptomatic.   Patient will eat breakfast.  Will re check blood sugar and monitor for symptoms of hypoglycemia

## 2019-05-05 NOTE — PROGRESS NOTES
INTERNAL MEDICINE TRANSFER ACCEPT NOTE    S: Briefly this is a 67 yo F w PMH NIDDM, HTN p/w confusion, right sided shaking and weakness along with slurred speech.  recalled a similar episode of confusion a few years ago due to hypoglycemia and thus checked pt's BG; it was found to be in low 30s, he gave her two glasses of orange juice and her symptoms immediately improved but did not completely resolve. Pt was taken to [facility] initially where she had a [address] woc which was neg for ICH. Her BG sugar dropped again to 50s. She was transferred to [facility] for further management. Pt has been taking 4 PO diabetic meds Jardiance, metformin, januvia and glimepiride. Transferred to [facility] for further management. Today patient is doing better, wants to go home. Glucose was 95 overnight. Hypoglycemia could be related to her forgetting to eat and being on 4 diabetic medications. D5 off. Will likely need to be off the Glimepiride permanently and re-introduce the other antihyperglycemics. O:  /68   Pulse 99   Temp 98.1 °F (36.7 °C) (Oral)   Resp 15   Ht 5' 1\" (1.549 m)   Wt 128 lb 4.9 oz (58.2 kg)   SpO2 98%   BMI 24.24 kg/m²     · General appearance: Appears comfortable at rest, fully alert and orientated    · HEENT: Atraumatic, normocephalic, moist mucus membranes  · Respiratory: Normal respiratory effort. No wheezes, rubs, or crackles  · Cardiovascular: regular S1/S2, with no Murmur, rub or gallop. No JVD  · Abdomen: Soft, non-tender, non-distended  · Musculoskeletal: No clubbing, cyanosis, no lower extremity edema, peripheral pulses present, cap refill < 2sec  · Neurologic: Neurovascularly grossly intact without any focal motor deficits. Cranial nerves:  grossly non-focal.    A/P:  Please refer to today's ICU resident's progress note for detailed assessment and plan.     FEN: DIET CARB CONTROL; Carb Control: 3 carb choices (45 gms)/meal  Dietary Nutrition Supplements: Diabetic Oral Supplement  PPx: Lovenox  CODE: Full Code  DISPO: GMF    The objective and subjective findings as well as the ICU course of treatment have been reviewed with the ICU team. The treatment plan has been reviewed with the ICU team. The patient is being transferred to [place] [id number] in stable condition.     Trish Dickson MD  Internal Medicine, PGY1  Pager: [phone]

## 2019-05-05 NOTE — PROGRESS NOTES
Called report to [facility] . Transported patient with belongings. Placed patient on heart monitor and took vital signs. oriented patient to new room and patient given call light.

## 2019-05-06 ENCOUNTER — APPOINTMENT (OUTPATIENT)
Dept: MRI IMAGING | Age: 64
DRG: 637 | End: 2019-05-06
Payer: COMMERCIAL

## 2019-05-06 LAB
ANION GAP SERPL CALCULATED.3IONS-SCNC: 12 MMOL/L (ref 3–16)
BASOPHILS ABSOLUTE: 0.2 K/UL (ref 0–0.2)
BASOPHILS RELATIVE PERCENT: 2.5 %
BUN BLDV-MCNC: 23 MG/DL (ref 7–20)
CALCIUM SERPL-MCNC: 9.6 MG/DL (ref 8.3–10.6)
CHLORIDE BLD-SCNC: 103 MMOL/L (ref 99–110)
CO2: 24 MMOL/L (ref 21–32)
CREAT SERPL-MCNC: 1 MG/DL (ref 0.6–1.2)
EKG ATRIAL RATE: 103 BPM
EKG DIAGNOSIS: NORMAL
EKG P AXIS: 69 DEGREES
EKG P-R INTERVAL: 144 MS
EKG Q-T INTERVAL: 336 MS
EKG QRS DURATION: 68 MS
EKG QTC CALCULATION (BAZETT): 440 MS
EKG R AXIS: 17 DEGREES
EKG T AXIS: 46 DEGREES
EKG VENTRICULAR RATE: 103 BPM
EOSINOPHILS ABSOLUTE: 0.4 K/UL (ref 0–0.6)
EOSINOPHILS RELATIVE PERCENT: 4.8 %
GFR AFRICAN AMERICAN: >60
GFR NON-AFRICAN AMERICAN: 56
GLUCOSE BLD-MCNC: 110 MG/DL (ref 70–99)
GLUCOSE BLD-MCNC: 119 MG/DL (ref 70–99)
GLUCOSE BLD-MCNC: 174 MG/DL (ref 70–99)
GLUCOSE BLD-MCNC: 192 MG/DL (ref 70–99)
GLUCOSE BLD-MCNC: 74 MG/DL (ref 70–99)
GLUCOSE BLD-MCNC: 93 MG/DL (ref 70–99)
GLUCOSE BLD-MCNC: 97 MG/DL (ref 70–99)
GLUCOSE BLD-MCNC: 98 MG/DL (ref 70–99)
HCT VFR BLD CALC: 33 % (ref 36–48)
HEMOGLOBIN: 10.6 G/DL (ref 12–16)
LYMPHOCYTES ABSOLUTE: 2.2 K/UL (ref 1–5.1)
LYMPHOCYTES RELATIVE PERCENT: 30.5 %
MCH RBC QN AUTO: 26 PG (ref 26–34)
MCHC RBC AUTO-ENTMCNC: 32 G/DL (ref 31–36)
MCV RBC AUTO: 81.3 FL (ref 80–100)
MONOCYTES ABSOLUTE: 0.7 K/UL (ref 0–1.3)
MONOCYTES RELATIVE PERCENT: 9.1 %
NEUTROPHILS ABSOLUTE: 3.9 K/UL (ref 1.7–7.7)
NEUTROPHILS RELATIVE PERCENT: 53.1 %
PDW BLD-RTO: 13.6 % (ref 12.4–15.4)
PERFORMED ON: ABNORMAL
PERFORMED ON: NORMAL
PLATELET # BLD: 318 K/UL (ref 135–450)
PMV BLD AUTO: 7 FL (ref 5–10.5)
POTASSIUM REFLEX MAGNESIUM: 4.7 MMOL/L (ref 3.5–5.1)
RBC # BLD: 4.06 M/UL (ref 4–5.2)
SODIUM BLD-SCNC: 139 MMOL/L (ref 136–145)
T4 FREE: 1.1 NG/DL (ref 0.9–1.8)
WBC # BLD: 7.4 K/UL (ref 4–11)

## 2019-05-06 PROCEDURE — 93010 ELECTROCARDIOGRAM REPORT: CPT | Performed by: INTERNAL MEDICINE

## 2019-05-06 PROCEDURE — 97165 OT EVAL LOW COMPLEX 30 MIN: CPT

## 2019-05-06 PROCEDURE — 80048 BASIC METABOLIC PNL TOTAL CA: CPT

## 2019-05-06 PROCEDURE — 6370000000 HC RX 637 (ALT 250 FOR IP): Performed by: STUDENT IN AN ORGANIZED HEALTH CARE EDUCATION/TRAINING PROGRAM

## 2019-05-06 PROCEDURE — 1200000000 HC SEMI PRIVATE

## 2019-05-06 PROCEDURE — 36415 COLL VENOUS BLD VENIPUNCTURE: CPT

## 2019-05-06 PROCEDURE — 70551 MRI BRAIN STEM W/O DYE: CPT

## 2019-05-06 PROCEDURE — 81001 URINALYSIS AUTO W/SCOPE: CPT

## 2019-05-06 PROCEDURE — 2580000003 HC RX 258: Performed by: STUDENT IN AN ORGANIZED HEALTH CARE EDUCATION/TRAINING PROGRAM

## 2019-05-06 PROCEDURE — 6360000002 HC RX W HCPCS: Performed by: STUDENT IN AN ORGANIZED HEALTH CARE EDUCATION/TRAINING PROGRAM

## 2019-05-06 PROCEDURE — 97161 PT EVAL LOW COMPLEX 20 MIN: CPT

## 2019-05-06 PROCEDURE — 84439 ASSAY OF FREE THYROXINE: CPT

## 2019-05-06 PROCEDURE — 97116 GAIT TRAINING THERAPY: CPT

## 2019-05-06 PROCEDURE — 97530 THERAPEUTIC ACTIVITIES: CPT

## 2019-05-06 PROCEDURE — 85025 COMPLETE CBC W/AUTO DIFF WBC: CPT

## 2019-05-06 RX ORDER — SODIUM CHLORIDE 9 MG/ML
INJECTION, SOLUTION INTRAVENOUS CONTINUOUS
Status: DISCONTINUED | OUTPATIENT
Start: 2019-05-06 | End: 2019-05-06

## 2019-05-06 RX ORDER — 0.9 % SODIUM CHLORIDE 0.9 %
500 INTRAVENOUS SOLUTION INTRAVENOUS ONCE
Status: COMPLETED | OUTPATIENT
Start: 2019-05-06 | End: 2019-05-06

## 2019-05-06 RX ORDER — COSYNTROPIN 0.25 MG/ML
250 INJECTION, POWDER, FOR SOLUTION INTRAMUSCULAR; INTRAVENOUS ONCE
Status: COMPLETED | OUTPATIENT
Start: 2019-05-07 | End: 2019-05-07

## 2019-05-06 RX ORDER — SODIUM CHLORIDE 9 MG/ML
INJECTION, SOLUTION INTRAVENOUS CONTINUOUS
Status: DISCONTINUED | OUTPATIENT
Start: 2019-05-06 | End: 2019-05-07

## 2019-05-06 RX ADMIN — Medication 10 ML: at 11:28

## 2019-05-06 RX ADMIN — SODIUM CHLORIDE: 9 INJECTION, SOLUTION INTRAVENOUS at 20:20

## 2019-05-06 RX ADMIN — ASPIRIN 81 MG 81 MG: 81 TABLET ORAL at 11:27

## 2019-05-06 RX ADMIN — ROSUVASTATIN CALCIUM 20 MG: 20 TABLET, COATED ORAL at 20:12

## 2019-05-06 RX ADMIN — METFORMIN HYDROCHLORIDE 1000 MG: 500 TABLET ORAL at 11:26

## 2019-05-06 RX ADMIN — ENOXAPARIN SODIUM 40 MG: 40 INJECTION SUBCUTANEOUS at 11:27

## 2019-05-06 RX ADMIN — SODIUM CHLORIDE 500 ML: 9 INJECTION, SOLUTION INTRAVENOUS at 15:06

## 2019-05-06 RX ADMIN — Medication 10 ML: at 20:13

## 2019-05-06 RX ADMIN — INSULIN LISPRO 1 UNITS: 100 INJECTION, SOLUTION INTRAVENOUS; SUBCUTANEOUS at 13:49

## 2019-05-06 RX ADMIN — SODIUM CHLORIDE: 9 INJECTION, SOLUTION INTRAVENOUS at 17:27

## 2019-05-06 RX ADMIN — METFORMIN HYDROCHLORIDE 1000 MG: 500 TABLET ORAL at 17:51

## 2019-05-06 ASSESSMENT — PAIN SCALES - GENERAL
PAINLEVEL_OUTOF10: 0
PAINLEVEL_OUTOF10: 0

## 2019-05-06 NOTE — PLAN OF CARE
Problem: Metabolic:  Goal: Ability to maintain appropriate glucose levels will improve  Description  Ability to maintain appropriate glucose levels will improve  5/6/2019 0615 by Rula Mireles RN  Outcome: Ongoing  Note:   BS WNL all shift. Pt educated on importance of maintaining blood sugar WNL. Pt verbalized understanding.

## 2019-05-06 NOTE — PROGRESS NOTES
mL Intravenous 2 times per day    enoxaparin  40 mg Subcutaneous Daily    aspirin  81 mg Oral Daily    rosuvastatin  20 mg Oral Nightly       Objective        Patient Active Problem List   Diagnosis Code    Essential hypertension I10    Diabetes mellitus, type II ([facility] [address] E11.9    Mixed hyperlipidemia E78.2    Iron deficiency anemia D50.9    Microalbuminuria R80.9    Thumb pain M79.646    Type 2 diabetes mellitus with peripheral neuropathy (HCC) E11.42    Uncontrolled type 2 diabetes mellitus with diabetic neuropathy (HCC) E11.40, E11.65    Hypoglycemia E16.2        BP 97/63   Pulse 91   Temp 97.5 °F (36.4 °C) (Oral)   Resp 16   Ht 5' 1\" (1.549 m)   Wt 128 lb 4.9 oz (58.2 kg)   SpO2 99%   BMI 24.24 kg/m²     HgBA1c:    Lab Results   Component Value Date    LABA1C 8.3 05/03/2019       Recent Labs     05/06/19  0417 05/06/19  0549 05/06/19  0952 05/06/19  1225   POCGLU 74 119* 192* 174*       BUN/Creatinine:    Lab Results   Component Value Date    BUN 23 05/06/2019    CREATININE 1.0 05/06/2019       Assessment        Diabetes Management and Education    Does the patient have a Primary Care Physician? Yes,      Does the patient require new medication instruction? To be determined    Person responsible for administration of Insulin/Medication:        [x] Self     [] Caregiver       [] Spouse       [] Other Family Member   []  Other    Level of patient/caregiver understanding able to:        [] Verbalized Understanding   [] Demonstrated Understanding       [] Teach Back       [x] Needs Reinforcement     []  Other:        Does the patient/caregiver monitor Blood Glucoses? Yes In the mornings  Pt encouraged to monitor her blood sugars 2X/day at various times. Does the patient/caregiver follow a Meal Plan? No:    Reviewed importance of eating three meals per day  for consistent carb intake.       Level of patient/caregiver understanding able to:     [] Verbalized Understanding   [] Demonstrated Understanding       [] Teach Back       [x] Needs Reinforcement     []  Other:      Does the patient/caregiver understand S/S of Hypoglycemia? Reviewed symptoms, prevention and treatment. Level of patient/caregiver understanding able to:     [] Verbalized Understanding   [] Demonstrated Understanding       [] Teach Back       [x] Needs Reinforcement     []  Other:                    Does the patient/caregiver understand S/S of Hyperglycemia? Reviewed symptoms, prevention and treatment. Level of patient/caregiver understanding able to:       [] Verbalized Understanding   [] Demonstrated Understanding       [] Teach Back       [x] Needs Reinforcement     []  Other:       Met with pt. Pt states that she has had diabetes X15 years and has had formal diabetes education in the past. Pt states that she was admitted with hypoglycemia and stated that she thought it was because she ate very little the night before the morning that it happened. Reviewed with pt target blood glucose & hgbA1c levels, hypoglycemia,foot care,eye care ,oral diabetes medications,blood glucose testing and the importance of eating every 4-5 hours. Pt verbalized understanding but needs reinforcement. Pt . Did state that she does not have  very big appetite and does not eat lunch. Pt reminded of the importance of eating lunch every day. Pt verbalized understanding. Pt given a diabetes education booklet. Will follow.      Plan        Dietary Consult Made:  Yes  Social Service Consult Made:  No  Hospice Care: N/A    Given Diabetic Meter: No   [] AccuCheck Sammi   [] One Touch   [] AccuCheck  [] Freestyle  [] Other:     Discharge Plan:  Recommend Outpatient Diabetic Education Classes : No  Placement for patient upon discharge: independent living        Electronically signed by Roberto Polo RN, MSN, CDE on 5/6/2019 at 3:41 PM

## 2019-05-06 NOTE — CARE COORDINATION
2019  [facility])  Clinical Case Management Department    Initial Assessment /Discharge Summary     Patient: Lana Thornton  MRN: [id number] / : 1955          Admission Documentation  Attending Provider: Queenie Finnegan MD  Admit date/time: 5/3/2019  7:28 AM  Status: Inpatient [101]  Diagnosis: Hypoglycemia     Readmission within last 30 days:  no     Living Situation:  Discharge Planning  Living Arrangements: Spouse/Significant Other  Support Systems: Spouse/Significant Other, Family Members  Potential Assistance Needed: N/A  Type of Home Care Services: None  Patient expects to be discharged to[de-identified] HOME  Expected Discharge Date: 19    Written Assessment:   Per H&P, This is a pleasant 66year old female with past medical history of Type II DM, HTN presents with confusion, right sided numbness, and slurred speech. Started this am per  after he woke her up. She was confused about time but aware of her place and surroundings. SW met with patient at bedside. Patient reported she is feeling better today. Patient is from home at baseline. She is not using any DME at home and is not active with any home care services. She had an MRI today and was hopeful to return home after MRI was read. Patient stated her spouse or family could provide transportation at discharge. Her spouse works till 3:00 pm.     SW provided contact information to patient/family and placed information on white board in room should needs arise. No other needs noted at this time.         Service Assessment:       Values / Beliefs  Do you have any ethnic, cultural, sacramental, or spiritual Spiritism needs you would like us to be aware of while you are in the hospital?: No    Advance Directives (For Healthcare)  Pre-existing DNR Comfort Care/DNR Arrest/DNI Order: No  Healthcare Directive: No, patient does not have an advance directive for healthcare treatment  Information on Healthcare Directives Requested: No  Patient Requests Assistance: Yes, will do independently  Advance Directives: Pt. not interested at this time      Destination:  Home      Durable Medical Equipment:   None      Home Health/Skilled Nursing Prior to Admission:  Home care at home?  No   Provider:    Provider Phone:    [address]:  N/A     Therapy Consults  PT evaluation needed?: No  OT Evalulation Needed?: No  SLP evaluation needed?: No      Pharmacy: No concerns noted    Potential Assistance Purchasing Medications:  No  Does patient want to participate in local refill/meds to beds program?: Not Assessed    Goals of Care:  Patient expects to be discharged to[de-identified] HOME  Patient plans for SNF/Placement: Home          Mode of transport from hospital: Spouse      Barriers to discharge: None      NALINI Whitt  [facility]  Case Management Department  Ph: [phone]

## 2019-05-06 NOTE — PROGRESS NOTES
Progress Note  PGY-1    Hospital Day: 4                                                         Code:Full Code  Admit Date: 5/3/2019                                 PCP: Erik Rosas MD                     Daily Plan:  5/6/2019       CC: hypoglycemia     Subjective:   BG improved ('s). Pt has had occasional lightheadedness. No dizziness, no chest pain, no SOB, no fevers, no chills, no nausea. MEDICATIONS:   Scheduled Meds:   empagliflozin  25 mg Oral Daily    metFORMIN  1,000 mg Oral BID WC    insulin lispro  0-6 Units Subcutaneous TID WC    insulin lispro  0-3 Units Subcutaneous Nightly    sodium chloride flush  10 mL Intravenous 2 times per day    enoxaparin  40 mg Subcutaneous Daily    aspirin  81 mg Oral Daily    rosuvastatin  20 mg Oral Nightly      Continuous Infusions:   dextrose       PRN Meds:sodium chloride flush, magnesium hydroxide, ondansetron, glucose, dextrose, glucagon (rDNA), dextrose    Allergies: No Known Allergies    PHYSICAL EXAM:       Vitals: BP (!) 84/56   Pulse 95   Temp 98.9 °F (37.2 °C) (Oral)   Resp 16   Ht 5' 1\" (1.549 m)   Wt 128 lb 4.9 oz (58.2 kg)   SpO2 99%   BMI 24.24 kg/m²     I/O:      Intake/Output Summary (Last 24 hours) at 5/6/2019 1420  Last data filed at 5/6/2019 1000  Gross per 24 hour   Intake 70 ml   Output 900 ml   Net -830 ml     I/O this shift: In: 79 [P.O.:70]  Out: -   I/O last 3 completed shifts:  In: -   Out: 900 [Urine:900]      Physical Exam   Constitutional: She is oriented to person, place, and time. She appears well-developed and well-nourished. No distress. HENT:   Head: Normocephalic and atraumatic. Eyes: EOM are normal.   Neck: Normal range of motion. Neck supple. Cardiovascular: Normal rate and regular rhythm. No murmur heard. Pulmonary/Chest: Effort normal and breath sounds normal. She has no wheezes. She has no rales. Abdominal: Soft. Bowel sounds are normal. She exhibits no distension.    Musculoskeletal: Normal range of motion. Neurological: She is alert and oriented to person, place, and time. Skin: Skin is warm and dry. Capillary refill takes less than 2 seconds. Psychiatric: She has a normal mood and affect. Her behavior is normal.         No results for input(s): PHART, EZA7OZA, PO2ART in the last 72 hours. DATA:       Labs  CBC:   No results for input(s): WBC, HGB, HCT, PLT in the last 72 hours. BMP:   Recent Labs     05/04/19  0313 05/05/19  0424 05/06/19  0807    140 139   K 5.0 4.5 4.7    104 103   CO2 27 25 24   BUN 13 21* 23*   CREATININE 1.2 1.2 1.0   GLUCOSE 123* 91 98     LFT's:   No results for input(s): AST, ALT, ALB, BILITOT, ALKPHOS in the last 72 hours. Troponin:   No results for input(s): TROPONINI in the last 72 hours. BNP: No results for input(s): BNP in the last 72 hours. ABGs: No results for input(s): PHART, XNU2UNM, PO2ART in the last 72 hours. INR:   No results for input(s): INR in the last 72 hours. Lipids: No results for input(s): CHOL, HDL in the last 72 hours. Invalid input(s): LDLCALCU    U/A:No results for input(s): NITRITE, COLORU, PHUR, LABCAST, WBCUA, RBCUA, MUCUS, TRICHOMONAS, YEAST, BACTERIA, CLARITYU, SPECGRAV, LEUKOCYTESUR, UROBILINOGEN, BILIRUBINUR, BLOODU, GLUCOSEU, AMORPHOUS in the last 72 hours.     Invalid input(s): Faiza Sanchez     ASSESSMENT AND PLAN:     Active Hospital Problems    Diagnosis Date Noted    Hypoglycemia [E16.2] 05/03/2019         Cecilia Santana, 66 y.o. female admitted for hypoglycemia and right sided weakness/numbness along with slurred speech     TIA vs. Acute metabolic encephalopathy 2/2 hypoglycemia  -Aspirin, statin   -CT head wnl  -MRI brain for encephalopathy negative for intracranial pathology   -jardiance, metformin 1000 BID  -d/c'ed glimepiride, tradjenta  -Glucose checks q4h  -neuro checks Q4 hours  -PT/OT     Hypoglycemia  -Likely secondary to poor oral intake as well as Oral hypoglycemic agents, however want to rule out infectious, other endocrine abnormalities  -cortisol 7.8  -UA pending   -POCT glucose check q2h  -May need D10 if BG does not improve  - advised to avoid alcohol intake   -home metformin 2000 mg PO qD, Januvia 100 mg PO qD and Jardiance 10 mg PO qD   - low dose SSI  -hypoglycemia protocol     Hypotension, hx of HTN  Most likely due to hypovolemia. Pt had positive orthostatic vitals done today. Could have some component of adrenal insufficiency contributing to hypotension, hypoglycemia.  -cosyntropin test today   -IVF at 100 ml/hr with 500 cc bolus prior. -holding home BP meds     HLD  -crestor      DVT Prophylaxis: lovenox 40  Diet: DIET CARB CONTROL; Carb Control: 3 carb choices (45 gms)/meal  Dietary Nutrition Supplements: Diabetic Oral Supplement  Code Status: Full Code    PT/OT Eval Status: consulted     Alexx Robertson MD    I will discuss the patient with the senior resident and Bonnie Thurman MD      -----------------------------  Majo Loza, PGY-1  [employer]   5/6/2019  2:20 PM    I have personally seen and evaluated this patient. I discussed findings and management with the resident, Dr. Kai Hyde, on 05/06/19  and agree as documented in this note     MRI brain without acute intracranial abnormalities. Continues to be hypotensive, intermittently lightheaded, orthostatics positive. No focal sensory or motor neurological deficits on exam, strength 5 x 5 in all extremities. CN grossly intact. She was on 40 mg lisinopril at home which we stopped on admission due to hypotension. I'm concerned about underlying hypoglycemia, hypertension, we'll check cosyntropin stim relation test to evaluate adrenal insufficiency. Normal TSH, will check free T4. If results unremarkable, will discharge tomorrow.     Carlos [facility]  Attending Physician

## 2019-05-06 NOTE — PROGRESS NOTES
Physical Therapy    Facility/Department: [address]  Initial Assessment and treatment/discharge    NAME: Chase Calzada  : 1955  MRN: [id number]    Date of Service: 2019    Discharge Recommendations:    Chase Calzada scored a 24/24 on the AM-PAC short mobility form. At this time, no further PT is recommended upon discharge due to patient independent with mobility. Recommend patient returns to prior setting with prior services. PT Equipment Recommendations  Equipment Needed: No    Assessment   Assessment: Patient tolerated session well, transferring and ambulating in room and hallways with steady gait without an assistive device. Patient able to perform stair training without deficits and with reciprocal pattern, requiring use of handrail when descending but no ascending. Patient currently functioning near baseline level and not in need of skilled PT at this time. Will discharge from acute care PT. Treatment Diagnosis: impaired mobility associated with hypoglycemia  Prognosis: Good  Decision Making: Low Complexity  Patient Education: Educated on role of PT, safety with mobility, d/c recommendations; patient verbalized understanding. No Skilled PT: Independent with functional mobility   REQUIRES PT FOLLOW UP: No  Activity Tolerance  Activity Tolerance: Patient Tolerated treatment well       Patient Diagnosis(es): The encounter diagnosis was Hypoglycemia. has a past medical history of Carpal tunnel syndrome, Hyperlipidemia, Hypertension, Iron deficiency anemia, Type 2 diabetes mellitus with peripheral neuropathy ([facility] [address] and Type II or unspecified type diabetes mellitus without mention of complication, not stated as uncontrolled. has a past surgical history that includes Tubal ligation; Endometrial ablation (); eye surgery (); Carpal tunnel release (Bilateral, 2006); and Colonoscopy (2016).     Restrictions  Position Activity Restriction  Other

## 2019-05-06 NOTE — PROGRESS NOTES
Occupational Therapy   Occupational Therapy Initial Assessment/Tx/Discharge Note  Date: 2019   Patient Name: Bridget Quintana  MRN: [id number]     : 1955    Date of Service: 2019  Assessment: Pt demonstrates safe mobility and no difficulty with ADLs. Activity tolerance is WFL. Pt has no acute OT needs and is safe to d/c home with prn assist.  Discharge Recommendations: Bridget Quintana scored a 24/24 on the AM-[facility] ADL Inpatient form. At this time, no further OT is recommended upon discharge. Recommend patient returns to prior setting with prior services. OT Equipment Recommendations  Equipment Needed: No    Assessment   Assessment: Pt demonstrates safe mobility and no difficulty with ADLs. Activity tolerance is WFL. Pt has no acute OT needs and is safe to d/c home with prn assist.   Decision Making: Low Complexity  Patient Education: Role of OT, d/c planning, activity promotion - verb understanding  No Skilled OT: At baseline function; Safe to return home  REQUIRES OT FOLLOW UP: No  Activity Tolerance  Activity Tolerance: Patient Tolerated treatment well  Safety Devices  Safety Devices in place: Yes  Type of devices: Call light within reach;Nurse notified(left up ad raven in room)            Restrictions  Position Activity Restriction  Other position/activity restrictions: up as tolerated, seizure precautions    Subjective   General  Chart Reviewed: Yes  Patient assessed for rehabilitation services?: Yes  Additional Pertinent Hx: 66 y.o. F admitted 5/3 with R side weakness and AMS. +hypoglycemia. PMHx includes carpal tunnel syndrome, HLD, HTN, DMII  Family / Caregiver Present: No  Referring Practitioner: Antic  Diagnosis: hypoglycemia  Subjective  Subjective: Pt sitting up in bed on entry. Feels back to her baseline aside from being tired.    Pain Assessment  Pain Level: 0  Social/Functional History  Social/Functional History  Lives With: Spouse(patient and spouse both work)  Type of Home: House  Home Layout: Two level, Bed/Bath upstairs, Laundry in basement  Home Access: Stairs to enter with rails  Entrance Stairs - Number of Steps: 2+5  Bathroom Shower/Tub: Tub/Shower unit  Bathroom Toilet: Standard  Bathroom Equipment: (none; may have shower chair)  Home Equipment: Rolling walker, Crutches  ADL Assistance: Independent  Homemaking Assistance: Independent  Homemaking Responsibilities: Yes  Ambulation Assistance: Independent  Transfer Assistance: Independent  Active : Yes  Occupation: Full time employment  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; 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  Additional Comments: patient denies history of falls at home     Objective  Treatment included: functional transfer training, ADL, pt education         Orientation  Overall Orientation Status: Within Functional Limits     Balance  Sitting Balance: Independent  Standing Balance: Independent  Standing Balance  Time: 5 min + 1 min   Activity: functional mobility in room, johnson, bathroom  Comment: Independent gait, transfers, stance  Toilet Transfers  Toilet - Technique: Ambulating  Equipment Used: Standard toilet  Toilet Transfer: Independent  ADL  Feeding: Independent  Grooming: Independent  LE Dressing: Independent  Toileting: Independent(simulated during toilet transfer)  Coordination  Movements Are Fluid And Coordinated: Yes        Transfers  Sit to stand: Independent  Stand to sit:  Independent     Cognition  Overall Cognitive Status: WNL       LUE Strength  Gross LUE Strength: WFL  RUE Strength  Gross RUE Strength: WFL  RUE Strength Comment: mild generalized weakness shoulder flex and elbow extension bilaterally             Plan  - D/C OT services       AM-PAC Score        AM-[facility] Inpatient Daily Activity Raw Score: 24  AM-PAC Inpatient ADL T-Scale Score : 57.54  ADL Inpatient CMS 0-100% Score: 0  ADL Inpatient CMS G-Code Modifier : CH         Therapy Time   Individual Concurrent Group Co-treatment   Time

## 2019-05-06 NOTE — PROGRESS NOTES
Physical Therapy/Occupational Therapy  No treatment  Attempted to see patient for PT/OT treatment. Patient leaving the floor for MRI. Will attempt evaluation at a later time/date as schedule allows.     Yuri PANDYA [address]

## 2019-05-07 VITALS
HEIGHT: 61 IN | OXYGEN SATURATION: 99 % | BODY MASS INDEX: 24.22 KG/M2 | RESPIRATION RATE: 16 BRPM | HEART RATE: 86 BPM | WEIGHT: 128.31 LBS | TEMPERATURE: 98 F | DIASTOLIC BLOOD PRESSURE: 75 MMHG | SYSTOLIC BLOOD PRESSURE: 119 MMHG

## 2019-05-07 LAB
ANION GAP SERPL CALCULATED.3IONS-SCNC: 10 MMOL/L (ref 3–16)
BACTERIA: ABNORMAL /HPF
BILIRUBIN URINE: NEGATIVE
BLOOD, URINE: NEGATIVE
BUN BLDV-MCNC: 22 MG/DL (ref 7–20)
CALCIUM SERPL-MCNC: 9.3 MG/DL (ref 8.3–10.6)
CHLORIDE BLD-SCNC: 106 MMOL/L (ref 99–110)
CLARITY: CLEAR
CO2: 24 MMOL/L (ref 21–32)
COLOR: YELLOW
CORTISOL 30 MIN: 22.4 UG/DL
CORTISOL 60 MIN: 22.4 UG/DL
CORTISOL BASE: 16.5 UG/DL
CREAT SERPL-MCNC: 1 MG/DL (ref 0.6–1.2)
EPITHELIAL CELLS, UA: ABNORMAL /HPF
GFR AFRICAN AMERICAN: >60
GFR NON-AFRICAN AMERICAN: 56
GLUCOSE BLD-MCNC: 144 MG/DL (ref 70–99)
GLUCOSE BLD-MCNC: 156 MG/DL (ref 70–99)
GLUCOSE BLD-MCNC: 70 MG/DL (ref 70–99)
GLUCOSE BLD-MCNC: 79 MG/DL (ref 70–99)
GLUCOSE URINE: >=1000 MG/DL
KETONES, URINE: NEGATIVE MG/DL
LEUKOCYTE ESTERASE, URINE: ABNORMAL
LV EF: 55 %
LVEF MODALITY: NORMAL
MICROSCOPIC EXAMINATION: YES
NITRITE, URINE: NEGATIVE
PERFORMED ON: ABNORMAL
PERFORMED ON: ABNORMAL
PERFORMED ON: NORMAL
PH UA: 6.5 (ref 5–8)
POTASSIUM REFLEX MAGNESIUM: 4.7 MMOL/L (ref 3.5–5.1)
PROTEIN UA: NEGATIVE MG/DL
RBC UA: ABNORMAL /HPF (ref 0–2)
SODIUM BLD-SCNC: 140 MMOL/L (ref 136–145)
SPECIFIC GRAVITY UA: 1.01 (ref 1–1.03)
URINE REFLEX TO CULTURE: YES
URINE TYPE: ABNORMAL
UROBILINOGEN, URINE: 0.2 E.U./DL
WBC UA: ABNORMAL /HPF (ref 0–5)

## 2019-05-07 PROCEDURE — 2580000003 HC RX 258: Performed by: STUDENT IN AN ORGANIZED HEALTH CARE EDUCATION/TRAINING PROGRAM

## 2019-05-07 PROCEDURE — 6360000002 HC RX W HCPCS: Performed by: STUDENT IN AN ORGANIZED HEALTH CARE EDUCATION/TRAINING PROGRAM

## 2019-05-07 PROCEDURE — 36415 COLL VENOUS BLD VENIPUNCTURE: CPT

## 2019-05-07 PROCEDURE — 80048 BASIC METABOLIC PNL TOTAL CA: CPT

## 2019-05-07 PROCEDURE — 93306 TTE W/DOPPLER COMPLETE: CPT

## 2019-05-07 PROCEDURE — 6360000002 HC RX W HCPCS: Performed by: INTERNAL MEDICINE

## 2019-05-07 PROCEDURE — 6370000000 HC RX 637 (ALT 250 FOR IP): Performed by: STUDENT IN AN ORGANIZED HEALTH CARE EDUCATION/TRAINING PROGRAM

## 2019-05-07 PROCEDURE — 80400 ACTH STIMULATION PANEL: CPT

## 2019-05-07 RX ADMIN — METFORMIN HYDROCHLORIDE 1000 MG: 500 TABLET ORAL at 17:44

## 2019-05-07 RX ADMIN — ENOXAPARIN SODIUM 40 MG: 40 INJECTION SUBCUTANEOUS at 10:03

## 2019-05-07 RX ADMIN — COSYNTROPIN 250 MCG: 0.25 INJECTION, POWDER, LYOPHILIZED, FOR SOLUTION INTRAMUSCULAR; INTRAVENOUS at 08:03

## 2019-05-07 RX ADMIN — ROSUVASTATIN CALCIUM 20 MG: 20 TABLET, COATED ORAL at 20:09

## 2019-05-07 RX ADMIN — METFORMIN HYDROCHLORIDE 1000 MG: 500 TABLET ORAL at 10:03

## 2019-05-07 RX ADMIN — ASPIRIN 81 MG 81 MG: 81 TABLET ORAL at 10:03

## 2019-05-07 RX ADMIN — SODIUM CHLORIDE: 9 INJECTION, SOLUTION INTRAVENOUS at 06:20

## 2019-05-07 RX ADMIN — ONDANSETRON 4 MG: 2 INJECTION INTRAMUSCULAR; INTRAVENOUS at 20:09

## 2019-05-07 RX ADMIN — Medication 10 ML: at 20:09

## 2019-05-07 ASSESSMENT — PAIN SCALES - GENERAL
PAINLEVEL_OUTOF10: 0

## 2019-05-07 NOTE — PROGRESS NOTES
Progress Note  PGY-1    Hospital Day: 5                                                         Code:Full Code  Admit Date: 5/3/2019                                 PCP: Bethany Weaver MD                     Daily Plan:  5/7/2019       CC: hypoglycemia     Subjective:   Was feeling somewhat lightheaded this morning after coming from the bathroom. BG's more stable, BP's still low but more stable. Pt has no complaint of chest pain, SOB. +emesis. MEDICATIONS:   Scheduled Meds:   [START ON 5/8/2019] empagliflozin  12.5 mg Oral Daily    metFORMIN  1,000 mg Oral BID WC    insulin lispro  0-6 Units Subcutaneous TID WC    insulin lispro  0-3 Units Subcutaneous Nightly    sodium chloride flush  10 mL Intravenous 2 times per day    enoxaparin  40 mg Subcutaneous Daily    aspirin  81 mg Oral Daily    rosuvastatin  20 mg Oral Nightly      Continuous Infusions:   sodium chloride 100 mL/hr at 05/07/19 0620    dextrose       PRN Meds:perflutren lipid microspheres, sodium chloride flush, magnesium hydroxide, ondansetron, glucose, dextrose, glucagon (rDNA), dextrose    Allergies: No Known Allergies    PHYSICAL EXAM:       Vitals: /68   Pulse 88   Temp 98 °F (36.7 °C) (Oral)   Resp 18   Ht 5' 1\" (1.549 m)   Wt 128 lb 4.9 oz (58.2 kg)   SpO2 97%   BMI 24.24 kg/m²     I/O:      Intake/Output Summary (Last 24 hours) at 5/7/2019 1400  Last data filed at 5/7/2019 1117  Gross per 24 hour   Intake 1884.99 ml   Output 1000 ml   Net 884.99 ml     I/O this shift:  In: 120 [P.O.:120]  Out: 300 [Urine:300]  I/O last 3 completed shifts: In: Jones Antonio [P.O.:550; I.V.:1285]  Out: 1000 [Urine:1000]      Physical Exam   Constitutional: She is oriented to person, place, and time. She appears well-developed and well-nourished. No distress. HENT:   Head: Normocephalic and atraumatic. Eyes: EOM are normal.   Neck: Normal range of motion. Neck supple. Cardiovascular: Normal rate and regular rhythm.    No murmur heard.  Pulmonary/Chest: Effort normal and breath sounds normal. She has no wheezes. She has no rales. Abdominal: Soft. Bowel sounds are normal. She exhibits no distension. Musculoskeletal: Normal range of motion. Neurological: She is alert and oriented to person, place, and time. Skin: Skin is warm and dry. Capillary refill takes less than 2 seconds. Psychiatric: She has a normal mood and affect. Her behavior is normal.       No results for input(s): PHART, AVC1ETV, PO2ART in the last 72 hours. DATA:       Labs  CBC:   Recent Labs     05/06/19  0608   WBC 7.4   HGB 10.6*   HCT 33.0*          BMP:   Recent Labs     05/05/19  0424 05/06/19  0807 05/07/19  0732    139 140   K 4.5 4.7 4.7    103 106   CO2 25 24 24   BUN 21* 23* 22*   CREATININE 1.2 1.0 1.0   GLUCOSE 91 98 79     LFT's:   No results for input(s): AST, ALT, ALB, BILITOT, ALKPHOS in the last 72 hours. Troponin:   No results for input(s): TROPONINI in the last 72 hours. BNP: No results for input(s): BNP in the last 72 hours. ABGs: No results for input(s): PHART, KKL9PQN, PO2ART in the last 72 hours. INR:   No results for input(s): INR in the last 72 hours. Lipids: No results for input(s): CHOL, HDL in the last 72 hours. Invalid input(s): LDLCALCU    U/A:  Recent Labs     05/06/19  2120   COLORU Yellow   PHUR 6.5   WBCUA 6-10*   RBCUA 0-2   BACTERIA 2+*   CLARITYU Clear   SPECGRAV 1.010   LEUKOCYTESUR SMALL*   UROBILINOGEN 0.2   BILIRUBINUR Negative   BLOODU Negative   GLUCOSEU >=1000*        ASSESSMENT AND PLAN:     Active Hospital Problems    Diagnosis Date Noted    Hypoglycemia [E16.2] 05/03/2019       Neil Perea, 66 y.o. female admitted for hypoglycemia and right sided weakness/numbness along with slurred speech     TIA vs. Acute metabolic encephalopathy 2/2 hypoglycemia, resolved   -Reported of stroke like symptoms, right sided weakness along with slurred speech, confusion.  Whipple's triad met: sx with glc <55, Resolved after treatment of hypoglycemia. Lasted less than 20 minutes. -CT head normal  -MRI brain normal    Hypoglycemia  -cosyntropin test normal  -accuchecks   -LDSSI  -metformin 1000 BID  -jardiance 12.5 mg OD    Hypotension, hx of HTN  -cosyntropin test normal    -IVF at 100 ml/hr   -holding home BP meds  -ECHO     HLD  -resumed home crestor      DVT Prophylaxis: lovenox 40  Diet: DIET CARB CONTROL; Carb Control: 3 carb choices (45 gms)/meal  Dietary Nutrition Supplements: Diabetic Oral Supplement  Code Status: Full Code    PT/OT Eval Status: consulted     Lord Artur MD    I will discuss the patient with the senior resident and Rianna Ramirez MD      -----------------------------  Michael Davis, PGY-1  [employer]   5/7/2019  2:00 PM    I have personally seen and evaluated this patient. I discussed findings and management with the resident, on 05/07/19  and agree as documented in this note     MRI brain without acute intracranial abnormalities. Blood pressures normal/borderline, orthostatics positive with increase in HR. No focal sensory or motor neurological deficits on exam, strength 5 x 5 in all extremities. CN grossly intact. She was on 40 mg lisinopril at home which we stopped on admission due to hypotension and stop on discharge. Cosyntropin test with Cortisol > 20 at 30 and 60 min, unlikely adrenal insufficiency causing her sx.  Free T4 normal. Echocardiogram was ordered and results pending   With BG 70s in am, will reduce dose of Jardiance to 12.5 mg OD  If echo reassuring will dc patient and follow up with PCP    Rianna Ramirez  Hospitalist  Attending Physician

## 2019-05-07 NOTE — PROGRESS NOTES
Met with pt briefly. Pt states that she does not have any questions or concerns at this time. Pt encouraged to eat her meals or at least a portion pf her meals to the best of her ability. Will continue to follow.

## 2019-05-07 NOTE — CARE COORDINATION
Case Management Daily Note:    Current Plan of Care: Possible DC home today vs tomorrow. PT AM-PAC: 24/24    Date: 5/6    OT AM-PAC: 24//24   Date: 66    DME needs: None       Discharge Plan: Home with spouse     Tentative Discharge Date: 5/7 vs 5/8    Current Barriers to Discharge: None     Resources/Information given: N/A      Case Management Notes: SW rounded on patient this date. Plan is for possible discharge home late today vs. Tomorrow. No needs noted for patient. Spouse will provide transport.          NALINI James, LSW     Rodger Florez Schwab   [phone]

## 2019-05-08 ENCOUNTER — TELEPHONE (OUTPATIENT)
Dept: FAMILY MEDICINE CLINIC | Age: 64
End: 2019-05-08

## 2019-05-08 LAB — ADRENOCORTICOTROPIC HORMONE: <5 PG/ML (ref 6–58)

## 2019-05-08 NOTE — TELEPHONE ENCOUNTER
[facility] Transitions Initial Follow Up Call    Outreach made within 2 business days of discharge: Yes    Patient: Sandra Chopra Patient : 1955   MRN: [id number]  Reason for Admission: There are no discharge diagnoses documented for the most recent discharge. Discharge Date: 19       Spoke with: patient    Discharge department/facility: [facility] Interactive Patient Contact:  Was patient able to fill all prescriptions: Yes  Was patient instructed to bring all medications to the follow-up visit: Yes  Is patient taking all medications as directed in the discharge summary? Yes  Does patient understand their discharge instructions: Yes  Does patient have questions or concerns that need addressed prior to 7-14 day follow up office visit: no    Scheduled appointment with PCP within 7-14 days - No available appt within 14 days. Pt does have visit scheduled below.     Follow Up  Future Appointments   Date Time Provider Josette Tate   2019  9:15 AM Juanis Ga MD [facility]       Sherryle Peal, MA

## 2019-05-08 NOTE — PROGRESS NOTES
Pt c/o nausea after shift change. Pt had discharge order placed. Notified Dr. Chun Preston. Stated pt can be discharged once less nauseated. Pt reported nausea was improved and ready to go home. IV removed. Reviewed all d/c instructions with pt. Pt had just eaten, so instructed to check BS before bedtime and take snack if less than 100. Prescription given, copy placed in chart. Pt verbalized understanding of all instructions. Waiting on  to . Addendum: Pt  picked up pt. Pt walked out to car with . Pt left 2 jardiance pills and left them in inpt pharmacy to pick. Called and spoke to pt and notified her they will be in pharmacy if she wants to get. Pt response, \"I think I have more at home\". Left with all other belongings.

## 2019-05-08 NOTE — DISCHARGE INSTR - COC
Continuity of Care Form    Patient Name: Carline Hale   :  1955  MRN:  [id number]    Admit date:  5/3/2019  Discharge date:  ***    Code Status Order: Full Code   Advance Directives:   Advance Care Flowsheet Documentation     Date/Time Healthcare Directive Type of Healthcare Directive Copy in [address] Agent's Name Healthcare Agent's Phone Number    19 1034  No, patient does not have an advance directive for healthcare treatment -- -- -- -- --          Admitting Physician:  No admitting provider for patient encounter.   PCP: Yoana Narvaez MD    Discharging Nurse: [facility] Unit/Room#: 7224/4492-02  Discharging Unit Phone Number: ***    Emergency Contact:   Extended Emergency Contact Information  Primary Emergency Contact: Alden Minor [address] Phone: [phone]  Relation: None  Secondary Emergency Contact: Lawertodd [place] [address] Phone: [phone]  Relation: None    Past Surgical History:  Past Surgical History:   Procedure Laterality Date    CARPAL TUNNEL RELEASE Bilateral 2006    COLONOSCOPY  2016    Dayan FAN    ENDOMETRIAL ABLATION  2006    EYE SURGERY  2007    cataract - left - approx date    TUBAL LIGATION         Immunization History:   Immunization History   Administered Date(s) Administered    Influenza Virus Vaccine 09/10/2012, 2013, 2014    Influenza Whole 10/18/2011    Influenza, Intradermal, Preservative free 10/26/2015    Influenza, Intradermal, Quadrivalent, Preservative Free 10/13/2016, 10/05/2017    Pneumococcal 13-valent Conjugate ([id number]) 10/26/2015    Pneumococcal Polysaccharide ([id number]) 10/05/2017    Tdap (Boostrix, Adacel) 12/10/2012       Active Problems:  Patient Active Problem List   Diagnosis Code    Essential hypertension I10    Diabetes mellitus, type II ([facility] [address] E11.9    Mixed hyperlipidemia E78.2    Iron deficiency anemia D50.9    Microalbuminuria R80.9    Thumb pain M79.646    Type 2 diabetes mellitus with peripheral neuropathy (HCC) E11.42    Uncontrolled type 2 diabetes mellitus with diabetic neuropathy (HCC) E11.40, E11.65    Hypoglycemia E16.2       Isolation/Infection:   Isolation          No Isolation            Nurse Assessment:  Last Vital Signs: /75   Pulse 86   Temp 98 °F (36.7 °C) (Oral)   Resp 16   Ht 5' 1\" (1.549 m)   Wt 128 lb 4.9 oz (58.2 kg)   SpO2 99%   BMI 24.24 kg/m²     Last documented pain score (0-10 scale): Pain Level: 0  Last Weight:   Wt Readings from Last 1 Encounters:   05/03/19 128 lb 4.9 oz (58.2 kg)     Mental Status:  {IP PT MENTAL STATUS:20030}    IV Access:  { [facility] IV ACCESS:245165926}    Nursing Mobility/ADLs:  Walking   {P DME BWFH:476380923}  Transfer  {P DME CWXI:926753475}  Bathing  {P DME CUFI:542907198}  Dressing  {P DME OVDQ:921951838}  Toileting  {P DME WAZO:765722428}  Feeding  {[facility] DME DBFP:738439251}  Med Admin  {[facility] DME BEIY:245888864}  Med Delivery   { [facility] MED Delivery:891486263}    Wound Care Documentation and Therapy:        Elimination:  Continence:   · Bowel: {YES / ZS:49831}  · Bladder: {YES / OD:56974}  Urinary Catheter: {Urinary Catheter:517042334}   Colostomy/Ileostomy/Ileal Conduit: {YES / CJ:76567}       Date of Last BM: ***    Intake/Output Summary (Last 24 hours) at 5/7/2019 2114  Last data filed at 5/7/2019 1418  Gross per 24 hour   Intake 2224.99 ml   Output 1000 ml   Net 1224.99 ml     I/O last 3 completed shifts: In: 0932 [P.O.:720;  I.V.:1985]  Out: 1000 [Urine:1000]    Safety Concerns:     [address] [facility] Safety Concerns:978311157}    Impairments/Disabilities:      { [facility] Impairments/Disabilities:694181436}    Nutrition Therapy:  Current Nutrition Therapy:   [address] Diet List:329886083}    Routes of Feeding: {CHP DME Other Feedings:788538084}  Liquids: {Slp liquid thickness:74348}  Daily Fluid Restriction: {CHP DME Yes amt

## 2019-05-14 DIAGNOSIS — E78.2 MIXED HYPERLIPIDEMIA: ICD-10-CM

## 2019-05-14 RX ORDER — ROSUVASTATIN CALCIUM 20 MG/1
TABLET, COATED ORAL
Qty: 90 TABLET | Refills: 1 | Status: SHIPPED | OUTPATIENT
Start: 2019-05-14 | End: 2020-03-03 | Stop reason: SDUPTHER

## 2019-05-14 NOTE — TELEPHONE ENCOUNTER
Medication:   Requested Prescriptions     Pending Prescriptions Disp Refills    rosuvastatin (CRESTOR) 20 MG tablet [Pharmacy Med Name: ROSUVASTATIN TABS 20MG] 90 tablet 0     Sig: TAKE 1 TABLET NIGHTLY       Last Filled:  2/14/2019    Patient Phone Number: [phone] (home)     Last appt: 3/12/2019   Next appt: 6/13/2019    Last Lipid:   Lab Results   Component Value Date    CHOL 278 04/30/2018    TRIG 122 04/30/2018    HDL 64 04/30/2018    HDL 63 02/14/2012    LDLCALC 190 04/30/2018

## 2019-05-15 DIAGNOSIS — E11.42 TYPE 2 DIABETES MELLITUS WITH PERIPHERAL NEUROPATHY (HCC): ICD-10-CM

## 2019-05-15 NOTE — TELEPHONE ENCOUNTER
Medication:   Requested Prescriptions     Pending Prescriptions Disp Refills    blood glucose test strips (ONE TOUCH ULTRA TEST) strip 200 each 3     Sig: As needed. Last Filled:      Patient Phone Number: [phone] (home)     Last appt: 3/12/2019   Next appt: 6/13/2019    Last Labs DM:   Lab Results   Component Value Date    LABA1C 8.3 05/03/2019       Last OARRS: No flowsheet data found.     Preferred Pharmacy:   [facility] [address] JOE [address] Shayna Omer [phone]  [address] [place] [address]  Phone: [phone] Fax: [phone] [address] [phone] Shayna Redder [phone]  [facility] [address]  Phone: [phone] Fax: [phone]

## 2019-05-24 ENCOUNTER — OFFICE VISIT (OUTPATIENT)
Dept: INTERNAL MEDICINE CLINIC | Age: 64
End: 2019-05-24
Payer: COMMERCIAL

## 2019-05-24 VITALS
OXYGEN SATURATION: 98 % | HEART RATE: 74 BPM | HEIGHT: 61 IN | DIASTOLIC BLOOD PRESSURE: 76 MMHG | SYSTOLIC BLOOD PRESSURE: 120 MMHG | BODY MASS INDEX: 23.03 KG/M2 | WEIGHT: 122 LBS

## 2019-05-24 DIAGNOSIS — M21.372 LEFT FOOT DROP: Primary | ICD-10-CM

## 2019-05-24 PROCEDURE — 99214 OFFICE O/P EST MOD 30 MIN: CPT | Performed by: INTERNAL MEDICINE

## 2019-05-24 RX ORDER — GABAPENTIN 100 MG/1
100 CAPSULE ORAL 3 TIMES DAILY
Qty: 90 CAPSULE | Refills: 0 | Status: SHIPPED | OUTPATIENT
Start: 2019-05-24 | End: 2020-03-03

## 2019-05-28 RX ORDER — SITAGLIPTIN 100 MG/1
TABLET, FILM COATED ORAL
Qty: 90 TABLET | Refills: 1 | Status: SHIPPED | OUTPATIENT
Start: 2019-05-28 | End: 2019-06-17 | Stop reason: ALTCHOICE

## 2019-05-28 NOTE — TELEPHONE ENCOUNTER
Medication:   Requested Prescriptions     Pending Prescriptions Disp Refills    JANUVIA 100 MG tablet [Pharmacy Med Name: Moreno Juan 100MG] 90 tablet 1     Sig: TAKE 1 TABLET DAILY       Last Filled:  2/26/2019    Patient Phone Number: [phone] (home)     Last appt: 5/24/2019   Next appt: 6/13/2019    Last Labs DM:   Lab Results   Component Value Date    LABA1C 8.3 05/03/2019       Last OARRS: No flowsheet data found.     Preferred Pharmacy:   [facility] [address]JOR[address] JOE [place] [address] [address] [facility] [phone]  [address] [place] [address]  Phone: [phone] Fax: [phone] [facility], [address] [facility] [phone] [facility] [phone]  [facility] [address]  Phone: [phone] Fax: [phone]

## 2019-05-31 ENCOUNTER — TELEPHONE (OUTPATIENT)
Dept: INTERNAL MEDICINE CLINIC | Age: 64
End: 2019-05-31

## 2019-05-31 DIAGNOSIS — M21.372 LEFT FOOT DROP: Primary | ICD-10-CM

## 2019-05-31 NOTE — TELEPHONE ENCOUNTER
Let her know that when she calls [address] [place] [address] [address] neurology, she can ask for any neurologist that has the earliest appointment. It does not have to be Dr Nury Bowers. Unfortunately we dont have control over this. Also I have placed another referral in the chart for Dr Mikayla Dawkins. She can call her as well, we can give her this info.

## 2019-05-31 NOTE — TELEPHONE ENCOUNTER
Pt saw doctor Eros Bethea on her last visit 5,24,19 and been referred to see a neurology , Anh Madden but can not seen till 6,30 19. Pt would like to be referred to another neurology how can get her sooner because the condition of her left foot is getting worse.    Pt phone is [phone]

## 2019-06-03 ENCOUNTER — TELEPHONE (OUTPATIENT)
Dept: INTERNAL MEDICINE CLINIC | Age: 64
End: 2019-06-03

## 2019-06-03 NOTE — TELEPHONE ENCOUNTER
Was told to get a foot brace please fax a script to [facility]  in [facility] was seen by Dr Eros Bethea on 5-24-19

## 2019-06-17 ENCOUNTER — OFFICE VISIT (OUTPATIENT)
Dept: INTERNAL MEDICINE CLINIC | Age: 64
End: 2019-06-17
Payer: COMMERCIAL

## 2019-06-17 VITALS
BODY MASS INDEX: 23.3 KG/M2 | HEART RATE: 78 BPM | DIASTOLIC BLOOD PRESSURE: 70 MMHG | HEIGHT: 61 IN | WEIGHT: 123.4 LBS | SYSTOLIC BLOOD PRESSURE: 128 MMHG

## 2019-06-17 DIAGNOSIS — E78.2 MIXED HYPERLIPIDEMIA: ICD-10-CM

## 2019-06-17 DIAGNOSIS — I10 ESSENTIAL HYPERTENSION: ICD-10-CM

## 2019-06-17 DIAGNOSIS — R41.3 MEMORY IMPAIRMENT: ICD-10-CM

## 2019-06-17 DIAGNOSIS — R63.0 POOR APPETITE: ICD-10-CM

## 2019-06-17 DIAGNOSIS — R63.4 WEIGHT LOSS: ICD-10-CM

## 2019-06-17 LAB
A/G RATIO: 1.6 (ref 1.1–2.2)
ALBUMIN SERPL-MCNC: 4.7 G/DL (ref 3.4–5)
ALP BLD-CCNC: 55 U/L (ref 40–129)
ALT SERPL-CCNC: 21 U/L (ref 10–40)
ANION GAP SERPL CALCULATED.3IONS-SCNC: 16 MMOL/L (ref 3–16)
AST SERPL-CCNC: 29 U/L (ref 15–37)
BASOPHILS ABSOLUTE: 0 K/UL (ref 0–0.2)
BASOPHILS RELATIVE PERCENT: 0.6 %
BILIRUB SERPL-MCNC: 0.3 MG/DL (ref 0–1)
BUN BLDV-MCNC: 15 MG/DL (ref 7–20)
CALCIUM SERPL-MCNC: 10 MG/DL (ref 8.3–10.6)
CHLORIDE BLD-SCNC: 103 MMOL/L (ref 99–110)
CHOLESTEROL, TOTAL: 129 MG/DL (ref 0–199)
CO2: 25 MMOL/L (ref 21–32)
CREAT SERPL-MCNC: 0.9 MG/DL (ref 0.6–1.2)
EOSINOPHILS ABSOLUTE: 0.2 K/UL (ref 0–0.6)
EOSINOPHILS RELATIVE PERCENT: 2.3 %
GFR AFRICAN AMERICAN: >60
GFR NON-AFRICAN AMERICAN: >60
GLOBULIN: 3 G/DL
GLUCOSE BLD-MCNC: 102 MG/DL (ref 70–99)
HBA1C MFR BLD: 7.9 %
HCT VFR BLD CALC: 34.3 % (ref 36–48)
HDLC SERPL-MCNC: 60 MG/DL (ref 40–60)
HEMOGLOBIN: 11.1 G/DL (ref 12–16)
LDL CHOLESTEROL CALCULATED: 49 MG/DL
LYMPHOCYTES ABSOLUTE: 3.9 K/UL (ref 1–5.1)
LYMPHOCYTES RELATIVE PERCENT: 48.4 %
MCH RBC QN AUTO: 26.6 PG (ref 26–34)
MCHC RBC AUTO-ENTMCNC: 32.4 G/DL (ref 31–36)
MCV RBC AUTO: 82 FL (ref 80–100)
MONOCYTES ABSOLUTE: 0.5 K/UL (ref 0–1.3)
MONOCYTES RELATIVE PERCENT: 5.8 %
NEUTROPHILS ABSOLUTE: 3.5 K/UL (ref 1.7–7.7)
NEUTROPHILS RELATIVE PERCENT: 42.9 %
PDW BLD-RTO: 13.9 % (ref 12.4–15.4)
PLATELET # BLD: 340 K/UL (ref 135–450)
PMV BLD AUTO: 7.2 FL (ref 5–10.5)
POTASSIUM SERPL-SCNC: 4.4 MMOL/L (ref 3.5–5.1)
RBC # BLD: 4.18 M/UL (ref 4–5.2)
SODIUM BLD-SCNC: 144 MMOL/L (ref 136–145)
TOTAL PROTEIN: 7.7 G/DL (ref 6.4–8.2)
TRIGL SERPL-MCNC: 100 MG/DL (ref 0–150)
VLDLC SERPL CALC-MCNC: 20 MG/DL
WBC # BLD: 8.1 K/UL (ref 4–11)

## 2019-06-17 PROCEDURE — 83036 HEMOGLOBIN GLYCOSYLATED A1C: CPT | Performed by: INTERNAL MEDICINE

## 2019-06-17 PROCEDURE — 99214 OFFICE O/P EST MOD 30 MIN: CPT | Performed by: INTERNAL MEDICINE

## 2019-06-17 ASSESSMENT — ENCOUNTER SYMPTOMS
SORE THROAT: 0
RHINORRHEA: 0
SHORTNESS OF BREATH: 0
SINUS PRESSURE: 0
CONSTIPATION: 0
NAUSEA: 0
VOMITING: 0
DIARRHEA: 0
COUGH: 0
BLOOD IN STOOL: 0
CHEST TIGHTNESS: 0
ABDOMINAL PAIN: 0
WHEEZING: 0

## 2019-06-17 NOTE — PROGRESS NOTES
Pari Fish   Date ofBirth:  1955    Date of Visit:  6/17/2019    Chief Complaint   Patient presents with    Hypertension    Diabetes    Hyperlipidemia       HPI  Diabetes Mellitus Type 2 with neuropathy:  Current Medications: Metformin 1000mg po bid and Jardiance 25mg 1/2 po q day for diabetes. Patient is taking Gabapentin 100mg po tid for neuropathy. Patient states Glimepiride and Januvia were discontinued during a hospitalization 5/3/19  Diet: Pt decreases carbohydrates. Pt states she doesn't have a big appetite. Home blood sugar records: patient tests fasting, afternoon, and bedtime. Pt states her fasting blood sugar averages , afternoon blood sugar averages 112-115, and bedtime averages . Any episodes of hypoglycemia? no  Eye exam current (within one year): yes on 3/18/19  Daily Aspirin? Yes  Current exercise:  Pt goes to [employer] 3 times per week does treadmill 2-3 miles and a 30 minutes circuit     Hypertension:    Current Medications: None Lisinopril was discontinued during hospital admit due to low blood pressure  Home blood pressure monitoring: No  Patient states she doesn't add salt. Hyperlipidemia:  Current medication: Rosuvastatin 20mg po qhs  Diet: patient decreases fat and cholesterol    Poor appetite:  Pt states she hasn't had an appetite for 2 months. Pt states she eats a late breakfast and bites of dinner. Pt doesn't eat lunch. Pt states she doesn't snack a lot anymore. Pt's  states pt eats kids meals when she eats out. Pt states she know she needs to eat but just doesn't feel hungry. Pt doesn't like nutritional shakes. Pt states she was given them in the hospital but doesn't like them. Weight loss:  Pt c/o weight loss. Pt states she noticed her pants were baggy a couple of months ago. Pt states she has been working nonstop and didn't notice the weight loss. Pt has lost almost 23 lbs since December 2018.  Pt denies abdominal pain, nausea, vomiting, tightness, shortness of breath and wheezing. Cardiovascular: Negative for chest pain, palpitations and leg swelling. Gastrointestinal: Negative for abdominal pain, blood in stool, constipation, diarrhea, nausea and vomiting. Genitourinary: Negative for dysuria, frequency and hematuria. Musculoskeletal: Negative for arthralgias and myalgias. Skin: Negative for rash and wound. Neurological: Positive for numbness (neuropathy). Negative for dizziness, tremors, syncope, weakness, light-headedness and headaches. Psychiatric/Behavioral: Negative for dysphoric mood and sleep disturbance. The patient is not nervous/anxious. No Known Allergies  Outpatient Medications Marked as Taking for the 6/17/19 encounter (Office Visit) with Mary Mendoza MD   Medication Sig Dispense Refill    metFORMIN (GLUCOPHAGE) 1000 MG tablet TAKE 1 TABLET TWICE A DAY WITH MEALS 180 tablet 1    gabapentin (NEURONTIN) 100 MG capsule Take 1 capsule by mouth 3 times daily for 30 days. Intended supply: 90 days 90 capsule 0    blood glucose test strips (ONE TOUCH ULTRA TEST) strip USE 1 STRIP TO CHECK GLUCOSE TWICE A DAY AND AS NEEDED 200 each 3    rosuvastatin (CRESTOR) 20 MG tablet TAKE 1 TABLET NIGHTLY 90 tablet 1    empagliflozin (JARDIANCE) 25 MG tablet Take 12.5 mg by mouth daily 30 tablet 3    aspirin 81 MG tablet Take 1 tablet by mouth daily 30 tablet 11    Multiple Minerals TABS Take  by mouth. Vitals:    06/17/19 1505   BP: 128/70   Site: Right Upper Arm   Position: Sitting   Cuff Size: Medium Adult   Pulse: 78   Weight: 123 lb 6.4 oz (56 kg)   Height: 5' 1\" (1.549 m)     Body mass index is 23.32 kg/m². Physical Exam   Constitutional: She is oriented to person, place, and time. She appears well-developed and well-nourished. No distress. HENT:   Mouth/Throat: Oropharynx is clear and moist and mucous membranes are normal.   Eyes: Pupils are equal, round, and reactive to light.  Conjunctivae, EOM and lids are normal.   Neck: Neck supple. Carotid bruit is not present. No thyromegaly present. Cardiovascular: Normal rate, regular rhythm, S1 normal, S2 normal and normal heart sounds. Exam reveals no gallop and no friction rub. No murmur heard. Pulmonary/Chest: Effort normal and breath sounds normal. No respiratory distress. She has no wheezes. She has no rhonchi. She has no rales. Abdominal: Soft. Normal appearance and bowel sounds are normal. She exhibits no distension. There is no hepatosplenomegaly. There is no tenderness. Musculoskeletal: She exhibits no edema. Lymphadenopathy:        Head (right side): No submandibular adenopathy present. Head (left side): No submandibular adenopathy present. Neurological: She is alert and oriented to person, place, and time. Psychiatric: She has a normal mood and affect. Nursing note reviewed.         Results for POC orders placed in visit on 06/17/19   POCT glycosylated hemoglobin (Hb A1C)   Result Value Ref Range    Hemoglobin A1C 7.9 %     Lab Review   Orders Only on 06/17/2019   Component Date Value    Sodium 06/17/2019 144     Potassium 06/17/2019 4.4     Chloride 06/17/2019 103     CO2 06/17/2019 25     Anion Gap 06/17/2019 16     Glucose 06/17/2019 102*    BUN 06/17/2019 15     CREATININE 06/17/2019 0.9     GFR Non- 06/17/2019 >60     GFR  06/17/2019 >60     Calcium 06/17/2019 10.0     Total Protein 06/17/2019 7.7     Alb 06/17/2019 4.7     Albumin/Globulin Ratio 06/17/2019 1.6     Total Bilirubin 06/17/2019 0.3     Alkaline Phosphatase 06/17/2019 55     ALT 06/17/2019 21     AST 06/17/2019 29     Globulin 06/17/2019 3.0     Cholesterol, Total 06/17/2019 129     Triglycerides 06/17/2019 100     HDL 06/17/2019 60     LDL Calculated 06/17/2019 49     VLDL Cholesterol Calcula* 06/17/2019 20    Admission on 05/03/2019, Discharged on 05/07/2019   No results displayed because visit has over 200 results. Office Visit on 03/12/2019   Component Date Value    Hemoglobin A1C 03/12/2019 8.8        MMSE exam score 27 out of 30    Assessment/Plan     1. Uncontrolled type 2 diabetes mellitus with diabetic neuropathy (HCC)  -Hemoglobin A1c of 7.9% shows diabetes control is improving  -Continue same medications  -Limit carbohydrates to 45 grams with meals and 15 grams with snacks  -monitor blood sugars  -Regular aerobic exercise  - POCT glycosylated hemoglobin (Hb A1C)  - MICROALBUMIN / CREATININE URINE RATIO; Future    2. Essential hypertension  -stable off medication  -Low sodium diet  -Regular aerobic exercise    3. Mixed hyperlipidemia  -Patient had fasting lipid panel done this morning  -Continue same medications  -Low fat, low cholesterol diet  -Regular aerobic exercise    4. Weight loss  - CBC Auto Differential; Future  - TSH without Reflex; Future  - Referral to Hafsa Wilhelm MD, Gastroenterology, [facility]    5. Poor appetite  - CBC Auto Differential; Future  - Referral to Hafsa Wilhelm MD, Gastroenterology, [facility]    6. Memory impairment  - CBC Auto Differential; Future  - TSH without Reflex; Future  - Vitamin B12; Future  - Referral to Cassie Sandoval MD, Neurology, [facility]      Discussed medications with patient, who voiced understanding of their use and indications. All questions answered. Return in about 1 month (around 7/15/2019) for weight loss and poor appetite.

## 2019-06-17 NOTE — PATIENT INSTRUCTIONS
1. Uncontrolled type 2 diabetes mellitus with diabetic neuropathy (HCC)  -Hemoglobin A1c of 7.9% shows diabetes control is improving  -Continue same medications  -Limit carbohydrates to 45 grams with meals and 15 grams with snacks  -monitor blood sugars  -Regular aerobic exercise  - POCT glycosylated hemoglobin (Hb A1C)  - MICROALBUMIN / CREATININE URINE RATIO; Future    2. Essential hypertension  -stable off medication  -Low sodium diet  -Regular aerobic exercise    3. Mixed hyperlipidemia  -Patient had fasting lipid panel done this morning  -Continue same medications  -Low fat, low cholesterol diet  -Regular aerobic exercise    4. Weight loss  - CBC Auto Differential; Future  - TSH without Reflex; Future  - Referral to Hafsa Wilhelm MD, Gastroenterology, [facility]    5. Poor appetite  - CBC Auto Differential; Future  - Referral to Hafsa Wilhelm MD, Gastroenterology, [facility]    6. Memory impairment  - CBC Auto Differential; Future  - TSH without Reflex;  Future  - Vitamin B12; Future  - Referral to ALEENA Singh MD, Neurology, [facility]

## 2019-06-18 LAB
TSH SERPL DL<=0.05 MIU/L-ACNC: 2.31 UIU/ML (ref 0.27–4.2)
VITAMIN B-12: 791 PG/ML (ref 211–911)

## 2019-07-19 ENCOUNTER — OFFICE VISIT (OUTPATIENT)
Dept: INTERNAL MEDICINE CLINIC | Age: 64
End: 2019-07-19
Payer: COMMERCIAL

## 2019-07-19 VITALS
HEART RATE: 60 BPM | SYSTOLIC BLOOD PRESSURE: 122 MMHG | HEIGHT: 61 IN | WEIGHT: 116.8 LBS | DIASTOLIC BLOOD PRESSURE: 60 MMHG | BODY MASS INDEX: 22.05 KG/M2 | OXYGEN SATURATION: 99 %

## 2019-07-19 DIAGNOSIS — R63.4 WEIGHT LOSS: Primary | ICD-10-CM

## 2019-07-19 DIAGNOSIS — R63.0 POOR APPETITE: ICD-10-CM

## 2019-07-19 DIAGNOSIS — D64.9 ANEMIA, UNSPECIFIED TYPE: ICD-10-CM

## 2019-07-19 LAB
FERRITIN: 169 NG/ML (ref 15–150)
HCT VFR BLD CALC: 34.6 % (ref 36–48)
HEMOGLOBIN: 11.2 G/DL (ref 12–16)
IRON SATURATION: 23 % (ref 15–50)
IRON: 90 UG/DL (ref 37–145)
MCH RBC QN AUTO: 27.3 PG (ref 26–34)
MCHC RBC AUTO-ENTMCNC: 32.5 G/DL (ref 31–36)
MCV RBC AUTO: 84.1 FL (ref 80–100)
PDW BLD-RTO: 15.1 % (ref 12.4–15.4)
PLATELET # BLD: 344 K/UL (ref 135–450)
PMV BLD AUTO: 7.1 FL (ref 5–10.5)
RBC # BLD: 4.11 M/UL (ref 4–5.2)
TOTAL IRON BINDING CAPACITY: 392 UG/DL (ref 260–445)
WBC # BLD: 5.6 K/UL (ref 4–11)

## 2019-07-19 PROCEDURE — 99213 OFFICE O/P EST LOW 20 MIN: CPT | Performed by: INTERNAL MEDICINE

## 2019-07-26 ASSESSMENT — ENCOUNTER SYMPTOMS
VOMITING: 0
NAUSEA: 0
CONSTIPATION: 0
ABDOMINAL PAIN: 0
DIARRHEA: 0
SHORTNESS OF BREATH: 0
COUGH: 0
SORE THROAT: 0

## 2019-10-16 ENCOUNTER — OFFICE VISIT (OUTPATIENT)
Dept: PRIMARY CARE CLINIC | Age: 64
End: 2019-10-16
Payer: COMMERCIAL

## 2019-10-16 VITALS
BODY MASS INDEX: 22.51 KG/M2 | WEIGHT: 119.2 LBS | HEIGHT: 61 IN | SYSTOLIC BLOOD PRESSURE: 120 MMHG | DIASTOLIC BLOOD PRESSURE: 80 MMHG | OXYGEN SATURATION: 99 %

## 2019-10-16 DIAGNOSIS — R79.89 ELEVATED FERRITIN: ICD-10-CM

## 2019-10-16 DIAGNOSIS — I10 ESSENTIAL HYPERTENSION: ICD-10-CM

## 2019-10-16 DIAGNOSIS — E11.42 TYPE 2 DIABETES MELLITUS WITH PERIPHERAL NEUROPATHY (HCC): ICD-10-CM

## 2019-10-16 DIAGNOSIS — R63.4 WEIGHT LOSS: ICD-10-CM

## 2019-10-16 DIAGNOSIS — E78.2 MIXED HYPERLIPIDEMIA: ICD-10-CM

## 2019-10-16 DIAGNOSIS — R63.0 POOR APPETITE: ICD-10-CM

## 2019-10-16 DIAGNOSIS — R63.4 WEIGHT LOSS: Primary | ICD-10-CM

## 2019-10-16 LAB
ANION GAP SERPL CALCULATED.3IONS-SCNC: 20 MMOL/L (ref 3–16)
BASOPHILS ABSOLUTE: 0 K/UL (ref 0–0.2)
BASOPHILS RELATIVE PERCENT: 0.6 %
BUN BLDV-MCNC: 13 MG/DL (ref 7–20)
CALCIUM SERPL-MCNC: 10.1 MG/DL (ref 8.3–10.6)
CHLORIDE BLD-SCNC: 95 MMOL/L (ref 99–110)
CO2: 23 MMOL/L (ref 21–32)
CREAT SERPL-MCNC: 1.1 MG/DL (ref 0.6–1.2)
CREATININE URINE: 110.8 MG/DL (ref 28–259)
EOSINOPHILS ABSOLUTE: 0.1 K/UL (ref 0–0.6)
EOSINOPHILS RELATIVE PERCENT: 1 %
FERRITIN: 85.3 NG/ML (ref 15–150)
GFR AFRICAN AMERICAN: >60
GFR NON-AFRICAN AMERICAN: 50
GLUCOSE BLD-MCNC: 94 MG/DL (ref 70–99)
HBA1C MFR BLD: 7.8 %
HCT VFR BLD CALC: 34.9 % (ref 36–48)
HEMOGLOBIN: 11.4 G/DL (ref 12–16)
LYMPHOCYTES ABSOLUTE: 1.3 K/UL (ref 1–5.1)
LYMPHOCYTES RELATIVE PERCENT: 16.3 %
MCH RBC QN AUTO: 27.2 PG (ref 26–34)
MCHC RBC AUTO-ENTMCNC: 32.8 G/DL (ref 31–36)
MCV RBC AUTO: 83.1 FL (ref 80–100)
MICROALBUMIN UR-MCNC: 7.1 MG/DL
MICROALBUMIN/CREAT UR-RTO: 64.1 MG/G (ref 0–30)
MONOCYTES ABSOLUTE: 0.6 K/UL (ref 0–1.3)
MONOCYTES RELATIVE PERCENT: 7.9 %
NEUTROPHILS ABSOLUTE: 5.8 K/UL (ref 1.7–7.7)
NEUTROPHILS RELATIVE PERCENT: 74.2 %
PDW BLD-RTO: 13.8 % (ref 12.4–15.4)
PLATELET # BLD: 311 K/UL (ref 135–450)
PMV BLD AUTO: 7.2 FL (ref 5–10.5)
POTASSIUM SERPL-SCNC: 4.1 MMOL/L (ref 3.5–5.1)
RBC # BLD: 4.2 M/UL (ref 4–5.2)
SODIUM BLD-SCNC: 138 MMOL/L (ref 136–145)
WBC # BLD: 7.9 K/UL (ref 4–11)

## 2019-10-16 PROCEDURE — 83036 HEMOGLOBIN GLYCOSYLATED A1C: CPT | Performed by: INTERNAL MEDICINE

## 2019-10-16 PROCEDURE — 99214 OFFICE O/P EST MOD 30 MIN: CPT | Performed by: INTERNAL MEDICINE

## 2019-10-23 ASSESSMENT — ENCOUNTER SYMPTOMS
SORE THROAT: 0
COUGH: 0
CONSTIPATION: 0
WHEEZING: 0
ABDOMINAL PAIN: 0
SHORTNESS OF BREATH: 0
SINUS PRESSURE: 0
VOMITING: 0
NAUSEA: 0
CHEST TIGHTNESS: 0
RHINORRHEA: 0
BLOOD IN STOOL: 0
DIARRHEA: 0

## 2019-10-24 PROBLEM — R63.4 WEIGHT LOSS: Status: ACTIVE | Noted: 2019-10-24

## 2019-10-24 PROBLEM — R63.0 POOR APPETITE: Status: ACTIVE | Noted: 2019-10-24

## 2019-12-03 ENCOUNTER — OFFICE VISIT (OUTPATIENT)
Dept: PRIMARY CARE CLINIC | Age: 64
End: 2019-12-03
Payer: COMMERCIAL

## 2019-12-03 VITALS
HEART RATE: 82 BPM | OXYGEN SATURATION: 100 % | WEIGHT: 123 LBS | SYSTOLIC BLOOD PRESSURE: 120 MMHG | BODY MASS INDEX: 23.24 KG/M2 | DIASTOLIC BLOOD PRESSURE: 70 MMHG

## 2019-12-03 DIAGNOSIS — R80.9 MICROALBUMINURIA: ICD-10-CM

## 2019-12-03 DIAGNOSIS — Z23 NEED FOR INFLUENZA VACCINATION: ICD-10-CM

## 2019-12-03 DIAGNOSIS — R63.4 WEIGHT LOSS: ICD-10-CM

## 2019-12-03 DIAGNOSIS — E11.42 TYPE 2 DIABETES MELLITUS WITH PERIPHERAL NEUROPATHY (HCC): Primary | ICD-10-CM

## 2019-12-03 PROCEDURE — 90471 IMMUNIZATION ADMIN: CPT | Performed by: INTERNAL MEDICINE

## 2019-12-03 PROCEDURE — 90686 IIV4 VACC NO PRSV 0.5 ML IM: CPT | Performed by: INTERNAL MEDICINE

## 2019-12-03 PROCEDURE — 99213 OFFICE O/P EST LOW 20 MIN: CPT | Performed by: INTERNAL MEDICINE

## 2019-12-03 RX ORDER — LISINOPRIL 5 MG/1
5 TABLET ORAL DAILY
Qty: 30 TABLET | Refills: 3 | Status: SHIPPED | OUTPATIENT
Start: 2019-12-03 | End: 2020-03-03 | Stop reason: SDUPTHER

## 2019-12-11 ASSESSMENT — ENCOUNTER SYMPTOMS
RHINORRHEA: 0
CHEST TIGHTNESS: 0
SORE THROAT: 0
ABDOMINAL PAIN: 0
SHORTNESS OF BREATH: 0
NAUSEA: 0
COUGH: 0
CONSTIPATION: 0
VOMITING: 0
DIARRHEA: 0

## 2020-01-13 NOTE — TELEPHONE ENCOUNTER
Patient requesting a medication refill.   Medication metFORMIN (GLUCOPHAGE) 1000 MG tablet  Pharmacy [facility]  Last office visit: 12/3  Next office visit: 3/3/2020

## 2020-01-13 NOTE — TELEPHONE ENCOUNTER
Medication:   Requested Prescriptions     Pending Prescriptions Disp Refills    metFORMIN (GLUCOPHAGE) 1000 MG tablet 180 tablet 1     Sig: TAKE 1 TABLET TWICE A DAY WITH MEALS       Last Filled:      Patient Phone Number: [phone] (home)     Last appt: 12/3/2019   Next appt: 3/3/2020    Last Labs DM:   Lab Results   Component Value Date    LABA1C 7.8 10/16/2019       Last OARRS: No flowsheet data found.     Preferred Pharmacy:   [facility] [address]IE JOE [address]  Phone: [phone] Fax: [phone]  [facility], [address] [place] [phone] - F [phone]  [place] [address]  Phone: [phone] Fax: [phone]

## 2020-01-13 NOTE — TELEPHONE ENCOUNTER
Medication:   Requested Prescriptions     Pending Prescriptions Disp Refills    metFORMIN (GLUCOPHAGE) 1000 MG tablet 180 tablet 1     Sig: TAKE 1 TABLET TWICE A DAY WITH MEALS       Last Filled:      Patient Phone Number: [phone] (home)     Last appt: 12/3/2019   Next appt: 3/3/2020    Last Labs DM:   Lab Results   Component Value Date    LABA1C 7.8 10/16/2019       Last OARRS: No flowsheet data found.     Preferred Pharmacy:   [address]  Phone: [phone] Fax: [phone]  [facility] [address], [address] [place] [address] [phone] - f [phone]  [facility] [address]  Phone: [phone] Fax: [phone]

## 2020-02-13 ENCOUNTER — TELEPHONE (OUTPATIENT)
Dept: PRIMARY CARE CLINIC | Age: 65
End: 2020-02-13

## 2020-03-03 ENCOUNTER — OFFICE VISIT (OUTPATIENT)
Dept: PRIMARY CARE CLINIC | Age: 65
End: 2020-03-03
Payer: COMMERCIAL

## 2020-03-03 ENCOUNTER — HOSPITAL ENCOUNTER (OUTPATIENT)
Age: 65
Discharge: HOME | End: 2020-03-03
Payer: COMMERCIAL

## 2020-03-03 ENCOUNTER — HOSPITAL ENCOUNTER (OUTPATIENT)
Dept: GENERAL RADIOLOGY | Age: 65
Discharge: HOME | End: 2020-03-03
Payer: COMMERCIAL

## 2020-03-03 VITALS
OXYGEN SATURATION: 99 % | BODY MASS INDEX: 23.26 KG/M2 | DIASTOLIC BLOOD PRESSURE: 60 MMHG | WEIGHT: 123.2 LBS | HEART RATE: 100 BPM | HEIGHT: 61 IN | SYSTOLIC BLOOD PRESSURE: 100 MMHG

## 2020-03-03 PROBLEM — M25.552 LEFT HIP PAIN: Status: ACTIVE | Noted: 2020-03-03

## 2020-03-03 LAB — HBA1C MFR BLD: 7.3 %

## 2020-03-03 PROCEDURE — 3051F HG A1C>EQUAL 7.0%<8.0%: CPT | Performed by: INTERNAL MEDICINE

## 2020-03-03 PROCEDURE — 83036 HEMOGLOBIN GLYCOSYLATED A1C: CPT | Performed by: INTERNAL MEDICINE

## 2020-03-03 PROCEDURE — 73502 X-RAY EXAM HIP UNI 2-3 VIEWS: CPT

## 2020-03-03 PROCEDURE — 99214 OFFICE O/P EST MOD 30 MIN: CPT | Performed by: INTERNAL MEDICINE

## 2020-03-03 RX ORDER — MELOXICAM 7.5 MG/1
7.5 TABLET ORAL DAILY
Qty: 30 TABLET | Refills: 0 | Status: SHIPPED | OUTPATIENT
Start: 2020-03-03 | End: 2020-03-18

## 2020-03-03 RX ORDER — ROSUVASTATIN CALCIUM 20 MG/1
20 TABLET, COATED ORAL DAILY
Qty: 90 TABLET | Refills: 1 | Status: SHIPPED | OUTPATIENT
Start: 2020-03-03 | End: 2020-08-03

## 2020-03-03 RX ORDER — LISINOPRIL 5 MG/1
5 TABLET ORAL DAILY
Qty: 90 TABLET | Refills: 1 | Status: SHIPPED | OUTPATIENT
Start: 2020-03-03 | End: 2020-08-03

## 2020-03-03 ASSESSMENT — ENCOUNTER SYMPTOMS
DIARRHEA: 0
RHINORRHEA: 0
VOMITING: 0
SHORTNESS OF BREATH: 0
SINUS PRESSURE: 0
CONSTIPATION: 0
ABDOMINAL PAIN: 0
NAUSEA: 0
SORE THROAT: 0
COUGH: 0
BLOOD IN STOOL: 0
WHEEZING: 0
CHEST TIGHTNESS: 0

## 2020-03-03 ASSESSMENT — PATIENT HEALTH QUESTIONNAIRE - PHQ9
2. FEELING DOWN, DEPRESSED OR HOPELESS: 0
1. LITTLE INTEREST OR PLEASURE IN DOING THINGS: 0
SUM OF ALL RESPONSES TO PHQ QUESTIONS 1-9: 0
SUM OF ALL RESPONSES TO PHQ QUESTIONS 1-9: 0
SUM OF ALL RESPONSES TO PHQ9 QUESTIONS 1 & 2: 0

## 2020-03-03 NOTE — PROGRESS NOTES
Antwon Jenkins   Date ofBirth:  1955    Date of Visit:  3/3/2020    Chief Complaint   Patient presents with    Diabetes    Hyperlipidemia    Hypertension    Weight Loss    Hip Pain       HPI  Diabetes Mellitus Type 2 with neuropathy:  Current Medications: Metformin 1000mg po bid and Januvia 100mg po q day  Diet: Pt decreases carbohydrates  Home blood sugar records: patient tests 2 times per day fasting and bedtime. Fasting blood sugar averages   and bedtime averages   Any episodes of hypoglycemia? no  Eye exam current (within one year): yes on 3/18/19  Daily Aspirin? Yes 81mg po q day  Current exercise: walks on treadmill for 45 minutes 2 times per week     Hypertension:    Current Medications: Lisinopril 5mg po q day  Home blood pressure monitoring: No  Diet: low salt     Hyperlipidemia:  Current medication: Rosuvastatin 20mg po qhs  Patient denies side effects  Diet: low fat, low cholesterol    Weight loss:  Pt states she still doesn't have an appetite. Pt states she tries to drink Boost 1 shake 3-4 times per week  Pt states she has gained some weight    Hip pain:  Pt c/o left hip pain x 2 months. Pt states hip pain is dull achy and intermittent. Pt denies injury. Pt takes Tylenol. Sitting makes pain worse. Pt has difficulty walking later in the day. Pt states she has to put a pillow on her seat in her car with driving. Pt states she was told her left leg is shorter when she was fitted for a brace for left foot drop. Wt Readings from Last 3 Encounters:   03/03/20 123 lb 3.2 oz (55.9 kg)   12/03/19 123 lb (55.8 kg)   10/16/19 119 lb 3.2 oz (54.1 kg)          Review of Systems   Constitutional: Positive for appetite change and unexpected weight change (stable). Negative for chills, fatigue and fever. HENT: Negative for congestion, postnasal drip, rhinorrhea, sinus pressure and sore throat. Eyes: Negative for visual disturbance.    Respiratory: Negative for cough, chest tightness, shortness of breath and wheezing. Cardiovascular: Negative for chest pain, palpitations and leg swelling. Gastrointestinal: Negative for abdominal pain, blood in stool, constipation, diarrhea, nausea and vomiting. Genitourinary: Negative for dysuria, frequency and hematuria. Musculoskeletal: Positive for arthralgias and gait problem. Negative for myalgias. Skin: Negative for rash and wound. Neurological: Negative for dizziness, tremors, syncope, weakness, light-headedness, numbness and headaches. Psychiatric/Behavioral: Negative for dysphoric mood and sleep disturbance. The patient is not nervous/anxious. No Known Allergies  Outpatient Medications Marked as Taking for the 3/3/20 encounter (Office Visit) with Kehinde Call MD   Medication Sig Dispense Refill    metFORMIN (GLUCOPHAGE) 1000 MG tablet TAKE 1 TABLET TWICE A DAY WITH MEALS 180 tablet 1    lisinopril (PRINIVIL;ZESTRIL) 5 MG tablet Take 1 tablet by mouth daily 30 tablet 3    SITagliptin (JANUVIA) 100 MG tablet Take 1 tablet by mouth daily 30 tablet 2    aspirin 81 MG tablet Take 1 tablet by mouth daily 30 tablet 11    blood glucose test strips (ONE TOUCH ULTRA TEST) strip USE 1 STRIP TO CHECK GLUCOSE TWICE A DAY AND AS NEEDED 200 each 3    rosuvastatin (CRESTOR) 20 MG tablet TAKE 1 TABLET NIGHTLY 90 tablet 1    Multiple Minerals TABS Take  by mouth. Vitals:    03/03/20 1505   BP: 100/60   Site: Right Upper Arm   Position: Sitting   Cuff Size: Medium Adult   Pulse: 100   SpO2: 99%   Weight: 123 lb 3.2 oz (55.9 kg)   Height: 5' 1\" (1.549 m)     Body mass index is 23.28 kg/m². Physical Exam  Nursing note reviewed. Constitutional:       General: She is not in acute distress. Appearance: Normal appearance. She is well-developed. HENT:      Mouth/Throat:      Pharynx: Oropharynx is clear. Eyes:      General: Lids are normal.      Extraocular Movements: Extraocular movements intact.       Conjunctiva/sclera: 10/16/2019 74.2     Lymphocytes % 10/16/2019 16.3     Monocytes % 10/16/2019 7.9     Eosinophils % 10/16/2019 1.0     Basophils % 10/16/2019 0.6     Neutrophils Absolute 10/16/2019 5.8     Lymphocytes Absolute 10/16/2019 1.3     Monocytes Absolute 10/16/2019 0.6     Eosinophils Absolute 10/16/2019 0.1     Basophils Absolute 10/16/2019 0.0    Office Visit on 10/16/2019   Component Date Value    Hemoglobin A1C 10/16/2019 7.8     Microalbumin, Random Uri* 10/16/2019 7.10*    Creatinine, Ur 10/16/2019 110.8     Microalbumin Creatinine * 10/16/2019 64.1*         Assessment/Plan     1. Type 2 diabetes mellitus with peripheral neuropathy (HCC)  - Hemoglobin A1c of 7.3% shows diabetes control is near goal  -Continue SITagliptin (JANUVIA) 100 MG tablet; Take 1 tablet by mouth daily  Dispense: 90 tablet; Refill: 1  -Continue Metformin 1000mg 2 times daily  -Limit carbohydrates to 45 grams with meals and 15 grams with snacks  -monitor blood sugars  -Regular aerobic exercise  - POCT glycosylated hemoglobin (Hb A1C)  - Have diabetic eye exam done  - Patient declines foot exam today  - Comprehensive Metabolic Panel; Future    2. Essential hypertension  - stable  - Continue lisinopril (PRINIVIL;ZESTRIL) 5 MG tablet; Take 1 tablet by mouth daily  Dispense: 90 tablet; Refill: 1  -Low sodium diet  -Regular aerobic exercise  - Comprehensive Metabolic Panel; Future    3. Mixed hyperlipidemia  - Continue rosuvastatin (CRESTOR) 20 MG tablet; Take 1 tablet by mouth daily  Dispense: 90 tablet; Refill: 1  -Low fat, low cholesterol diet  -Regular aerobic exercise  - Comprehensive Metabolic Panel; Future  - Lipid Panel; Future    4. Weight loss  -stable  -Continue Boost    5. Left hip pain  - XR HIP LEFT (2-3 VIEWS); Future  - Referral to [facility], Aviva Bansal MD, Orthopedic Surgery, [facility]  - meloxicam (MOBIC) 7.5 MG tablet; Take 1 tablet by mouth daily  Dispense: 30 tablet;  Refill: 0      Discussed medications with

## 2020-03-03 NOTE — PROGRESS NOTES
PHQ Scores 3/3/2020 3/12/2019 12/4/2018 9/12/2017 5/10/2016   PHQ2 Score 0 0 0 0 0   PHQ9 Score 0 0 0 0 0     Interpretation of Total Score Depression Severity: 1-4 = Minimal depression, 5-9 = Mild depression, 10-14 = Moderate depression, 15-19 = Moderately severe depression, 20-27 = Severe depression

## 2020-03-05 DIAGNOSIS — E11.42 TYPE 2 DIABETES MELLITUS WITH PERIPHERAL NEUROPATHY (HCC): ICD-10-CM

## 2020-03-05 DIAGNOSIS — I10 ESSENTIAL HYPERTENSION: ICD-10-CM

## 2020-03-05 DIAGNOSIS — E78.2 MIXED HYPERLIPIDEMIA: ICD-10-CM

## 2020-03-05 LAB
A/G RATIO: 1.6 (ref 1.1–2.2)
ALBUMIN SERPL-MCNC: 4.5 G/DL (ref 3.4–5)
ALP BLD-CCNC: 59 U/L (ref 40–129)
ALT SERPL-CCNC: 16 U/L (ref 10–40)
ANION GAP SERPL CALCULATED.3IONS-SCNC: 15 MMOL/L (ref 3–16)
AST SERPL-CCNC: 27 U/L (ref 15–37)
BILIRUB SERPL-MCNC: 0.4 MG/DL (ref 0–1)
BUN BLDV-MCNC: 14 MG/DL (ref 7–20)
CALCIUM SERPL-MCNC: 9.8 MG/DL (ref 8.3–10.6)
CHLORIDE BLD-SCNC: 100 MMOL/L (ref 99–110)
CHOLESTEROL, TOTAL: 147 MG/DL (ref 0–199)
CO2: 26 MMOL/L (ref 21–32)
CREAT SERPL-MCNC: 1 MG/DL (ref 0.6–1.2)
GFR AFRICAN AMERICAN: >60
GFR NON-AFRICAN AMERICAN: 56
GLOBULIN: 2.9 G/DL
GLUCOSE BLD-MCNC: 96 MG/DL (ref 70–99)
HDLC SERPL-MCNC: 70 MG/DL (ref 40–60)
LDL CHOLESTEROL CALCULATED: 65 MG/DL
POTASSIUM SERPL-SCNC: 4.1 MMOL/L (ref 3.5–5.1)
SODIUM BLD-SCNC: 141 MMOL/L (ref 136–145)
TOTAL PROTEIN: 7.4 G/DL (ref 6.4–8.2)
TRIGL SERPL-MCNC: 62 MG/DL (ref 0–150)
VLDLC SERPL CALC-MCNC: 12 MG/DL

## 2020-03-10 ENCOUNTER — OFFICE VISIT (OUTPATIENT)
Dept: ORTHOPEDIC SURGERY | Age: 65
End: 2020-03-10
Payer: COMMERCIAL

## 2020-03-10 VITALS
WEIGHT: 115 LBS | HEART RATE: 85 BPM | HEIGHT: 61 IN | BODY MASS INDEX: 21.71 KG/M2 | SYSTOLIC BLOOD PRESSURE: 116 MMHG | DIASTOLIC BLOOD PRESSURE: 73 MMHG

## 2020-03-10 PROCEDURE — 99204 OFFICE O/P NEW MOD 45 MIN: CPT | Performed by: ORTHOPAEDIC SURGERY

## 2020-03-11 RX ORDER — GABAPENTIN 100 MG/1
100 CAPSULE ORAL 3 TIMES DAILY
Qty: 90 CAPSULE | Refills: 3 | Status: SHIPPED | OUTPATIENT
Start: 2020-03-11 | End: 2020-08-04

## 2020-03-18 RX ORDER — MELOXICAM 7.5 MG/1
TABLET ORAL
Qty: 30 TABLET | Refills: 1 | Status: SHIPPED | OUTPATIENT
Start: 2020-03-18 | End: 2020-05-11

## 2020-03-18 NOTE — TELEPHONE ENCOUNTER
Medication:   Requested Prescriptions     Pending Prescriptions Disp Refills    meloxicam (MOBIC) 7.5 MG tablet [Pharmacy Med Name: Meloxicam 7.5 MG Oral Tablet] 30 tablet 0     Sig: Take 1 tablet by mouth once daily        Last Filled:      Patient Phone Number: [phone] (home)     Last appt: 3/3/2020   Next appt: 6/4/2020    Last OARRS: No flowsheet data found.     Preferred Pharmacy:   [facility] [address] MARJORIE JOE [address]  Phone: [phone] Fax: [phone]  [facility] [address], [address] [place] [address] [phone] -  [phone]  [place] [address]  Phone: [phone] Fax: [phone]

## 2020-03-26 ENCOUNTER — TELEPHONE (OUTPATIENT)
Dept: ORTHOPEDIC SURGERY | Age: 65
End: 2020-03-26

## 2020-03-26 NOTE — TELEPHONE ENCOUNTER
I called the patient and told her per Viann Stack that her MRI of her L-Spine showed some disc bulge at L5-S1 affecting the Rt and Lt side. She needs to follow up with Dr. Rian Matthews for further treatment. The patient was given 's number.

## 2020-04-01 ENCOUNTER — OFFICE VISIT (OUTPATIENT)
Dept: ORTHOPEDIC SURGERY | Age: 65
End: 2020-04-01
Payer: COMMERCIAL

## 2020-04-01 PROCEDURE — 95886 MUSC TEST DONE W/N TEST COMP: CPT | Performed by: PHYSICAL MEDICINE & REHABILITATION

## 2020-04-01 PROCEDURE — 95908 NRV CNDJ TST 3-4 STUDIES: CPT | Performed by: PHYSICAL MEDICINE & REHABILITATION

## 2020-05-11 RX ORDER — MELOXICAM 7.5 MG/1
TABLET ORAL
Qty: 30 TABLET | Refills: 2 | Status: SHIPPED | OUTPATIENT
Start: 2020-05-11 | End: 2021-02-23

## 2020-05-11 NOTE — TELEPHONE ENCOUNTER
Medication:   Requested Prescriptions     Pending Prescriptions Disp Refills    metFORMIN (GLUCOPHAGE) 1000 MG tablet [Pharmacy Med Name: metFORMIN HCl 1000 MG Oral Tablet] 180 tablet 0     Sig: TAKE 1 TABLET BY MOUTH TWICE DAILY WITH MEALS    meloxicam (MOBIC) 7.5 MG tablet [Pharmacy Med Name: Meloxicam 7.5 MG Oral Tablet] 30 tablet 0     Sig: Take 1 tablet by mouth once daily       Last Filled:      Patient Phone Number: [phone] (home)     Last appt: 3/3/2020   Next appt: 6/4/2020    Last Labs DM:   Lab Results   Component Value Date    LABA1C 7.3 03/03/2020       Last OARRS: No flowsheet data found.     Preferred Pharmacy:   [address] [place] [address] [address]  Phone: [phone] Fax: [phone]  [facility], [facility] [place] [phone] -  [phone]  [place] [address]  Phone: [phone] Fax: [phone]

## 2020-06-04 ENCOUNTER — VIRTUAL VISIT (OUTPATIENT)
Dept: PRIMARY CARE CLINIC | Age: 65
End: 2020-06-04
Payer: COMMERCIAL

## 2020-06-04 VITALS — WEIGHT: 115 LBS | HEIGHT: 61 IN | BODY MASS INDEX: 21.71 KG/M2 | TEMPERATURE: 98.4 F

## 2020-06-04 PROCEDURE — 3051F HG A1C>EQUAL 7.0%<8.0%: CPT | Performed by: INTERNAL MEDICINE

## 2020-06-04 PROCEDURE — 99214 OFFICE O/P EST MOD 30 MIN: CPT | Performed by: INTERNAL MEDICINE

## 2020-06-04 ASSESSMENT — ENCOUNTER SYMPTOMS
RHINORRHEA: 0
DIARRHEA: 0
ABDOMINAL PAIN: 0
NAUSEA: 0
SORE THROAT: 0
VOMITING: 0
CHEST TIGHTNESS: 0
CONSTIPATION: 0
SHORTNESS OF BREATH: 0
COUGH: 0

## 2020-06-04 ASSESSMENT — PATIENT HEALTH QUESTIONNAIRE - PHQ9
SUM OF ALL RESPONSES TO PHQ9 QUESTIONS 1 & 2: 0
SUM OF ALL RESPONSES TO PHQ QUESTIONS 1-9: 0
1. LITTLE INTEREST OR PLEASURE IN DOING THINGS: 0
SUM OF ALL RESPONSES TO PHQ QUESTIONS 1-9: 0
2. FEELING DOWN, DEPRESSED OR HOPELESS: 0

## 2020-06-04 NOTE — PROGRESS NOTES
2020    TELEHEALTH EVALUATION -- Audio/Visual (During - public health emergency)    Chief Complaint   Patient presents with    Diabetes     FBS  104    Hypertension    Hyperlipidemia       HPI:    Sarika Villa (:  1955) has requested an audio/video evaluation for the following concern(s):    Diabetes Mellitus Type 2 with neuropathy:  Current Medications: Metformin 1000mg po bid and Januvia 100mg po q day  Diet: low carbohydrate  Home blood sugar records: patient tests her blood sugar and fasting averages . Pt's blood sugar was 104 this morning  Any episodes of hypoglycemia? no  Eye exam current (within one year): yes on 5/15/20  Daily Aspirin? Yes 81mg po q day  Current exercise: patient walks 3-4 miles 4 days per week      Hypertension:    Current Medications: Lisinopril 5mg po q day  Home blood pressure monitoring: No  Diet: low salt     Hyperlipidemia:  Current medication: Rosuvastatin 20mg po qhs  Patient denies side effects  Diet: low fat, low cholesterol       Review of Systems   Constitutional: Negative for chills, fatigue and fever. HENT: Negative for congestion, postnasal drip, rhinorrhea, sinus pressure and sore throat. Eyes: Negative for visual disturbance. Respiratory: Negative for cough, chest tightness, shortness of breath and wheezing. Cardiovascular: Negative for chest pain, palpitations and leg swelling. Gastrointestinal: Negative for abdominal pain, blood in stool, constipation, diarrhea, nausea and vomiting. Genitourinary: Negative for dysuria, frequency and hematuria. Musculoskeletal: Negative for arthralgias and myalgias. Skin: Negative for rash and wound. Neurological: Positive for numbness. Negative for dizziness, tremors, syncope, weakness, light-headedness and headaches. Psychiatric/Behavioral: Negative for dysphoric mood and sleep disturbance. The patient is not nervous/anxious. Prior to Visit Medications    Medication Sig Taking? Authorizing Provider   metFORMIN (GLUCOPHAGE) 1000 MG tablet TAKE 1 TABLET BY MOUTH TWICE DAILY WITH MEALS Yes Arcelia Mcgregor MD   meloxicam (MOBIC) 7.5 MG tablet Take 1 tablet by mouth once daily Yes Arcelia Mcgregor MD   gabapentin (NEURONTIN) 100 MG capsule Take 1 capsule by mouth 3 times daily for 30 days. Yes Mike Perez MD   rosuvastatin (CRESTOR) 20 MG tablet Take 1 tablet by mouth daily Yes Arcelia Mcgregor MD   SITagliptin (JANUVIA) 100 MG tablet Take 1 tablet by mouth daily Yes Arcelia Mcgregor MD   lisinopril (PRINIVIL;ZESTRIL) 5 MG tablet Take 1 tablet by mouth daily Yes Arcelia Mcgregor MD   aspirin 81 MG tablet Take 1 tablet by mouth daily Yes Arcelia Mcgregor MD   blood glucose test strips (ONE TOUCH ULTRA TEST) strip USE 1 STRIP TO CHECK GLUCOSE TWICE A DAY AND AS NEEDED Yes Arcelia Mcgregor MD   Multiple Minerals TABS Take  by mouth. Yes Historical Provider, MD       Social History     Tobacco Use    Smoking status: Never Smoker    Smokeless tobacco: Never Used   Substance Use Topics    Alcohol use: Yes     Alcohol/week: 5.0 standard drinks     Types: 5 Glasses of wine per week    Drug use: No        No Known Allergies    PHYSICAL EXAMINATION:  [ INSTRUCTIONS:  \"[x]\" Indicates a positive item  \"[]\" Indicates a negative item  -- DELETE ALL ITEMS NOT EXAMINED]  Vital Signs: (As obtained by patient/caregiver or practitioner observation)    Blood pressure-  Heart rate-    Respiratory rate-    Temperature-  Pulse oximetry-     Constitutional: [x] Appears well-developed and well-nourished [x] No apparent distress      [] Abnormal-   Mental status  [x] Alert and awake  [x] Oriented to person/place/time [x]Able to follow commands      Eyes:  EOM    [x]  Normal  [] Abnormal-  Sclera  [x]  Normal  [] Abnormal -         Discharge [x]  None visible  [] Abnormal -    HENT:   [x] Normocephalic, atraumatic.   [] Abnormal   [x] Mouth/Throat: Mucous membranes are moist.     [employer] electronic signature was used to authenticate this note.

## 2020-06-08 ENCOUNTER — TELEPHONE (OUTPATIENT)
Dept: PRIMARY CARE CLINIC | Age: 65
End: 2020-06-08

## 2020-06-08 NOTE — TELEPHONE ENCOUNTER
[facility] received a call from:    Name of Caller: Patient    Relationship to patient:N/A    Organization name: N/A    Best contact number: [phone]    Reason for call: Notice a open blister under her left middle toe, patient would like a return call.

## 2020-06-10 ASSESSMENT — ENCOUNTER SYMPTOMS
WHEEZING: 0
BLOOD IN STOOL: 0
SINUS PRESSURE: 0

## 2020-06-15 ENCOUNTER — VIRTUAL VISIT (OUTPATIENT)
Dept: PRIMARY CARE CLINIC | Age: 65
End: 2020-06-15
Payer: COMMERCIAL

## 2020-06-15 PROCEDURE — 99213 OFFICE O/P EST LOW 20 MIN: CPT | Performed by: INTERNAL MEDICINE

## 2020-06-15 RX ORDER — CLOTRIMAZOLE 1 %
CREAM (GRAM) TOPICAL
Qty: 30 G | Refills: 0 | Status: SHIPPED | OUTPATIENT
Start: 2020-06-15 | End: 2020-06-22

## 2020-06-15 SDOH — ECONOMIC STABILITY: TRANSPORTATION INSECURITY
IN THE PAST 12 MONTHS, HAS THE LACK OF TRANSPORTATION KEPT YOU FROM MEDICAL APPOINTMENTS OR FROM GETTING MEDICATIONS?: NO

## 2020-06-15 SDOH — ECONOMIC STABILITY: INCOME INSECURITY: HOW HARD IS IT FOR YOU TO PAY FOR THE VERY BASICS LIKE FOOD, HOUSING, MEDICAL CARE, AND HEATING?: NOT HARD AT ALL

## 2020-06-15 SDOH — ECONOMIC STABILITY: TRANSPORTATION INSECURITY
IN THE PAST 12 MONTHS, HAS LACK OF TRANSPORTATION KEPT YOU FROM MEETINGS, WORK, OR FROM GETTING THINGS NEEDED FOR DAILY LIVING?: NO

## 2020-06-15 SDOH — ECONOMIC STABILITY: FOOD INSECURITY: WITHIN THE PAST 12 MONTHS, YOU WORRIED THAT YOUR FOOD WOULD RUN OUT BEFORE YOU GOT MONEY TO BUY MORE.: NEVER TRUE

## 2020-06-15 SDOH — ECONOMIC STABILITY: FOOD INSECURITY: WITHIN THE PAST 12 MONTHS, THE FOOD YOU BOUGHT JUST DIDN'T LAST AND YOU DIDN'T HAVE MONEY TO GET MORE.: NEVER TRUE

## 2020-06-15 ASSESSMENT — PATIENT HEALTH QUESTIONNAIRE - PHQ9
SUM OF ALL RESPONSES TO PHQ QUESTIONS 1-9: 0
SUM OF ALL RESPONSES TO PHQ QUESTIONS 1-9: 0
2. FEELING DOWN, DEPRESSED OR HOPELESS: 0
1. LITTLE INTEREST OR PLEASURE IN DOING THINGS: 0
SUM OF ALL RESPONSES TO PHQ9 QUESTIONS 1 & 2: 0

## 2020-06-15 NOTE — PROGRESS NOTES
posterior left middle toe crease, skin crack in crease of posterior left middle toe, no drainage, no erythema, no papules, no vesicles, no pustules    ASSESSMENT/PLAN:  1. Tinea pedis of left foot  - clotrimazole (LOTRIMIN) 1 % cream; Apply topically 2 times daily. Dispense: 30 g; Refill: 0      Return in about 2 weeks (around 6/29/2020) for tinea pedis. Justin Gilbert is a 60 y.o. female being evaluated by a Virtual Visit (video visit) encounter to address concerns as mentioned above. A caregiver was present when appropriate. Due to this being a TeleHealth encounter (During FWP-04 public health emergency), evaluation of the following organ systems was limited: Vitals/Constitutional/EENT/Resp/CV/GI//MS/Neuro/Skin/Heme-Lymph-Imm. Pursuant to the emergency declaration under the [address] [place] [address] and the [employer] Act, this Virtual Visit was conducted with patient's (and/or legal guardian's) consent, to reduce the patient's risk of exposure to COVID-19 and provide necessary medical care. The patient (and/or legal guardian) has also been advised to contact this office for worsening conditions or problems, and seek emergency medical treatment and/or call 911 if deemed necessary. Patient identification was verified at the start of the visit: Yes    Total time spent on this encounter: Not billed by time    Services were provided through a video synchronous discussion virtually to substitute for in-person clinic visit. Patient and provider were located at their individual homes. --Mariely Obrien MD on 6/16/2020 at 5:24 PM    An electronic signature was used to authenticate this note.

## 2020-06-16 ASSESSMENT — ENCOUNTER SYMPTOMS
COLOR CHANGE: 0
SHORTNESS OF BREATH: 0

## 2020-08-03 RX ORDER — BLOOD SUGAR DIAGNOSTIC
STRIP MISCELLANEOUS
Qty: 200 EACH | Refills: 2 | Status: SHIPPED | OUTPATIENT
Start: 2020-08-03

## 2020-08-03 RX ORDER — ROSUVASTATIN CALCIUM 20 MG/1
TABLET, COATED ORAL
Qty: 90 TABLET | Refills: 0 | Status: SHIPPED | OUTPATIENT
Start: 2020-08-03 | End: 2021-06-07

## 2020-08-03 RX ORDER — LISINOPRIL 5 MG/1
TABLET ORAL
Qty: 90 TABLET | Refills: 0 | Status: SHIPPED | OUTPATIENT
Start: 2020-08-03 | End: 2021-01-07

## 2020-08-03 NOTE — TELEPHONE ENCOUNTER
Medication:   Requested Prescriptions     Pending Prescriptions Disp Refills    lisinopril (PRINIVIL;ZESTRIL) 5 MG tablet [Pharmacy Med Name: Lisinopril 5 MG Oral Tablet] 90 tablet 0     Sig: Take 1 tablet by mouth once daily    rosuvastatin (CRESTOR) 20 MG tablet [Pharmacy Med Name: Rosuvastatin Calcium 20 MG Oral Tablet] 90 tablet 0     Sig: Take 1 tablet by mouth once daily    blood glucose test strips (ONETOUCH ULTRA) strip [Pharmacy Med Name: OneTouch Ultra Blue In Vitro Strip] 200 each 0     Sig: USE 1 STRIP TO CHECK GLUCOSE TWICE DAILY AS NEEDED     Last Filled: 3.3.20, 3.3.20, 5.15.19    Last appt: 6.15.20   Next appt: Visit date not found    Last OARRS: No flowsheet data found.

## 2020-08-04 RX ORDER — GABAPENTIN 100 MG/1
CAPSULE ORAL
Qty: 90 CAPSULE | Refills: 1 | Status: SHIPPED | OUTPATIENT
Start: 2020-08-04 | End: 2020-11-19

## 2020-10-14 RX ORDER — SITAGLIPTIN 100 MG/1
TABLET, FILM COATED ORAL
Qty: 90 TABLET | Refills: 0 | Status: SHIPPED | OUTPATIENT
Start: 2020-10-14 | End: 2021-02-22

## 2020-10-14 NOTE — TELEPHONE ENCOUNTER
Call patient to schedule appointment for diabetes, hypertension, and hyperlipidemia. She was last seen on 6/4/20 by Virtual appointment and has no appointments scheduled.

## 2020-10-15 NOTE — TELEPHONE ENCOUNTER
Call pt. I ordered fasting labs for a comprehensive metabolic panel, Hemoglobin A1c, and lipid panel. The orders are in Epic and she can have them done prior to her appointment.

## 2020-10-19 ENCOUNTER — OFFICE VISIT (OUTPATIENT)
Dept: PRIMARY CARE CLINIC | Age: 65
End: 2020-10-19
Payer: COMMERCIAL

## 2020-10-19 VITALS
TEMPERATURE: 97.4 F | SYSTOLIC BLOOD PRESSURE: 116 MMHG | HEART RATE: 68 BPM | OXYGEN SATURATION: 98 % | DIASTOLIC BLOOD PRESSURE: 82 MMHG | RESPIRATION RATE: 16 BRPM | BODY MASS INDEX: 23.39 KG/M2 | WEIGHT: 123.8 LBS

## 2020-10-19 LAB
CREATININE URINE: 96.4 MG/DL (ref 28–259)
MICROALBUMIN UR-MCNC: 1.3 MG/DL
MICROALBUMIN/CREAT UR-RTO: 13.5 MG/G (ref 0–30)

## 2020-10-19 PROCEDURE — 90471 IMMUNIZATION ADMIN: CPT | Performed by: INTERNAL MEDICINE

## 2020-10-19 PROCEDURE — 99214 OFFICE O/P EST MOD 30 MIN: CPT | Performed by: INTERNAL MEDICINE

## 2020-10-19 PROCEDURE — 90686 IIV4 VACC NO PRSV 0.5 ML IM: CPT | Performed by: INTERNAL MEDICINE

## 2020-10-19 ASSESSMENT — ENCOUNTER SYMPTOMS
VOMITING: 0
DIARRHEA: 0
NAUSEA: 0
ABDOMINAL PAIN: 0
BLOOD IN STOOL: 0
WHEEZING: 0
SHORTNESS OF BREATH: 0
RHINORRHEA: 0
CONSTIPATION: 0
SINUS PRESSURE: 0
SORE THROAT: 0
COUGH: 0
CHEST TIGHTNESS: 0

## 2020-10-19 NOTE — PATIENT INSTRUCTIONS
1. Type 2 diabetes mellitus with peripheral neuropathy (HCC)  -Continue same medications  -Limit carbohydrates to 45 grams with meals and 15 grams with snacks  -monitor blood sugars  -Regular aerobic exercise  - MICROALBUMIN / CREATININE URINE RATIO  -  DIABETES FOOT EXAM    2. Essential hypertension  -stable  -Continue same medications  -Low sodium diet  -Regular aerobic exercise    3. Mixed hyperlipidemia  -Continue same medications  -Low fat, low cholesterol diet  -Regular aerobic exercise    4.  Flu vaccine need  - INFLUENZA, QUADV, 3 YRS AND OLDER, IM PF, PREFILL SYR OR SDV, 0.5ML (AFLURIA QUADV, PF) given

## 2020-10-19 NOTE — PROGRESS NOTES
Vaccine Information Sheet, \"Influenza - Inactivated\"  given to Yuniel Rubio, or parent/legal guardian of  Yuniel Rubio and verbalized understanding. Patient responses:    Have you ever had a reaction to a flu vaccine? No  Do you have any current illness? No  Have you ever had Guillian Wadley Syndrome? No  Do you have a serious allergy to any of the follow: Neomycin, Polymyxin, Thimerosal, eggs or egg products? No    Flu vaccine given per order. Please see immunization tab. Risks and benefits explained. Current VIS given.

## 2020-10-23 ENCOUNTER — TELEPHONE (OUTPATIENT)
Dept: PRIMARY CARE CLINIC | Age: 65
End: 2020-10-23

## 2020-10-23 NOTE — TELEPHONE ENCOUNTER
Pt's  called and stated that the metformin has a recall on it. The pt's  is going to send us a document to prove that there is a recall. He is requesting something different for his wife to take.     metFORMIN (GLUCOPHAGE) 1000 MG tablet [[id number]]      [address] [facility], [facility], [address] [facility]

## 2020-10-27 NOTE — TELEPHONE ENCOUNTER
Medication:   Requested Prescriptions     Pending Prescriptions Disp Refills    metFORMIN (GLUCOPHAGE) 1000 MG tablet 180 tablet 1     Sig: TAKE 1 TABLET BY MOUTH TWICE DAILY WITH MEALS     Last Filled: 5.11.20    Last appt: 10/19/2020   Next appt: 2/19/2021    Last OARRS: No flowsheet data found.

## 2020-10-29 NOTE — TELEPHONE ENCOUNTER
Spoke with patient. Her fasting sugars 70's-90's. Her most recent Hgb A1c is 6.9%. Told patient it is ok to discontinue Metformin due to her concerns and recalls. Patient to continue Januvia 100mg once daily.

## 2020-11-19 RX ORDER — GABAPENTIN 100 MG/1
CAPSULE ORAL
Qty: 90 CAPSULE | Refills: 0 | Status: SHIPPED | OUTPATIENT
Start: 2020-11-19 | End: 2021-03-08

## 2021-01-07 DIAGNOSIS — I10 ESSENTIAL HYPERTENSION: ICD-10-CM

## 2021-01-07 RX ORDER — LISINOPRIL 5 MG/1
TABLET ORAL
Qty: 90 TABLET | Refills: 0 | Status: SHIPPED | OUTPATIENT
Start: 2021-01-07 | End: 2021-03-29

## 2021-01-07 NOTE — TELEPHONE ENCOUNTER
Medication:   Requested Prescriptions     Pending Prescriptions Disp Refills    lisinopril (PRINIVIL;ZESTRIL) 5 MG tablet [Pharmacy Med Name: Lisinopril 5 MG Oral Tablet] 90 tablet 0     Sig: Take 1 tablet by mouth once daily     Last Filled:  8.3.20  Last appt: 10/19/2020   Next appt: 2/19/2021    Last OARRS: No flowsheet data found.

## 2021-02-19 ENCOUNTER — OFFICE VISIT (OUTPATIENT)
Dept: PRIMARY CARE CLINIC | Age: 66
End: 2021-02-19
Payer: COMMERCIAL

## 2021-02-19 VITALS
BODY MASS INDEX: 26.06 KG/M2 | DIASTOLIC BLOOD PRESSURE: 78 MMHG | WEIGHT: 138 LBS | SYSTOLIC BLOOD PRESSURE: 122 MMHG | HEART RATE: 80 BPM | RESPIRATION RATE: 15 BRPM | TEMPERATURE: 97 F | HEIGHT: 61 IN | OXYGEN SATURATION: 96 %

## 2021-02-19 DIAGNOSIS — E11.42 TYPE 2 DIABETES MELLITUS WITH PERIPHERAL NEUROPATHY (HCC): ICD-10-CM

## 2021-02-19 DIAGNOSIS — E78.2 MIXED HYPERLIPIDEMIA: ICD-10-CM

## 2021-02-19 DIAGNOSIS — I10 ESSENTIAL HYPERTENSION: ICD-10-CM

## 2021-02-19 DIAGNOSIS — E11.42 TYPE 2 DIABETES MELLITUS WITH PERIPHERAL NEUROPATHY (HCC): Primary | ICD-10-CM

## 2021-02-19 LAB
A/G RATIO: 1.4 (ref 1.1–2.2)
ALBUMIN SERPL-MCNC: 4.3 G/DL (ref 3.4–5)
ALP BLD-CCNC: 65 U/L (ref 40–129)
ALT SERPL-CCNC: 18 U/L (ref 10–40)
ANION GAP SERPL CALCULATED.3IONS-SCNC: 10 MMOL/L (ref 3–16)
AST SERPL-CCNC: 25 U/L (ref 15–37)
BILIRUB SERPL-MCNC: <0.2 MG/DL (ref 0–1)
BUN BLDV-MCNC: 14 MG/DL (ref 7–20)
CALCIUM SERPL-MCNC: 9.8 MG/DL (ref 8.3–10.6)
CHLORIDE BLD-SCNC: 102 MMOL/L (ref 99–110)
CHOLESTEROL, TOTAL: 175 MG/DL (ref 0–199)
CO2: 27 MMOL/L (ref 21–32)
CREAT SERPL-MCNC: 1.1 MG/DL (ref 0.6–1.2)
GFR AFRICAN AMERICAN: >60
GFR NON-AFRICAN AMERICAN: 50
GLOBULIN: 3.1 G/DL
GLUCOSE BLD-MCNC: 181 MG/DL (ref 70–99)
HBA1C MFR BLD: 9.1 %
HDLC SERPL-MCNC: 78 MG/DL (ref 40–60)
LDL CHOLESTEROL CALCULATED: 82 MG/DL
POTASSIUM SERPL-SCNC: 4.5 MMOL/L (ref 3.5–5.1)
SODIUM BLD-SCNC: 139 MMOL/L (ref 136–145)
TOTAL PROTEIN: 7.4 G/DL (ref 6.4–8.2)
TRIGL SERPL-MCNC: 75 MG/DL (ref 0–150)
VLDLC SERPL CALC-MCNC: 15 MG/DL

## 2021-02-19 PROCEDURE — 99214 OFFICE O/P EST MOD 30 MIN: CPT | Performed by: INTERNAL MEDICINE

## 2021-02-19 PROCEDURE — 83036 HEMOGLOBIN GLYCOSYLATED A1C: CPT | Performed by: INTERNAL MEDICINE

## 2021-02-19 ASSESSMENT — ENCOUNTER SYMPTOMS
SORE THROAT: 0
CONSTIPATION: 0
ABDOMINAL PAIN: 0
SHORTNESS OF BREATH: 0
COUGH: 0
VOMITING: 0
SINUS PRESSURE: 0
DIARRHEA: 0
BLOOD IN STOOL: 0
CHEST TIGHTNESS: 0
WHEEZING: 0
RHINORRHEA: 0
NAUSEA: 0

## 2021-02-19 ASSESSMENT — PATIENT HEALTH QUESTIONNAIRE - PHQ9
1. LITTLE INTEREST OR PLEASURE IN DOING THINGS: 0
2. FEELING DOWN, DEPRESSED OR HOPELESS: 0

## 2021-02-19 NOTE — PROGRESS NOTES
Eric Barreto   Date ofBirth:  1955    Date of Visit:  2/19/2021    Chief Complaint   Patient presents with    Diabetes    Hypertension    Hyperlipidemia       HPI  Diabetes Mellitus Type 2 with neuropathy:  Pt has numbness and tingling of both feet. Current Medications: Patient takes Metformin 1000mg po q day instead of bid as prescribed. Patient  states she hasn't been taking the evening dose of Metformin. Patient also takes Januvia 100mg po q day  Diet: Patient hasn't been decreasing carbohydrates. Patient states she tends to eat later. Patient states she eats fruit, toast, 1/2 muffin, and couple of pieces of khanna in the morning. Patient states she eats snacks for lunch. Patient states she eats chicken, beef, and fish and a vegetable for dinner. Home blood sugar records: Patient monitors her blood sugar fasting and bedtime. Patient doesn't have her blood sugar log with her. Any episodes of hypoglycemia? no  Eye exam current (within one year): yes on 5/15/20  Daily Aspirin? Yes 81mg po q day  Current exercise: walks 3-4 miles 5 times per week except when is snows or rains. Patient states she also goes up and down stairs from basement to top floor. Hypertension:    Current Medications: Lisinopril 5mg po q day  Home blood pressure monitoring: No  Diet: low salt     Hyperlipidemia:  Current medication: Rosuvastatin 20mg po qhs  Patient denies side effects  Diet: low fat, low cholesterol. Patient states she tends to eat later      Review of Systems   Constitutional: Positive for unexpected weight change. Negative for chills, fatigue and fever. HENT: Negative for congestion, postnasal drip, rhinorrhea, sinus pressure and sore throat. Eyes: Negative for visual disturbance. Respiratory: Negative for cough, chest tightness, shortness of breath and wheezing. Cardiovascular: Negative for chest pain, palpitations and leg swelling.    Gastrointestinal: Negative for abdominal pain, blood in stool, constipation, diarrhea, nausea and vomiting. Genitourinary: Negative for dysuria, frequency and hematuria. Musculoskeletal: Negative for arthralgias and myalgias. Skin: Negative for rash and wound. Neurological: Positive for numbness. Negative for dizziness, tremors, syncope, weakness, light-headedness and headaches. Psychiatric/Behavioral: Negative for dysphoric mood and sleep disturbance. The patient is not nervous/anxious. No Known Allergies  Outpatient Medications Marked as Taking for the 2/19/21 encounter (Office Visit) with Feng Knapp MD     Medication Sig Dispense Refill    metFORMIN (GLUCOPHAGE) 1000 MG tablet TAKE 1 TABLET BY MOUTH TWICE DAILY WITH MEALS 180 tablet 1    lisinopril (PRINIVIL;ZESTRIL) 5 MG tablet Take 1 tablet by mouth once daily 90 tablet 0    rosuvastatin (CRESTOR) 20 MG tablet Take 1 tablet by mouth once daily 90 tablet 0    blood glucose test strips (ONETOUCH ULTRA) strip USE 1 STRIP TO CHECK GLUCOSE TWICE DAILY AS NEEDED 200 each 2    aspirin 81 MG tablet Take 1 tablet by mouth daily 30 tablet 11    Multiple Minerals TABS Take  by mouth.  meloxicam (MOBIC) 7.5 MG tablet Take 1 tablet by mouth once daily 30 tablet 1    JANUVIA 100 MG tablet Take 1 tablet by mouth once daily 90 tablet 1    gabapentin (NEURONTIN) 100 MG capsule TAKE 1 CAPSULE BY MOUTH THREE TIMES DAILY FOR 30 DAYS 90 capsule 0     No current facility-administered medications for this visit. Vitals:    02/19/21 0917   BP: 122/78   Site: Left Upper Arm   Position: Sitting   Cuff Size: Medium Adult   Pulse: 80   Resp: 15   Temp: 97 °F (36.1 °C)   TempSrc: Skin   SpO2: 96%   Weight: 138 lb (62.6 kg)   Height: 5' 1\" (1.549 m)     Body mass index is 26.07 kg/m². Physical Exam  Nursing note reviewed. Constitutional:       General: She is not in acute distress. Appearance: Normal appearance. She is well-developed.    Eyes:      General: Lids are normal.      Extraocular Movements: Extraocular movements intact. Conjunctiva/sclera: Conjunctivae normal.      Pupils: Pupils are equal, round, and reactive to light. Neck:      Musculoskeletal: Neck supple. Thyroid: No thyromegaly. Vascular: No carotid bruit. Cardiovascular:      Rate and Rhythm: Normal rate and regular rhythm. Heart sounds: Normal heart sounds, S1 normal and S2 normal. No murmur. No friction rub. No gallop. Pulmonary:      Effort: Pulmonary effort is normal. No respiratory distress. Breath sounds: Normal breath sounds. No wheezing, rhonchi or rales. Abdominal:      General: Bowel sounds are normal. There is no distension. Palpations: Abdomen is soft. Tenderness: There is no abdominal tenderness. Musculoskeletal:      Right lower leg: No edema. Left lower leg: No edema. Lymphadenopathy:      Head:      Right side of head: No submandibular adenopathy. Left side of head: No submandibular adenopathy. Neurological:      Mental Status: She is alert and oriented to person, place, and time.    Psychiatric:         Mood and Affect: Mood normal.           Results for POC orders placed in visit on 02/19/21   POCT glycosylated hemoglobin (Hb A1C)   Result Value Ref Range    Hemoglobin A1C 9.1 %     Lab Review   Office Visit on 10/19/2020   Component Date Value    Microalbumin, Random Uri* 10/19/2020 1.30     Creatinine, Ur 10/19/2020 96.4     Microalbumin Creatinine * 10/19/2020 13.5    Orders Only on 10/19/2020   Component Date Value    Hemoglobin A1C 10/19/2020 6.9     eAG 10/19/2020 151.3     Cholesterol, Total 10/19/2020 157     Triglycerides 10/19/2020 72     HDL 10/19/2020 78*    LDL Calculated 10/19/2020 65     VLDL Cholesterol Calcula* 10/19/2020 14     Sodium 10/19/2020 142     Potassium 10/19/2020 5.2*    Chloride 10/19/2020 101     CO2 10/19/2020 25     Anion Gap 10/19/2020 16     Glucose 10/19/2020 91     BUN 10/19/2020 16     CREATININE 10/19/2020 1.1     GFR Non- 10/19/2020 50*    GFR  10/19/2020 >60     Calcium 10/19/2020 10.0     Total Protein 10/19/2020 7.4     Albumin 10/19/2020 4.5     Albumin/Globulin Ratio 10/19/2020 1.6     Total Bilirubin 10/19/2020 0.3     Alkaline Phosphatase 10/19/2020 57     ALT 10/19/2020 14     AST 10/19/2020 27     Globulin 10/19/2020 2.9          Assessment/Plan     1. Type 2 diabetes mellitus with peripheral neuropathy (HCC)  - Hemoglobin A1c of 9.1% shows diabetes is uncontrolled  -Continue same medications  -Increase Metformin 1000mg to 2 times daily  -Gabapentin 100mg nightly for neuropathy  -Limit carbohydrates to 45 grams with meals and 15 grams with snacks  -monitor blood sugars  -Regular aerobic exercise  - POCT glycosylated hemoglobin (Hb A1C)  - Comprehensive Metabolic Panel; Future    2. Essential hypertension  -stable  -Continue same medications  -Low sodium diet  -Regular aerobic exercise  -Comprehensive Metabolic Panel; Future    3. Mixed hyperlipidemia  -Continue same medications  -Low fat, low cholesterol diet  -Regular aerobic exercise  - Comprehensive Metabolic Panel; Future  - Lipid Panel; Future      Discussed medications with patient, who voiced understanding of their use and indications. All questions answered. Return in about 3 months (around 5/19/2021) for diabetes.

## 2021-02-19 NOTE — PATIENT INSTRUCTIONS
-Continue same medications  -Increase Metformin 1000mg to 2 times daily  -Limit carbohydrates to 45 grams with meals and 15 grams with snacks  -Low sodium diet  -Low fat, low cholesterol diet  -Regular aerobic exercise

## 2021-02-22 DIAGNOSIS — M25.552 LEFT HIP PAIN: ICD-10-CM

## 2021-02-22 DIAGNOSIS — E11.42 TYPE 2 DIABETES MELLITUS WITH PERIPHERAL NEUROPATHY (HCC): ICD-10-CM

## 2021-02-22 RX ORDER — SITAGLIPTIN 100 MG/1
TABLET, FILM COATED ORAL
Qty: 90 TABLET | Refills: 1 | Status: SHIPPED | OUTPATIENT
Start: 2021-02-22 | End: 2021-08-16

## 2021-02-22 NOTE — TELEPHONE ENCOUNTER
Medication:   Requested Prescriptions     Pending Prescriptions Disp Refills    meloxicam (MOBIC) 7.5 MG tablet [Pharmacy Med Name: Meloxicam 7.5 MG Oral Tablet] 30 tablet 0     Sig: Take 1 tablet by mouth once daily     Last Filled: 5.11.20    Last appt: 2/19/2021   Next appt: Visit date not found    Last OARRS: No flowsheet data found.

## 2021-02-22 NOTE — TELEPHONE ENCOUNTER
Medication:   Requested Prescriptions     Pending Prescriptions Disp Refills    JANUVIA 100 MG tablet [Pharmacy Med Name: Januvia 100 MG Oral Tablet] 90 tablet 0     Sig: Take 1 tablet by mouth once daily     Last Filled: 10.14.20    Last appt: 2/19/2021   Next appt: Visit date not found    Last OARRS: No flowsheet data found.

## 2021-02-23 RX ORDER — MELOXICAM 7.5 MG/1
TABLET ORAL
Qty: 30 TABLET | Refills: 1 | Status: SHIPPED | OUTPATIENT
Start: 2021-02-23 | End: 2021-05-04

## 2021-03-08 RX ORDER — GABAPENTIN 100 MG/1
CAPSULE ORAL
Qty: 90 CAPSULE | Refills: 5 | Status: SHIPPED | OUTPATIENT
Start: 2021-03-08 | End: 2022-07-27 | Stop reason: ALTCHOICE

## 2021-03-28 DIAGNOSIS — I10 ESSENTIAL HYPERTENSION: ICD-10-CM

## 2021-03-29 RX ORDER — LISINOPRIL 5 MG/1
TABLET ORAL
Qty: 90 TABLET | Refills: 1 | Status: SHIPPED | OUTPATIENT
Start: 2021-03-29 | End: 2021-09-07 | Stop reason: ALTCHOICE

## 2021-03-29 NOTE — TELEPHONE ENCOUNTER
Medication:   Requested Prescriptions     Pending Prescriptions Disp Refills    lisinopril (PRINIVIL;ZESTRIL) 5 MG tablet [Pharmacy Med Name: Lisinopril 5 MG Oral Tablet] 90 tablet 0     Sig: Take 1 tablet by mouth once daily       Last Filled:      Patient Phone Number: [phone] (home)     Last appt: 2/19/2021   Next appt: Visit date not found    Last BMP:   Lab Results   Component Value Date     02/19/2021    K 4.5 02/19/2021    K 4.7 05/07/2019     02/19/2021    CO2 27 02/19/2021    ANIONGAP 10 02/19/2021    GLUCOSE 181 02/19/2021    BUN 14 02/19/2021    CREATININE 1.1 02/19/2021    LABGLOM 50 02/19/2021    GFRAA >60 02/19/2021    GFRAA >60 03/22/2013    CALCIUM 9.8 02/19/2021      Last CMP:   Lab Results   Component Value Date     02/19/2021    K 4.5 02/19/2021    K 4.7 05/07/2019     02/19/2021    CO2 27 02/19/2021    ANIONGAP 10 02/19/2021    GLUCOSE 181 02/19/2021    BUN 14 02/19/2021    CREATININE 1.1 02/19/2021    LABGLOM 50 02/19/2021    GFRAA >60 02/19/2021    GFRAA >60 03/22/2013    PROT 7.4 02/19/2021    PROT 7.3 12/10/2012    LABALBU 4.3 02/19/2021    AGRATIO 1.4 02/19/2021    BILITOT <0.2 02/19/2021    ALKPHOS 65 02/19/2021    ALT 18 02/19/2021    AST 25 02/19/2021    GLOB 3.1 02/19/2021     Last Renal Function:   Lab Results   Component Value Date     02/19/2021    K 4.5 02/19/2021    K 4.7 05/07/2019     02/19/2021    CO2 27 02/19/2021    GLUCOSE 181 02/19/2021    BUN 14 02/19/2021    CREATININE 1.1 02/19/2021    LABALBU 4.3 02/19/2021    CALCIUM 9.8 02/19/2021    GFR >60 03/22/2013    GFRAA >60 02/19/2021    GFRAA >60 03/22/2013       Last OARRS: No flowsheet data found.     Preferred Pharmacy:   [facility] [address], [address] [facility] [address] [place] [address]  Phone: [phone] Fax: [phone]    [facility] [address] [phone] - [address] [place], [place] [address]  Phone: [phone] Fax: [phone]

## 2021-05-02 DIAGNOSIS — M25.552 LEFT HIP PAIN: ICD-10-CM

## 2021-05-03 NOTE — TELEPHONE ENCOUNTER
Medication:   Requested Prescriptions     Pending Prescriptions Disp Refills    meloxicam (MOBIC) 7.5 MG tablet [Pharmacy Med Name: Meloxicam 7.5 MG Oral Tablet] 30 tablet 0     Sig: Take 1 tablet by mouth once daily     Last Filled: 2.23.21  Last appt: 2/19/2021   Next appt: Visit date not found    Last OARRS: No flowsheet data found.

## 2021-05-04 RX ORDER — MELOXICAM 7.5 MG/1
TABLET ORAL
Qty: 30 TABLET | Refills: 1 | Status: SHIPPED | OUTPATIENT
Start: 2021-05-04 | End: 2021-07-07

## 2021-06-06 DIAGNOSIS — E78.2 MIXED HYPERLIPIDEMIA: ICD-10-CM

## 2021-06-07 RX ORDER — ROSUVASTATIN CALCIUM 20 MG/1
TABLET, COATED ORAL
Qty: 90 TABLET | Refills: 0 | Status: SHIPPED | OUTPATIENT
Start: 2021-06-07 | End: 2021-09-25

## 2021-06-07 NOTE — TELEPHONE ENCOUNTER
Medication:   Requested Prescriptions     Pending Prescriptions Disp Refills    rosuvastatin (CRESTOR) 20 MG tablet [Pharmacy Med Name: Rosuvastatin Calcium 20 MG Oral Tablet] 90 tablet 0     Sig: Take 1 tablet by mouth once daily       Last Filled:      Patient Phone Number: [phone] (home)     Last appt: 2/19/2021   Next appt: Visit date not found    Last Lipid:   Lab Results   Component Value Date    CHOL 175 02/19/2021    TRIG 75 02/19/2021    HDL 78 02/19/2021    HDL 63 02/14/2012    [facility] 82 02/19/2021       Last OARRS: No flowsheet data found.     Preferred Pharmacy:   [facility] [address]  Phone: [phone] Fax: [phone]  [facility] marcio [address], [address] [phone] - f [phone]  [place] [address]  Phone: [phone] Fax: [phone]

## 2021-07-06 DIAGNOSIS — M25.552 LEFT HIP PAIN: ICD-10-CM

## 2021-07-07 RX ORDER — MELOXICAM 7.5 MG/1
TABLET ORAL
Qty: 30 TABLET | Refills: 0 | Status: SHIPPED | OUTPATIENT
Start: 2021-07-07 | End: 2021-08-19

## 2021-07-07 NOTE — TELEPHONE ENCOUNTER
Call patient to schedule appointment for diabetes, hypertension, and hyperlipidemia. She was last seen on 2/19/21 and has no appointments scheduled.

## 2021-07-07 NOTE — TELEPHONE ENCOUNTER
Medication:   Requested Prescriptions     Pending Prescriptions Disp Refills    meloxicam (MOBIC) 7.5 MG tablet [Pharmacy Med Name: Meloxicam 7.5 MG Oral Tablet] 60 tablet 0     Sig: Take 1 tablet by mouth once daily        Last Filled:      Patient Phone Number: [phone] (home)     Last appt: 2/19/2021   Next appt: Visit date not found    Last OARRS: No flowsheet data found.     Preferred Pharmacy:   [facility] [address]  Phone: [phone] Fax: [phone]  Kan Cespedes. [place] Officer, [address] [place] [address] [phone] - f [phone]  [place] [address]  Phone: [phone]: [phone]

## 2021-07-20 ENCOUNTER — OFFICE VISIT (OUTPATIENT)
Dept: PRIMARY CARE CLINIC | Age: 66
End: 2021-07-20
Payer: COMMERCIAL

## 2021-07-20 VITALS
DIASTOLIC BLOOD PRESSURE: 68 MMHG | BODY MASS INDEX: 20.67 KG/M2 | WEIGHT: 109.4 LBS | RESPIRATION RATE: 16 BRPM | SYSTOLIC BLOOD PRESSURE: 100 MMHG | TEMPERATURE: 97.9 F

## 2021-07-20 DIAGNOSIS — R63.0 APPETITE LOSS: ICD-10-CM

## 2021-07-20 DIAGNOSIS — I10 ESSENTIAL HYPERTENSION: ICD-10-CM

## 2021-07-20 DIAGNOSIS — E11.42 TYPE 2 DIABETES MELLITUS WITH PERIPHERAL NEUROPATHY (HCC): Primary | ICD-10-CM

## 2021-07-20 DIAGNOSIS — E78.2 MIXED HYPERLIPIDEMIA: ICD-10-CM

## 2021-07-20 DIAGNOSIS — R63.4 WEIGHT LOSS: ICD-10-CM

## 2021-07-20 DIAGNOSIS — E11.42 TYPE 2 DIABETES MELLITUS WITH PERIPHERAL NEUROPATHY (HCC): ICD-10-CM

## 2021-07-20 LAB
A/G RATIO: 1.9 (ref 1.1–2.2)
ALBUMIN SERPL-MCNC: 4.5 G/DL (ref 3.4–5)
ALP BLD-CCNC: 37 U/L (ref 40–129)
ALT SERPL-CCNC: 17 U/L (ref 10–40)
ANION GAP SERPL CALCULATED.3IONS-SCNC: 12 MMOL/L (ref 3–16)
AST SERPL-CCNC: 34 U/L (ref 15–37)
BASOPHILS ABSOLUTE: 0 K/UL (ref 0–0.2)
BASOPHILS RELATIVE PERCENT: 0.6 %
BILIRUB SERPL-MCNC: 0.3 MG/DL (ref 0–1)
BUN BLDV-MCNC: 20 MG/DL (ref 7–20)
CALCIUM SERPL-MCNC: 9.8 MG/DL (ref 8.3–10.6)
CHLORIDE BLD-SCNC: 106 MMOL/L (ref 99–110)
CHOLESTEROL, TOTAL: 138 MG/DL (ref 0–199)
CO2: 24 MMOL/L (ref 21–32)
CREAT SERPL-MCNC: 1.5 MG/DL (ref 0.6–1.2)
EOSINOPHILS ABSOLUTE: 0.2 K/UL (ref 0–0.6)
EOSINOPHILS RELATIVE PERCENT: 3.4 %
GFR AFRICAN AMERICAN: 42
GFR NON-AFRICAN AMERICAN: 35
GLOBULIN: 2.4 G/DL
GLUCOSE BLD-MCNC: 78 MG/DL (ref 70–99)
HCT VFR BLD CALC: 28.3 % (ref 36–48)
HDLC SERPL-MCNC: 68 MG/DL (ref 40–60)
HEMOGLOBIN: 9.2 G/DL (ref 12–16)
LDL CHOLESTEROL CALCULATED: 56 MG/DL
LYMPHOCYTES ABSOLUTE: 2.1 K/UL (ref 1–5.1)
LYMPHOCYTES RELATIVE PERCENT: 39.4 %
MCH RBC QN AUTO: 27.7 PG (ref 26–34)
MCHC RBC AUTO-ENTMCNC: 32.5 G/DL (ref 31–36)
MCV RBC AUTO: 85.2 FL (ref 80–100)
MONOCYTES ABSOLUTE: 0.4 K/UL (ref 0–1.3)
MONOCYTES RELATIVE PERCENT: 7.7 %
NEUTROPHILS ABSOLUTE: 2.6 K/UL (ref 1.7–7.7)
NEUTROPHILS RELATIVE PERCENT: 48.9 %
PDW BLD-RTO: 14.8 % (ref 12.4–15.4)
PLATELET # BLD: 326 K/UL (ref 135–450)
PMV BLD AUTO: 7.5 FL (ref 5–10.5)
POTASSIUM SERPL-SCNC: 4.6 MMOL/L (ref 3.5–5.1)
RBC # BLD: 3.32 M/UL (ref 4–5.2)
SODIUM BLD-SCNC: 142 MMOL/L (ref 136–145)
TOTAL PROTEIN: 6.9 G/DL (ref 6.4–8.2)
TRIGL SERPL-MCNC: 69 MG/DL (ref 0–150)
TSH SERPL DL<=0.05 MIU/L-ACNC: 1.58 UIU/ML (ref 0.27–4.2)
VLDLC SERPL CALC-MCNC: 14 MG/DL
WBC # BLD: 5.3 K/UL (ref 4–11)

## 2021-07-20 PROCEDURE — 99214 OFFICE O/P EST MOD 30 MIN: CPT | Performed by: INTERNAL MEDICINE

## 2021-07-20 SDOH — ECONOMIC STABILITY: FOOD INSECURITY: WITHIN THE PAST 12 MONTHS, YOU WORRIED THAT YOUR FOOD WOULD RUN OUT BEFORE YOU GOT MONEY TO BUY MORE.: NEVER TRUE

## 2021-07-20 SDOH — ECONOMIC STABILITY: FOOD INSECURITY: WITHIN THE PAST 12 MONTHS, THE FOOD YOU BOUGHT JUST DIDN'T LAST AND YOU DIDN'T HAVE MONEY TO GET MORE.: NEVER TRUE

## 2021-07-20 ASSESSMENT — ENCOUNTER SYMPTOMS
DIARRHEA: 0
VOMITING: 0
NAUSEA: 0
RHINORRHEA: 0
CONSTIPATION: 0
BLOOD IN STOOL: 0
SHORTNESS OF BREATH: 0
CHEST TIGHTNESS: 0
SORE THROAT: 0
ABDOMINAL PAIN: 0
SINUS PRESSURE: 0
WHEEZING: 0
COUGH: 0

## 2021-07-20 ASSESSMENT — SOCIAL DETERMINANTS OF HEALTH (SDOH): HOW HARD IS IT FOR YOU TO PAY FOR THE VERY BASICS LIKE FOOD, HOUSING, MEDICAL CARE, AND HEATING?: NOT HARD AT ALL

## 2021-07-20 NOTE — PROGRESS NOTES
Ld Jordan   Date ofBirth:  1955    Date of Visit:  7/20/2021    Chief Complaint   Patient presents with    Diabetes    Hyperlipidemia    Hypertension       HPI  Diabetes Mellitus Type 2 with neuropathy:  Pt has numbness and tingling of both feet. Current Medications: Patient takes Metformin 1000mg po bid and Januvia 100mg po q day for diabetes and gabapentin 100mg po tid for neuropathy  Diet: Patient states she doesn't have an appetite and eats very little only a couple of spoonfuls. Home blood sugar records: Patient monitors her blood sugar fasting and this morning it was 109. Patient states it averages 108-120. Any episodes of hypoglycemia? no  Eye exam current (within one year): yes on 5/19/21  Current exercise: walks 4-5 miles 3- 4 times per week     Hypertension:    Current Medications: Lisinopril 5mg po q day  Home blood pressure monitoring: No  Diet: low salt and no added salt     Hyperlipidemia:  Current medication: Rosuvastatin 20mg po qhs  Patient denies side effects  Diet: low fat, low cholesterol. Weight loss:  Patient states she is starting to lose weight again. Patient states her appetite is decreased. Patient states she can't eat and only eats a couple of bites. Patient states sometimes she can't even eat half a muffin or a fruit cup. Wt Readings from Last 3 Encounters:   07/20/21 109 lb 6.4 oz (49.6 kg)   02/19/21 138 lb (62.6 kg)   10/19/20 123 lb 12.8 oz (56.2 kg)         Review of Systems   Constitutional: Positive for appetite change and unexpected weight change. Negative for activity change, chills, fatigue and fever. HENT: Negative for congestion, postnasal drip, rhinorrhea, sinus pressure, sore throat and trouble swallowing. Eyes: Negative for visual disturbance. Respiratory: Negative for cough, chest tightness, shortness of breath and wheezing. Cardiovascular: Negative for chest pain, palpitations and leg swelling.    Gastrointestinal: Negative for abdominal pain, blood in stool, constipation, diarrhea, nausea and vomiting. Genitourinary: Negative for dysuria, frequency and hematuria. Musculoskeletal: Negative for arthralgias and myalgias. Skin: Negative for rash and wound. Neurological: Positive for numbness. Negative for dizziness, tremors, syncope, weakness, light-headedness and headaches. Psychiatric/Behavioral: Negative for dysphoric mood and sleep disturbance. The patient is not nervous/anxious. No Known Allergies  Outpatient Medications Marked as Taking for the 7/20/21 encounter (Office Visit) with Annie Rosas MD   Medication Sig Dispense Refill    meloxicam (MOBIC) 7.5 MG tablet Take 1 tablet by mouth once daily 30 tablet 0    rosuvastatin (CRESTOR) 20 MG tablet Take 1 tablet by mouth once daily 90 tablet 0    lisinopril (PRINIVIL;ZESTRIL) 5 MG tablet Take 1 tablet by mouth once daily 90 tablet 1    gabapentin (NEURONTIN) 100 MG capsule TAKE 1 CAPSULE BY MOUTH THREE TIMES DAILY 90 capsule 5    metFORMIN (GLUCOPHAGE) 1000 MG tablet TAKE 1 TABLET BY MOUTH TWICE DAILY WITH MEALS 180 tablet 1    JANUVIA 100 MG tablet Take 1 tablet by mouth once daily 90 tablet 1    blood glucose test strips (ONETOUCH ULTRA) strip USE 1 STRIP TO CHECK GLUCOSE TWICE DAILY AS NEEDED 200 each 2    aspirin 81 MG tablet Take 1 tablet by mouth daily 30 tablet 11    Multiple Minerals TABS Take  by mouth. Vitals:    07/20/21 0920   BP: 100/68   Resp: 16   Temp: 97.9 °F (36.6 °C)   Weight: 109 lb 6.4 oz (49.6 kg)     Body mass index is 20.67 kg/m². Physical Exam  Nursing note reviewed. Constitutional:       General: She is not in acute distress. Appearance: Normal appearance. She is well-developed. Eyes:      General: Lids are normal.      Extraocular Movements: Extraocular movements intact. Conjunctiva/sclera: Conjunctivae normal.      Pupils: Pupils are equal, round, and reactive to light.    Neck:      Thyroid: No thyromegaly. Vascular: No carotid bruit. Cardiovascular:      Rate and Rhythm: Normal rate and regular rhythm. Heart sounds: Normal heart sounds, S1 normal and S2 normal. No murmur heard. No friction rub. No gallop. Pulmonary:      Effort: Pulmonary effort is normal. No respiratory distress. Breath sounds: Normal breath sounds. No wheezing, rhonchi or rales. Abdominal:      General: Bowel sounds are normal. There is no distension. Palpations: Abdomen is soft. Tenderness: There is no abdominal tenderness. Musculoskeletal:      Cervical back: Neck supple. Right lower leg: No edema. Left lower leg: No edema. Lymphadenopathy:      Head:      Right side of head: No submandibular adenopathy. Left side of head: No submandibular adenopathy. Neurological:      Mental Status: She is alert and oriented to person, place, and time. Psychiatric:         Mood and Affect: Mood normal.           No results found for this visit on 07/20/21.   Lab Review   Orders Only on 05/19/2021   Component Date Value    Visual Acuity Distance R* 05/19/2021 20/25     Visual Acuity Distance L* 05/19/2021 20/20     Intraocular Pressure Eye 05/19/2021                      Value:20  20      Diabetic Retinopathy 05/19/2021 NEGATIVE     Cataracts 05/19/2021 POSITIVE     Glaucoma 05/19/2021 NEGATIVE    Orders Only on 02/19/2021   Component Date Value    Cholesterol, Total 02/19/2021 175     Triglycerides 02/19/2021 75     HDL 02/19/2021 78*    LDL Calculated 02/19/2021 82     VLDL Cholesterol Calcula* 02/19/2021 15     Sodium 02/19/2021 139     Potassium 02/19/2021 4.5     Chloride 02/19/2021 102     CO2 02/19/2021 27     Anion Gap 02/19/2021 10     Glucose 02/19/2021 181*    BUN 02/19/2021 14     CREATININE 02/19/2021 1.1     GFR Non- 02/19/2021 50*    GFR  02/19/2021 >60     Calcium 02/19/2021 9.8     Total Protein 02/19/2021 7.4     Albumin questions answered. Return in about 4 weeks (around 8/17/2021) for appetite loss and weight loss.

## 2021-07-20 NOTE — PATIENT INSTRUCTIONS
1. Type 2 diabetes mellitus with peripheral neuropathy (HCC)  -Continue same medications  -Limit carbohydrates to 45 grams with meals and 15 grams with snacks  -monitor blood sugars  -goal for blood sugar fasting or pre-meal  is   -goal for blood sugar 2 hours after a meal is less than 180  -goal for blood sugar at bedtime is less than 150  -Regular aerobic exercise  - Comprehensive Metabolic Panel; Future  - Hemoglobin A1C; Future    2. Essential hypertension  -stable  -Continue same medications  -Low sodium diet  -Regular aerobic exercise  - Comprehensive Metabolic Panel; Future    3. Mixed hyperlipidemia  -stable  -Continue same medications  -Low fat, low cholesterol diet  -Regular aerobic exercise  - Comprehensive Metabolic Panel; Future  - Lipid Panel; Future    4. Appetite loss  - CBC Auto Differential; Future  - Comprehensive Metabolic Panel; Future  - TSH without Reflex; Future  - [facility]  - CT ABDOMEN PELVIS W IV CONTRAST Additional Contrast? Radiologist Recommendation; Future  - Boost, Ensure, or Glucerna for diabetics nutritional shakes 2 times daily    5. Weight loss  - CBC Auto Differential; Future  - Comprehensive Metabolic Panel; Future  - TSH without Reflex; Future  - [facility]  - CT ABDOMEN PELVIS W IV CONTRAST Additional Contrast? Radiologist Recommendation;  Future  - Boost, Ensure, or Glucerna for diabetics nutritional shakes 2 times daily

## 2021-07-21 LAB
ESTIMATED AVERAGE GLUCOSE: 114 MG/DL
HBA1C MFR BLD: 5.6 %

## 2021-07-27 ASSESSMENT — ENCOUNTER SYMPTOMS: TROUBLE SWALLOWING: 0

## 2021-08-07 DIAGNOSIS — M25.552 LEFT HIP PAIN: ICD-10-CM

## 2021-08-09 RX ORDER — MELOXICAM 7.5 MG/1
TABLET ORAL
Qty: 30 TABLET | Refills: 0 | OUTPATIENT
Start: 2021-08-09

## 2021-08-09 NOTE — TELEPHONE ENCOUNTER
Medication:   Requested Prescriptions     Pending Prescriptions Disp Refills    meloxicam (MOBIC) 7.5 MG tablet [Pharmacy Med Name: Meloxicam 7.5 MG Oral Tablet] 30 tablet 0     Sig: Take 1 tablet by mouth once daily       Last appt: 7/20/2021   Next appt: 8/19/2021    Last OARRS: No flowsheet data found.

## 2021-08-16 DIAGNOSIS — E11.42 TYPE 2 DIABETES MELLITUS WITH PERIPHERAL NEUROPATHY (HCC): ICD-10-CM

## 2021-08-16 DIAGNOSIS — M25.552 LEFT HIP PAIN: ICD-10-CM

## 2021-08-16 RX ORDER — SITAGLIPTIN 100 MG/1
TABLET, FILM COATED ORAL
Qty: 90 TABLET | Refills: 0 | Status: SHIPPED | OUTPATIENT
Start: 2021-08-16 | End: 2021-08-19 | Stop reason: ALTCHOICE

## 2021-08-16 RX ORDER — MELOXICAM 7.5 MG/1
TABLET ORAL
Qty: 30 TABLET | Refills: 0 | OUTPATIENT
Start: 2021-08-16

## 2021-08-16 NOTE — TELEPHONE ENCOUNTER
Medication:   Requested Prescriptions     Pending Prescriptions Disp Refills    meloxicam (MOBIC) 7.5 MG tablet [Pharmacy Med Name: Meloxicam 7.5 MG Oral Tablet] 30 tablet 0     Sig: Take 1 tablet by mouth once daily    JANUVIA 100 MG tablet [Pharmacy Med Name: Januvia 100 MG Oral Tablet] 90 tablet 0     Sig: Take 1 tablet by mouth once daily       Last Filled:      Patient Phone Number: [phone] (home)     Last appt: 7/20/2021   Next appt: 8/19/2021    Last Labs DM:   Lab Results   Component Value Date    LABA1C 5.6 07/20/2021       Last OARRS: No flowsheet data found.     Preferred Pharmacy:   [facility] [address] MARJORIE JOE [address]  Phone: [phone] Fax: [phone]  [facility], [address] [place] [address] [phone] - f [phone]  [place] [address]  Phone: [phone] Fax: [phone]

## 2021-08-19 ENCOUNTER — OFFICE VISIT (OUTPATIENT)
Dept: PRIMARY CARE CLINIC | Age: 66
End: 2021-08-19
Payer: COMMERCIAL

## 2021-08-19 VITALS
OXYGEN SATURATION: 97 % | TEMPERATURE: 96 F | SYSTOLIC BLOOD PRESSURE: 100 MMHG | BODY MASS INDEX: 20.2 KG/M2 | WEIGHT: 107 LBS | DIASTOLIC BLOOD PRESSURE: 68 MMHG | HEIGHT: 61 IN | RESPIRATION RATE: 14 BRPM | HEART RATE: 76 BPM

## 2021-08-19 DIAGNOSIS — R63.0 APPETITE LOSS: ICD-10-CM

## 2021-08-19 DIAGNOSIS — R63.4 WEIGHT LOSS: Primary | ICD-10-CM

## 2021-08-19 DIAGNOSIS — R94.4 KIDNEY FUNCTION TEST ABNORMAL: ICD-10-CM

## 2021-08-19 DIAGNOSIS — E11.42 TYPE 2 DIABETES MELLITUS WITH PERIPHERAL NEUROPATHY (HCC): ICD-10-CM

## 2021-08-19 DIAGNOSIS — D64.9 ANEMIA, UNSPECIFIED TYPE: ICD-10-CM

## 2021-08-19 PROCEDURE — 99214 OFFICE O/P EST MOD 30 MIN: CPT | Performed by: INTERNAL MEDICINE

## 2021-08-19 RX ORDER — FERROUS SULFATE 325(65) MG
325 TABLET ORAL
Qty: 30 TABLET | Refills: 0 | Status: SHIPPED | OUTPATIENT
Start: 2021-08-19 | End: 2022-08-27 | Stop reason: SDUPTHER

## 2021-08-19 RX ORDER — LACTOSE-REDUCED FOOD/FIBER
LIQUID (ML) ORAL
COMMUNITY
Start: 2021-08-19 | End: 2022-07-27

## 2021-08-19 NOTE — PROGRESS NOTES
Nora Coppola   Date ofBirth:  1955    Date of Visit:  8/19/2021    Chief Complaint   Patient presents with    Weight Loss     4 weeks follow up    Anorexia    Anemia    Diabetes       HPI  Patient still has weight loss. Patient states she weighs herself at home. Patient states she weighs 107 lbs. Patient states she has no appetite. Patient states she doesn't eat a lot. Patient states she eats a little meat. Patient states she is not taking Boost or Ensure. Patient has anemia. Patient states she is scheduled for colonoscopy and EGD for next Thursday 8/26/21. Patient denies nausea, vomiting, abdominal pain, diarrhea, and rectal bleeding. Patient has Type 2 Diabetes. Patient monitors her blood sugar fasting and it averages 118. Patient has decreased Metformin to once daily with breakfast.    Patient's kidney function test were abnormal. Patient has discontinued Meloxicam. Patient has been avoiding NSAID's. Wt Readings from Last 3 Encounters:   08/19/21 107 lb (48.5 kg)   07/20/21 109 lb 6.4 oz (49.6 kg)   02/19/21 138 lb (62.6 kg)       Review of Systems   Constitutional: Positive for appetite change and unexpected weight change. Negative for activity change, chills, fatigue and fever. HENT: Negative for congestion, postnasal drip, rhinorrhea, sinus pressure, sore throat and trouble swallowing. Eyes: Negative for visual disturbance. Respiratory: Negative for cough, chest tightness, shortness of breath and wheezing. Cardiovascular: Negative for chest pain, palpitations and leg swelling. Gastrointestinal: Negative for abdominal pain, blood in stool, constipation, diarrhea, nausea and vomiting. Genitourinary: Negative for dysuria, frequency and hematuria. Musculoskeletal: Negative for arthralgias and myalgias. Skin: Negative for rash and wound. Neurological: Positive for numbness.  Negative for dizziness, tremors, syncope, weakness, light-headedness and headaches. Psychiatric/Behavioral: Negative for dysphoric mood and sleep disturbance. The patient is not nervous/anxious. No Known Allergies  Outpatient Medications Marked as Taking for the 8/19/21 encounter (Office Visit) with Lawyer Kassidy MD   Medication Sig Dispense Refill    JANUVIA 100 MG tablet Take 1 tablet by mouth once daily 90 tablet 0    meloxicam (MOBIC) 7.5 MG tablet Take 1 tablet by mouth once daily 30 tablet 0    rosuvastatin (CRESTOR) 20 MG tablet Take 1 tablet by mouth once daily 90 tablet 0    lisinopril (PRINIVIL;ZESTRIL) 5 MG tablet Take 1 tablet by mouth once daily 90 tablet 1    gabapentin (NEURONTIN) 100 MG capsule TAKE 1 CAPSULE BY MOUTH THREE TIMES DAILY 90 capsule 5    metFORMIN (GLUCOPHAGE) 1000 MG tablet TAKE 1 TABLET BY MOUTH TWICE DAILY WITH MEALS 180 tablet 1    blood glucose test strips (ONETOUCH ULTRA) strip USE 1 STRIP TO CHECK GLUCOSE TWICE DAILY AS NEEDED 200 each 2    aspirin 81 MG tablet Take 1 tablet by mouth daily 30 tablet 11    Multiple Minerals TABS Take  by mouth. Vitals:    08/19/21 0759   BP: 100/68   Site: Right Upper Arm   Position: Sitting   Cuff Size: Medium Adult   Pulse: 76   Resp: 14   Temp: 96 °F (35.6 °C)   SpO2: 97%   Weight: 107 lb (48.5 kg)   Height: 5' 1\" (1.549 m)     Body mass index is 20.22 kg/m². Physical Exam  Nursing note reviewed. Constitutional:       General: She is not in acute distress. Appearance: Normal appearance. She is well-developed. Eyes:      General: Lids are normal.      Extraocular Movements: Extraocular movements intact. Conjunctiva/sclera: Conjunctivae normal.      Pupils: Pupils are equal, round, and reactive to light. Neck:      Thyroid: No thyromegaly. Vascular: No carotid bruit. Cardiovascular:      Rate and Rhythm: Normal rate and regular rhythm. Heart sounds: Normal heart sounds, S1 normal and S2 normal. No murmur heard. No friction rub. No gallop.     Pulmonary: Effort: Pulmonary effort is normal. No respiratory distress. Breath sounds: Normal breath sounds. No wheezing, rhonchi or rales. Abdominal:      General: Bowel sounds are normal. There is no distension. Palpations: Abdomen is soft. Tenderness: There is no abdominal tenderness. Musculoskeletal:      Cervical back: Neck supple. Right lower leg: No edema. Left lower leg: No edema. Lymphadenopathy:      Head:      Right side of head: No submandibular adenopathy. Left side of head: No submandibular adenopathy. Neurological:      Mental Status: She is alert and oriented to person, place, and time. Psychiatric:         Mood and Affect: Mood normal.           No results found for this visit on 08/19/21.   Lab Review   Orders Only on 07/20/2021   Component Date Value    Hemoglobin A1C 07/20/2021 5.6     eAG 07/20/2021 114.0     TSH 07/20/2021 1.58     Cholesterol, Total 07/20/2021 138     Triglycerides 07/20/2021 69     HDL 07/20/2021 68*    LDL Calculated 07/20/2021 56     VLDL Cholesterol Calcula* 07/20/2021 14     Sodium 07/20/2021 142     Potassium 07/20/2021 4.6     Chloride 07/20/2021 106     CO2 07/20/2021 24     Anion Gap 07/20/2021 12     Glucose 07/20/2021 78     BUN 07/20/2021 20     CREATININE 07/20/2021 1.5*    GFR Non- 07/20/2021 35*    GFR  07/20/2021 42*    Calcium 07/20/2021 9.8     Total Protein 07/20/2021 6.9     Albumin 07/20/2021 4.5     Albumin/Globulin Ratio 07/20/2021 1.9     Total Bilirubin 07/20/2021 0.3     Alkaline Phosphatase 07/20/2021 37*    ALT 07/20/2021 17     AST 07/20/2021 34     Globulin 07/20/2021 2.4     WBC 07/20/2021 5.3     RBC 07/20/2021 3.32*    Hemoglobin 07/20/2021 9.2*    Hematocrit 07/20/2021 28.3*    MCV 07/20/2021 85.2     MCH 07/20/2021 27.7     MCHC 07/20/2021 32.5     RDW 07/20/2021 14.8     Platelets 53/36/8840 326     MPV 07/20/2021 7.5     Neutrophils % 07/20/2021 48.9     Lymphocytes % 07/20/2021 39.4     Monocytes % 07/20/2021 7.7     Eosinophils % 07/20/2021 3.4     Basophils % 07/20/2021 0.6     Neutrophils Absolute 07/20/2021 2.6     Lymphocytes Absolute 07/20/2021 2.1     Monocytes Absolute 07/20/2021 0.4     Eosinophils Absolute 07/20/2021 0.2     Basophils Absolute 07/20/2021 0.0    Orders Only on 05/19/2021   Component Date Value    Visual Acuity Distance R* 05/19/2021 20/25     Visual Acuity Distance L* 05/19/2021 20/20     Intraocular Pressure Eye 05/19/2021                      Value:20  20      Diabetic Retinopathy 05/19/2021 NEGATIVE     Cataracts 05/19/2021 POSITIVE     Glaucoma 05/19/2021 NEGATIVE          Assessment/Plan     1. Weight loss  - patient has continued to lose weight  - Nutritional Supplements (BOOST GLUCOSE CONTROL) LIQD; 1 shake 2 times daily  - EGD and colonoscopy scheduled for 8/26/21  - continue to monitor weight    2. Appetite loss  - Nutritional Supplements (BOOST GLUCOSE CONTROL) LIQD; 1 shake 2 times daily    3. Kidney function test abnormal  - Basic Metabolic Panel; Future  -patient has discontinued Meloxicam  - Avoid NSAID's such as otc Ibuprofen, Advil, Motrin, Naprosyn, and Aleve as well as prescription NSAID's    4. Anemia, unspecified type  - CBC; Future  - Iron and TIBC; Future  - Ferritin; Future  - ferrous sulfate (IRON 325) 325 (65 Fe) MG tablet; Take 1 tablet by mouth daily (with breakfast)  Dispense: 30 tablet; Refill: 0  -EGD and colonoscopy scheduled for 8/26/21    5. Type 2 diabetes mellitus with peripheral neuropathy (HCC)  - Hemoglobin A1c of 5.6% is normal  - metFORMIN (GLUCOPHAGE) 1000 MG tablet;  Take 0.5 tablets by mouth daily (with breakfast)  -Continue same medications  -Limit carbohydrates to 45 grams with meals and 15 grams with snacks  -monitor blood sugars  -goal for blood sugar fasting or pre-meal  is   -goal for blood sugar 2 hours after a meal is less than 180  -goal for blood sugar at bedtime is less than 150  -Regular aerobic exercise      Discussed medications with patient, who voiced understanding of their use and indications. All questions answered. Return in about 4 weeks (around 9/16/2021) for weight loss, anemia, and diabetes.

## 2021-08-19 NOTE — PATIENT INSTRUCTIONS
Diagnosis Orders   1. Weight loss  Nutritional Supplements (BOOST GLUCOSE CONTROL) LIQD   2. Appetite loss  Nutritional Supplements (BOOST GLUCOSE CONTROL) LIQD   3. Kidney function test abnormal  Basic Metabolic Panel   4. Anemia, unspecified type  CBC    Iron and TIBC    Ferritin    ferrous sulfate (IRON 325) 325 (65 Fe) MG tablet   5.  Type 2 diabetes mellitus with peripheral neuropathy (HCC)  metFORMIN (GLUCOPHAGE) 1000 MG tablet

## 2021-08-26 DIAGNOSIS — D64.9 ANEMIA, UNSPECIFIED TYPE: ICD-10-CM

## 2021-08-26 DIAGNOSIS — R79.89 ELEVATED FERRITIN: ICD-10-CM

## 2021-08-26 DIAGNOSIS — R94.4 KIDNEY FUNCTION TEST ABNORMAL: Primary | ICD-10-CM

## 2021-08-26 ASSESSMENT — ENCOUNTER SYMPTOMS
RHINORRHEA: 0
VOMITING: 0
CHEST TIGHTNESS: 0
NAUSEA: 0
SORE THROAT: 0
ABDOMINAL PAIN: 0
BLOOD IN STOOL: 0
WHEEZING: 0
SHORTNESS OF BREATH: 0
TROUBLE SWALLOWING: 0
COUGH: 0
DIARRHEA: 0
CONSTIPATION: 0
SINUS PRESSURE: 0

## 2021-09-20 ENCOUNTER — OFFICE VISIT (OUTPATIENT)
Dept: PRIMARY CARE CLINIC | Age: 66
End: 2021-09-20
Payer: COMMERCIAL

## 2021-09-20 VITALS
TEMPERATURE: 97.1 F | DIASTOLIC BLOOD PRESSURE: 70 MMHG | BODY MASS INDEX: 22.48 KG/M2 | WEIGHT: 119 LBS | RESPIRATION RATE: 16 BRPM | HEART RATE: 98 BPM | SYSTOLIC BLOOD PRESSURE: 118 MMHG | OXYGEN SATURATION: 98 %

## 2021-09-20 DIAGNOSIS — D64.9 ANEMIA, UNSPECIFIED TYPE: ICD-10-CM

## 2021-09-20 DIAGNOSIS — E11.42 TYPE 2 DIABETES MELLITUS WITH PERIPHERAL NEUROPATHY (HCC): ICD-10-CM

## 2021-09-20 DIAGNOSIS — R63.0 APPETITE LOSS: ICD-10-CM

## 2021-09-20 DIAGNOSIS — R63.4 WEIGHT LOSS: Primary | ICD-10-CM

## 2021-09-20 DIAGNOSIS — R94.4 KIDNEY FUNCTION TEST ABNORMAL: ICD-10-CM

## 2021-09-20 DIAGNOSIS — Z23 NEED FOR INFLUENZA VACCINATION: ICD-10-CM

## 2021-09-20 PROCEDURE — 90674 CCIIV4 VAC NO PRSV 0.5 ML IM: CPT | Performed by: INTERNAL MEDICINE

## 2021-09-20 PROCEDURE — 99214 OFFICE O/P EST MOD 30 MIN: CPT | Performed by: INTERNAL MEDICINE

## 2021-09-20 PROCEDURE — 90471 IMMUNIZATION ADMIN: CPT | Performed by: INTERNAL MEDICINE

## 2021-09-20 NOTE — PROGRESS NOTES
Vicki Or   Date ofBirth:  1955    Date of Visit:  9/20/2021    Chief Complaint   Patient presents with    Weight Loss    Anemia    Diabetes       HPI  Patient has had weight loss and states she has gained weight since last visit. Patient states her appetite is better. Patient states she is  hungry and eating more. Patient drinks 1-2 Boost nutritional shakes per day. Patient has Anemia. Patient saw Hematology after last visit. Patient is taking iron. Patient has follow up with Hematology next month. Patient has Diabetes. Patient has decreased metformin 500mg to 1/2 tablet daily with breakfast. Patient states her blood sugar this morning was 105 and has been . Patient saw Nephrology for abnormal kidney function tests and states Lisinopril was discontinued. .     Wt Readings from Last 3 Encounters:   09/20/21 119 lb (54 kg)   09/07/21 114 lb 12.8 oz (52.1 kg)   08/19/21 107 lb (48.5 kg)           Review of Systems   Constitutional: Positive for appetite change and unexpected weight change. Negative for activity change, chills, fatigue and fever. HENT: Negative for congestion, postnasal drip, rhinorrhea, sinus pressure, sore throat and trouble swallowing. Eyes: Negative for visual disturbance. Respiratory: Negative for cough, chest tightness, shortness of breath and wheezing. Cardiovascular: Negative for chest pain, palpitations and leg swelling. Gastrointestinal: Negative for abdominal pain, blood in stool, constipation, diarrhea, nausea and vomiting. Genitourinary: Negative for dysuria, frequency and hematuria. Musculoskeletal: Negative for arthralgias and myalgias. Skin: Negative for rash and wound. Neurological: Positive for numbness. Negative for dizziness, tremors, syncope, weakness, light-headedness and headaches. Psychiatric/Behavioral: Negative for dysphoric mood and sleep disturbance. The patient is not nervous/anxious.         No Known Allergies  Outpatient Medications Marked as Taking for the 9/20/21 encounter (Office Visit) with Marcus Joyner MD   Medication Sig Dispense Refill    Nutritional Supplements (BOOST GLUCOSE CONTROL) LIQD 1 shake 2 times daily      ferrous sulfate (IRON 325) 325 (65 Fe) MG tablet Take 1 tablet by mouth daily (with breakfast) 30 tablet 0    metFORMIN (GLUCOPHAGE) 1000 MG tablet Take 0.5 tablets by mouth daily (with breakfast)       rosuvastatin (CRESTOR) 20 MG tablet Take 1 tablet by mouth once daily 90 tablet 0    gabapentin (NEURONTIN) 100 MG capsule TAKE 1 CAPSULE BY MOUTH THREE TIMES DAILY 90 capsule 5    blood glucose test strips (ONETOUCH ULTRA) strip USE 1 STRIP TO CHECK GLUCOSE TWICE DAILY AS NEEDED 200 each 2    aspirin 81 MG tablet Take 1 tablet by mouth daily 30 tablet 11    Multiple Minerals TABS Take  by mouth. Vitals:    09/20/21 1515   BP: 118/70   Pulse: 98   Resp: 16   Temp: 97.1 °F (36.2 °C)   SpO2: 98%   Weight: 119 lb (54 kg)     Body mass index is 22.48 kg/m². Physical Exam  Nursing note reviewed. Constitutional:       General: She is not in acute distress. Appearance: Normal appearance. She is well-developed. Eyes:      General: Lids are normal.      Extraocular Movements: Extraocular movements intact. Conjunctiva/sclera: Conjunctivae normal.      Pupils: Pupils are equal, round, and reactive to light. Neck:      Thyroid: No thyromegaly. Vascular: No carotid bruit. Cardiovascular:      Rate and Rhythm: Normal rate and regular rhythm. Heart sounds: Normal heart sounds, S1 normal and S2 normal. No murmur heard. No friction rub. No gallop. Pulmonary:      Effort: Pulmonary effort is normal. No respiratory distress. Breath sounds: Normal breath sounds. No wheezing, rhonchi or rales. Abdominal:      General: Bowel sounds are normal. There is no distension. Palpations: Abdomen is soft. Tenderness:  There is no abdominal tenderness. Musculoskeletal:      Cervical back: Neck supple. Right lower leg: No edema. Left lower leg: No edema. Lymphadenopathy:      Head:      Right side of head: No submandibular adenopathy. Left side of head: No submandibular adenopathy. Neurological:      Mental Status: She is alert and oriented to person, place, and time. Psychiatric:         Mood and Affect: Mood normal.           No results found for this visit on 09/20/21.   Lab Review   Orders Only on 09/18/2021   Component Date Value    Bacteria, UA 09/18/2021 RARE*    Hyaline Casts, UA 09/18/2021 0     WBC, UA 09/18/2021 13*    RBC, UA 09/18/2021 3     Epithelial Cells, UA 09/18/2021 11*    Urine Type 09/18/2021 Other     KAPPA, FREE LIGHT CHAINS* 09/18/2021 23.41*    LAMBDA, FREE LIGHT CHAIN* 09/18/2021 16.44     K/L RATIO 09/18/2021 1.42     KAPPA/LAMBDA TEST COMMENT 09/18/2021 see below     Sodium 09/18/2021 140     Potassium 09/18/2021 4.2     Chloride 09/18/2021 102     CO2 09/18/2021 25     Anion Gap 09/18/2021 13     Glucose 09/18/2021 104*    BUN 09/18/2021 11     CREATININE 09/18/2021 1.1     GFR Non- 09/18/2021 50*    GFR  09/18/2021 >60     Calcium 09/18/2021 9.3     Phosphorus 09/18/2021 4.1     Albumin 09/18/2021 4.2     Microalbumin, Random Uri* 09/18/2021 10.80*    Creatinine, Ur 09/18/2021 184.4     Microalbumin Creatinine * 09/18/2021 58.6*   Orders Only on 08/19/2021   Component Date Value    Ferritin 08/19/2021 182.3*    Iron 08/19/2021 69     TIBC 08/19/2021 324     Iron Saturation 08/19/2021 21     WBC 08/19/2021 5.9     RBC 08/19/2021 3.28*    Hemoglobin 08/19/2021 9.4*    Hematocrit 08/19/2021 27.9*    MCV 08/19/2021 85.0     MCH 08/19/2021 28.6     MCHC 08/19/2021 33.6     RDW 08/19/2021 13.9     Platelets 78/22/5409 315     MPV 08/19/2021 7.3     Sodium 08/19/2021 141     Potassium 08/19/2021 4.6     Chloride 08/19/2021 103     CO2 08/19/2021 21     Anion Gap 08/19/2021 17*    Glucose 08/19/2021 75     BUN 08/19/2021 12     CREATININE 08/19/2021 1.3*    GFR Non- 08/19/2021 41*    GFR  08/19/2021 50*    Calcium 08/19/2021 9.5    Orders Only on 07/20/2021   Component Date Value    Hemoglobin A1C 07/20/2021 5.6     eAG 07/20/2021 114.0     TSH 07/20/2021 1.58     Cholesterol, Total 07/20/2021 138     Triglycerides 07/20/2021 69     HDL 07/20/2021 68*    LDL Calculated 07/20/2021 56     VLDL Cholesterol Calcula* 07/20/2021 14     Sodium 07/20/2021 142     Potassium 07/20/2021 4.6     Chloride 07/20/2021 106     CO2 07/20/2021 24     Anion Gap 07/20/2021 12     Glucose 07/20/2021 78     BUN 07/20/2021 20     CREATININE 07/20/2021 1.5*    GFR Non- 07/20/2021 35*    GFR  07/20/2021 42*    Calcium 07/20/2021 9.8     Total Protein 07/20/2021 6.9     Albumin 07/20/2021 4.5     Albumin/Globulin Ratio 07/20/2021 1.9     Total Bilirubin 07/20/2021 0.3     Alkaline Phosphatase 07/20/2021 37*    ALT 07/20/2021 17     AST 07/20/2021 34     Globulin 07/20/2021 2.4     WBC 07/20/2021 5.3     RBC 07/20/2021 3.32*    Hemoglobin 07/20/2021 9.2*    Hematocrit 07/20/2021 28.3*    MCV 07/20/2021 85.2     MCH 07/20/2021 27.7     MCHC 07/20/2021 32.5     RDW 07/20/2021 14.8     Platelets 02/15/7567 326     MPV 07/20/2021 7.5     Neutrophils % 07/20/2021 48.9     Lymphocytes % 07/20/2021 39.4     Monocytes % 07/20/2021 7.7     Eosinophils % 07/20/2021 3.4     Basophils % 07/20/2021 0.6     Neutrophils Absolute 07/20/2021 2.6     Lymphocytes Absolute 07/20/2021 2.1     Monocytes Absolute 07/20/2021 0.4     Eosinophils Absolute 07/20/2021 0.2     Basophils Absolute 07/20/2021 0.0    Orders Only on 05/19/2021   Component Date Value    Visual Acuity Distance R* 05/19/2021 20/25     Visual Acuity Distance L* 05/19/2021 20/20     Intraocular Pressure Eye 05/19/2021                      Value:20  20      Diabetic Retinopathy 05/19/2021 NEGATIVE     Cataracts 05/19/2021 POSITIVE     Glaucoma 05/19/2021 NEGATIVE          Assessment/Plan     1. Weight loss  -improved  -weight has increased since last visit  -continue Boost nutritional shakes 1-2 times per day  -monitor weight    2. Appetite loss  -appetite is better and weight loss has improved  -continue Boost nutritional shakes 1-2 times per da    3. Type 2 diabetes mellitus with peripheral neuropathy (HCC)  -stable   -most recent Hemoglobin A1c of 5.6% is normal  -patient has decreased Metformin 500mg to 1/2 tablet daily  -Continue same medications  -Limit carbohydrates to 45 grams with meals and 15 grams with snacks  -monitor blood sugars  -goal for blood sugar fasting or pre-meal  is   -goal for blood sugar 2 hours after a meal is less than 180  -goal for blood sugar at bedtime is less than 150  -Regular aerobic exercise    4. Anemia, unspecified type  -continue ferrous sulfate  -continue care per Hematology Oncology    5. Need for influenza vaccination  - INFLUENZA, MDCK QUADV, 2 YRS AND OLDER, IM, PF, PREFILL SYR OR SDV, 0.5ML (FLUCELVAX QUADV, PF) given    6. Kidney function test abnormal  -patient has seen Nephrology  -Lisinopril discontinued   -patient is avoiding NSAID's        Discussed medications with patient, who voiced understanding of their use and indications. All questions answered. Return in about 6 weeks (around 11/1/2021) for weight check and diabetes.

## 2021-09-20 NOTE — PATIENT INSTRUCTIONS
-Continue same medications  -Continue Boost nutritional shakes  -continue care per Nephrology  -continue care per Hematology-Oncology      Patient Education        Influenza (Flu) Vaccine (Inactivated or Recombinant): What You Need to Know  Why get vaccinated? Influenza vaccine can prevent influenza (flu). Flu is a contagious disease that spreads around the [place] every year, usually between October and May. Anyone can get the flu, but it is more dangerous for some people. Infants and young children, people 72years of age and older, pregnant women, and people with certain health conditions or a weakened immune system are at greatest risk of flu complications. Pneumonia, bronchitis, sinus infections and ear infections are examples of flu-related complications. If you have a medical condition, such as heart disease, cancer or diabetes, flu can make it worse. Flu can cause fever and chills, sore throat, muscle aches, fatigue, cough, headache, and runny or stuffy nose. Some people may have vomiting and diarrhea, though this is more common in children than adults. Each year, thousands of people in the [facility] die from flu, and many more are hospitalized. Flu vaccine prevents millions of illnesses and flu-related visits to the doctor each year. Influenza vaccine  CDC recommends everyone 10months of age and older get vaccinated every flu season. Children 6 months through 6years of age may need 2 doses during a single flu season. Everyone else needs only 1 dose each flu season. It takes about 2 weeks for protection to develop after vaccination. There are many flu viruses, and they are always changing. Each year a new flu vaccine is made to protect against three or four viruses that are likely to cause disease in the upcoming flu season. Even when the vaccine doesn't exactly match these viruses, it may still provide some protection. Influenza vaccine does not cause flu.   Influenza vaccine may be given at the same time as other vaccines. Talk with your health care provider  Tell your vaccine provider if the person getting the vaccine:  · Has had an allergic reaction after a previous dose of influenza vaccine, or has any severe, life-threatening allergies. · Has ever had Guillain-Barré Syndrome (also called GBS). In some cases, your health care provider may decide to postpone influenza vaccination to a future visit. People with minor illnesses, such as a cold, may be vaccinated. People who are moderately or severely ill should usually wait until they recover before getting influenza vaccine. Your health care provider can give you more information. Risks of a vaccine reaction  · Soreness, redness, and swelling where shot is given, fever, muscle aches, and headache can happen after influenza vaccine. · There may be a very small increased risk of Guillain-Barré Syndrome (GBS) after inactivated influenza vaccine (the flu shot). Pasha Mantis children who get the flu shot along with pneumococcal vaccine (PCV13), and/or DTaP vaccine at the same time might be slightly more likely to have a seizure caused by fever. Tell your health care provider if a child who is getting flu vaccine has ever had a seizure. People sometimes faint after medical procedures, including vaccination. Tell your provider if you feel dizzy or have vision changes or ringing in the ears. As with any medicine, there is a very remote chance of a vaccine causing a severe allergic reaction, other serious injury, or death. What if there is a serious problem? An allergic reaction could occur after the vaccinated person leaves the clinic. If you see signs of a severe allergic reaction (hives, swelling of the face and throat, difficulty breathing, a fast heartbeat, dizziness, or weakness), call 9-1-1 and get the person to the nearest hospital.  For other signs that concern you, call your health care provider.   Adverse reactions should be reported to the Vaccine Adverse Event Reporting System (VAERS). Your health care provider will usually file this report, or you can do it yourself. Visit the VAERS website at www.vaers. hhs.gov or call 0-940.146.8312. VAERS is only for reporting reactions, and VAERS staff do not give medical advice. The National Vaccine Injury Compensation Program  The National Vaccine Injury Compensation Program (VICP) is a federal program that was created to compensate people who may have been injured by certain vaccines. Visit the VICP website at www.hrsa.gov/vaccinecompensation or call 0-391.942.4214 to learn about the program and about filing a claim. There is a time limit to file a claim for compensation. How can I learn more? · Ask your healthcare provider. · Call your local or state health department. · Contact the Centers for Disease Control and Prevention (CDC):  ? Call 6-584.949.8599 (1-800-CDC-INFO) or  ? Visit CDC's website at www.cdc.gov/flu  Vaccine Information Statement (Interim)  Inactivated Influenza Vaccine  8/15/2019  44 KELLI ParryShelby Ran [id number]  Department of Health and Human Services  Centers for Disease Control and Prevention  Many Vaccine Information Statements are available in [place] and other languages. See www.immunize.org/vis. Muchas hojas de información sobre vacunas están disponibles en español y en otros idiomas. Visite www.immunize.org/vis. Care instructions adapted under license by [facility] ([facility]). If you have questions about a medical condition or this instruction, always ask your healthcare professional. [place] [address] any warranty or liability for your use of this information.

## 2021-09-24 DIAGNOSIS — E78.2 MIXED HYPERLIPIDEMIA: ICD-10-CM

## 2021-09-24 DIAGNOSIS — E11.42 TYPE 2 DIABETES MELLITUS WITH PERIPHERAL NEUROPATHY (HCC): ICD-10-CM

## 2021-09-24 DIAGNOSIS — I10 ESSENTIAL HYPERTENSION: ICD-10-CM

## 2021-09-24 NOTE — TELEPHONE ENCOUNTER
Medication:   Requested Prescriptions     Pending Prescriptions Disp Refills    lisinopril (PRINIVIL;ZESTRIL) 5 MG tablet [Pharmacy Med Name: Lisinopril 5 MG Oral Tablet] 90 tablet 0     Sig: Take 1 tablet by mouth once daily    metFORMIN (GLUCOPHAGE) 1000 MG tablet [Pharmacy Med Name: metFORMIN HCl 1000 MG Oral Tablet] 180 tablet 0     Sig: TAKE 1 TABLET BY MOUTH TWICE DAILY WITH MEALS    rosuvastatin (CRESTOR) 20 MG tablet [Pharmacy Med Name: Rosuvastatin Calcium 20 MG Oral Tablet] 90 tablet 0     Sig: Take 1 tablet by mouth once daily       Last appt: 9/20/2021   Next appt: 11/1/2021    Last Labs DM:   Lab Results   Component Value Date    LABA1C 5.6 07/20/2021     Last Lipid:   Lab Results   Component Value Date    CHOL 138 07/20/2021    TRIG 69 07/20/2021    HDL 68 07/20/2021    HDL 63 02/14/2012    LDLCALC 56 07/20/2021     Last PSA: No results found for: PSA  Last Thyroid:   Lab Results   Component Value Date    TSH 1.58 07/20/2021    T4FREE 1.1 05/06/2019

## 2021-09-25 RX ORDER — ROSUVASTATIN CALCIUM 20 MG/1
TABLET, COATED ORAL
Qty: 90 TABLET | Refills: 1 | Status: SHIPPED | OUTPATIENT
Start: 2021-09-25 | End: 2022-07-27 | Stop reason: SDUPTHER

## 2021-09-25 RX ORDER — LISINOPRIL 5 MG/1
TABLET ORAL
Qty: 90 TABLET | Refills: 1 | Status: SHIPPED | OUTPATIENT
Start: 2021-09-25 | End: 2022-07-27 | Stop reason: SDUPTHER

## 2021-09-27 ASSESSMENT — ENCOUNTER SYMPTOMS
COUGH: 0
CONSTIPATION: 0
WHEEZING: 0
NAUSEA: 0
RHINORRHEA: 0
ABDOMINAL PAIN: 0
SORE THROAT: 0
TROUBLE SWALLOWING: 0
SHORTNESS OF BREATH: 0
DIARRHEA: 0
VOMITING: 0
CHEST TIGHTNESS: 0
BLOOD IN STOOL: 0
SINUS PRESSURE: 0

## 2021-12-06 ENCOUNTER — OFFICE VISIT (OUTPATIENT)
Dept: PRIMARY CARE CLINIC | Age: 66
End: 2021-12-06
Payer: COMMERCIAL

## 2021-12-06 VITALS
BODY MASS INDEX: 23.92 KG/M2 | WEIGHT: 126.6 LBS | OXYGEN SATURATION: 99 % | RESPIRATION RATE: 16 BRPM | SYSTOLIC BLOOD PRESSURE: 124 MMHG | TEMPERATURE: 97.6 F | DIASTOLIC BLOOD PRESSURE: 74 MMHG | HEART RATE: 77 BPM

## 2021-12-06 DIAGNOSIS — E11.42 TYPE 2 DIABETES MELLITUS WITH PERIPHERAL NEUROPATHY (HCC): Primary | ICD-10-CM

## 2021-12-06 DIAGNOSIS — R63.4 WEIGHT LOSS: ICD-10-CM

## 2021-12-06 LAB — HBA1C MFR BLD: 7.2 %

## 2021-12-06 PROCEDURE — 83036 HEMOGLOBIN GLYCOSYLATED A1C: CPT | Performed by: INTERNAL MEDICINE

## 2021-12-06 PROCEDURE — 3051F HG A1C>EQUAL 7.0%<8.0%: CPT | Performed by: INTERNAL MEDICINE

## 2021-12-06 PROCEDURE — 99213 OFFICE O/P EST LOW 20 MIN: CPT | Performed by: INTERNAL MEDICINE

## 2021-12-06 ASSESSMENT — ENCOUNTER SYMPTOMS
ABDOMINAL PAIN: 0
VOMITING: 0
SHORTNESS OF BREATH: 0
NAUSEA: 0

## 2021-12-06 NOTE — PATIENT INSTRUCTIONS
1. Type 2 diabetes mellitus with peripheral neuropathy (HCC)  - Hemoglobin A1c of 7.2% shows diabetes needs a little better control  - POCT glycosylated hemoglobin (Hb A1C)  -Change metFORMIN (GLUCOPHAGE) to 500 MG tablet; Take 1 tablet by mouth 2 times daily (with meals)  Dispense: 60 tablet; Refill: 3  -Resume Gabapentin 100mg nightly for neuropathy  -Limit carbohydrates to 45 grams with meals and 15 grams with snacks  -monitor blood sugars  -goal for blood sugar fasting or pre-meal  is   -goal for blood sugar 2 hours after a meal is less than 180  -goal for blood sugar at bedtime is less than 150  -Regular aerobic exercise    2.  Weight loss  -resolved  -patient has gained 9 lbs since last visit  -continue to work on a better diet

## 2021-12-06 NOTE — PROGRESS NOTES
Grayson Hays   Date ofBirth:  1955    Date of Visit:  12/6/2021    Chief Complaint   Patient presents with    Diabetes    Weight Loss       HPI    Patient has Type 2 Diabetes with neuropathy. Patient states her foot numbness was better and restarted 10 days ago. Patient takes Metformin 1000mg 1/2 tablet daily. Patient states she has gabapentin at home but hasn't been taking it. Patient states she doesn't eat a lot because she doesn't have an appetite. Patient hasn't been decreasing carbohydrates. Patient monitors her blood sugar daily and states it is under 125 first thing in the mornings. Patient walks 3-4 miles 4 times per week if weather is ok. Patient states she hasn't noticed any weight gain. Patient's  states he has noticed patient has gained weight and is eating more. Patient states she doesn't drink the nutritional shakes regularly anymore. Patient states today is not a good day. Patient states she lost her oldest sister last week. Review of Systems   Constitutional: Positive for unexpected weight change. Negative for fatigue. Eyes: Negative for visual disturbance. Respiratory: Negative for shortness of breath. Cardiovascular: Negative for chest pain. Gastrointestinal: Negative for abdominal pain, nausea and vomiting. Genitourinary: Negative for dysuria and frequency. Skin: Negative for wound. Neurological: Positive for numbness. Negative for dizziness, light-headedness and headaches.        No Known Allergies  Outpatient Medications Marked as Taking for the 12/6/21 encounter (Office Visit) with Abril Schuler MD   Medication Sig Dispense Refill    lisinopril (PRINIVIL;ZESTRIL) 5 MG tablet Take 1 tablet by mouth once daily 90 tablet 1    rosuvastatin (CRESTOR) 20 MG tablet Take 1 tablet by mouth once daily 90 tablet 1    metFORMIN (GLUCOPHAGE) 1000 MG tablet Take 0.5 tablets by mouth daily (with breakfast) 45 tablet 1    Nutritional Supplements (BOOST GLUCOSE CONTROL) LIQD 1 shake 2 times daily      ferrous sulfate (IRON 325) 325 (65 Fe) MG tablet Take 1 tablet by mouth daily (with breakfast) 30 tablet 0    blood glucose test strips (ONETOUCH ULTRA) strip USE 1 STRIP TO CHECK GLUCOSE TWICE DAILY AS NEEDED 200 each 2    aspirin 81 MG tablet Take 1 tablet by mouth daily 30 tablet 11    Multiple Minerals TABS Take  by mouth. Vitals:    12/06/21 1339   BP: 124/74   Pulse: 77   Resp: 16   Temp: 97.6 °F (36.4 °C)   SpO2: 99%   Weight: 126 lb 9.6 oz (57.4 kg)     Body mass index is 23.92 kg/m². Physical Exam  Nursing note reviewed. Constitutional:       General: She is not in acute distress. Appearance: Normal appearance. She is well-developed. Eyes:      Extraocular Movements: Extraocular movements intact. Pupils: Pupils are equal, round, and reactive to light. Neck:      Thyroid: No thyromegaly. Vascular: No carotid bruit. Cardiovascular:      Rate and Rhythm: Normal rate and regular rhythm. Heart sounds: Normal heart sounds, S1 normal and S2 normal. No murmur heard. No friction rub. No gallop. Pulmonary:      Effort: Pulmonary effort is normal. No respiratory distress. Breath sounds: Normal breath sounds. No wheezing. Abdominal:      General: Bowel sounds are normal. There is no distension. Palpations: Abdomen is soft. Tenderness: There is no abdominal tenderness. Musculoskeletal:      Cervical back: Neck supple. Right lower leg: No edema. Left lower leg: No edema. Neurological:      Mental Status: She is alert.            Results for POC orders placed in visit on 12/06/21   POCT glycosylated hemoglobin (Hb A1C)   Result Value Ref Range    Hemoglobin A1C 7.2 %     Lab Review   Orders Only on 09/18/2021   Component Date Value    Bacteria, UA 09/18/2021 RARE*    Hyaline Casts, UA 09/18/2021 0     WBC, UA 09/18/2021 13*    RBC, UA 09/18/2021 3     Epithelial Cells, UA 09/18/2021 11*    Urine Type 09/18/2021 Other     KAPPA, FREE LIGHT CHAINS* 09/18/2021 23.41*    LAMBDA, FREE LIGHT CHAIN* 09/18/2021 16.44     K/L RATIO 09/18/2021 1.42     KAPPA/LAMBDA TEST COMMENT 09/18/2021 see below     Sodium 09/18/2021 140     Potassium 09/18/2021 4.2     Chloride 09/18/2021 102     CO2 09/18/2021 25     Anion Gap 09/18/2021 13     Glucose 09/18/2021 104*    BUN 09/18/2021 11     CREATININE 09/18/2021 1.1     GFR Non- 09/18/2021 50*    GFR  09/18/2021 >60     Calcium 09/18/2021 9.3     Phosphorus 09/18/2021 4.1     Albumin 09/18/2021 4.2     Microalbumin, Random Uri* 09/18/2021 10.80*    Creatinine, Ur 09/18/2021 184.4     Microalbumin Creatinine * 09/18/2021 58.6*   Orders Only on 08/19/2021   Component Date Value    Ferritin 08/19/2021 182.3*    Iron 08/19/2021 69     TIBC 08/19/2021 324     Iron Saturation 08/19/2021 21     WBC 08/19/2021 5.9     RBC 08/19/2021 3.28*    Hemoglobin 08/19/2021 9.4*    Hematocrit 08/19/2021 27.9*    MCV 08/19/2021 85.0     MCH 08/19/2021 28.6     MCHC 08/19/2021 33.6     RDW 08/19/2021 13.9     Platelets 96/89/9288 315     MPV 08/19/2021 7.3     Sodium 08/19/2021 141     Potassium 08/19/2021 4.6     Chloride 08/19/2021 103     CO2 08/19/2021 21     Anion Gap 08/19/2021 17*    Glucose 08/19/2021 75     BUN 08/19/2021 12     CREATININE 08/19/2021 1.3*    GFR Non- 08/19/2021 41*    GFR  08/19/2021 50*    Calcium 08/19/2021 9.5    Orders Only on 07/20/2021   Component Date Value    Hemoglobin A1C 07/20/2021 5.6     eAG 07/20/2021 114.0     TSH 07/20/2021 1.58     Cholesterol, Total 07/20/2021 138     Triglycerides 07/20/2021 69     HDL 07/20/2021 68*    LDL Calculated 07/20/2021 56     VLDL Cholesterol Calcula* 07/20/2021 14     Sodium 07/20/2021 142     Potassium 07/20/2021 4.6     Chloride 07/20/2021 106     CO2 07/20/2021 24     Anion Gap 07/20/2021 12     Glucose 07/20/2021 78     BUN 07/20/2021 20     CREATININE 07/20/2021 1.5*    GFR Non- 07/20/2021 35*    GFR  07/20/2021 42*    Calcium 07/20/2021 9.8     Total Protein 07/20/2021 6.9     Albumin 07/20/2021 4.5     Albumin/Globulin Ratio 07/20/2021 1.9     Total Bilirubin 07/20/2021 0.3     Alkaline Phosphatase 07/20/2021 37*    ALT 07/20/2021 17     AST 07/20/2021 34     Globulin 07/20/2021 2.4     WBC 07/20/2021 5.3     RBC 07/20/2021 3.32*    Hemoglobin 07/20/2021 9.2*    Hematocrit 07/20/2021 28.3*    MCV 07/20/2021 85.2     MCH 07/20/2021 27.7     MCHC 07/20/2021 32.5     RDW 07/20/2021 14.8     Platelets 73/81/9733 326     MPV 07/20/2021 7.5     Neutrophils % 07/20/2021 48.9     Lymphocytes % 07/20/2021 39.4     Monocytes % 07/20/2021 7.7     Eosinophils % 07/20/2021 3.4     Basophils % 07/20/2021 0.6     Neutrophils Absolute 07/20/2021 2.6     Lymphocytes Absolute 07/20/2021 2.1     Monocytes Absolute 07/20/2021 0.4     Eosinophils Absolute 07/20/2021 0.2     Basophils Absolute 07/20/2021 0.0          Assessment/Plan     1. Type 2 diabetes mellitus with peripheral neuropathy (HCC)  - Hemoglobin A1c of 7.2% shows diabetes needs a little better control  - POCT glycosylated hemoglobin (Hb A1C)  -Change metFORMIN (GLUCOPHAGE) to 500 MG tablet; Take 1 tablet by mouth 2 times daily (with meals)  Dispense: 60 tablet; Refill: 3  -Resume Gabapentin 100mg nightly for neuropathy  -Limit carbohydrates to 45 grams with meals and 15 grams with snacks  -monitor blood sugars  -goal for blood sugar fasting or pre-meal  is   -goal for blood sugar 2 hours after a meal is less than 180  -goal for blood sugar at bedtime is less than 150  -Regular aerobic exercise    2.  Weight loss  -resolved  -patient has gained 9 lbs since last visit  -continue to work on a better diet      Discussed medications with patient, who voiced understanding of their use and indications. All questions answered. Return in about 3 months (around 3/6/2022) for diabetes, neuropathy, hypertension, and hyperlipidemia.

## 2021-12-20 DIAGNOSIS — E11.42 TYPE 2 DIABETES MELLITUS WITH PERIPHERAL NEUROPATHY (HCC): ICD-10-CM

## 2021-12-21 RX ORDER — SITAGLIPTIN 100 MG/1
TABLET, FILM COATED ORAL
Qty: 90 TABLET | Refills: 0 | OUTPATIENT
Start: 2021-12-21

## 2022-05-20 LAB — DIABETIC RETINOPATHY: NEGATIVE

## 2022-07-27 ENCOUNTER — OFFICE VISIT (OUTPATIENT)
Dept: PRIMARY CARE CLINIC | Age: 67
End: 2022-07-27
Payer: COMMERCIAL

## 2022-07-27 VITALS
WEIGHT: 126.6 LBS | HEIGHT: 61 IN | OXYGEN SATURATION: 98 % | BODY MASS INDEX: 23.9 KG/M2 | DIASTOLIC BLOOD PRESSURE: 76 MMHG | RESPIRATION RATE: 16 BRPM | SYSTOLIC BLOOD PRESSURE: 126 MMHG | HEART RATE: 76 BPM | TEMPERATURE: 97.6 F

## 2022-07-27 DIAGNOSIS — Z00.00 ENCOUNTER FOR WELL ADULT EXAM WITHOUT ABNORMAL FINDINGS: Primary | ICD-10-CM

## 2022-07-27 DIAGNOSIS — D64.9 ANEMIA, UNSPECIFIED TYPE: ICD-10-CM

## 2022-07-27 DIAGNOSIS — E11.42 TYPE 2 DIABETES MELLITUS WITH PERIPHERAL NEUROPATHY (HCC): ICD-10-CM

## 2022-07-27 DIAGNOSIS — I10 ESSENTIAL HYPERTENSION: ICD-10-CM

## 2022-07-27 DIAGNOSIS — E78.2 MIXED HYPERLIPIDEMIA: ICD-10-CM

## 2022-07-27 DIAGNOSIS — Z00.00 ENCOUNTER FOR WELL ADULT EXAM WITHOUT ABNORMAL FINDINGS: ICD-10-CM

## 2022-07-27 LAB
A/G RATIO: 1.3 (ref 1.1–2.2)
ALBUMIN SERPL-MCNC: 4.4 G/DL (ref 3.4–5)
ALP BLD-CCNC: 68 U/L (ref 40–129)
ALT SERPL-CCNC: 17 U/L (ref 10–40)
ANION GAP SERPL CALCULATED.3IONS-SCNC: 14 MMOL/L (ref 3–16)
AST SERPL-CCNC: 36 U/L (ref 15–37)
BASOPHILS ABSOLUTE: 0 K/UL (ref 0–0.2)
BASOPHILS RELATIVE PERCENT: 0.7 %
BILIRUB SERPL-MCNC: 0.3 MG/DL (ref 0–1)
BUN BLDV-MCNC: 9 MG/DL (ref 7–20)
CALCIUM SERPL-MCNC: 10.3 MG/DL (ref 8.3–10.6)
CHLORIDE BLD-SCNC: 101 MMOL/L (ref 99–110)
CHOLESTEROL, TOTAL: 261 MG/DL (ref 0–199)
CO2: 25 MMOL/L (ref 21–32)
CREAT SERPL-MCNC: 1.2 MG/DL (ref 0.6–1.2)
EOSINOPHILS ABSOLUTE: 0.1 K/UL (ref 0–0.6)
EOSINOPHILS RELATIVE PERCENT: 2.1 %
FERRITIN: 32 NG/ML (ref 15–150)
GFR AFRICAN AMERICAN: 55
GFR NON-AFRICAN AMERICAN: 45
GLUCOSE BLD-MCNC: 195 MG/DL (ref 70–99)
HBA1C MFR BLD: 9.4 %
HCT VFR BLD CALC: 33.2 % (ref 36–48)
HDLC SERPL-MCNC: 83 MG/DL (ref 40–60)
HEMOGLOBIN: 11 G/DL (ref 12–16)
IRON SATURATION: 16 % (ref 15–50)
IRON: 70 UG/DL (ref 37–145)
LDL CHOLESTEROL CALCULATED: 153 MG/DL
LYMPHOCYTES ABSOLUTE: 2 K/UL (ref 1–5.1)
LYMPHOCYTES RELATIVE PERCENT: 29.3 %
MCH RBC QN AUTO: 28.2 PG (ref 26–34)
MCHC RBC AUTO-ENTMCNC: 33 G/DL (ref 31–36)
MCV RBC AUTO: 85.5 FL (ref 80–100)
MONOCYTES ABSOLUTE: 0.5 K/UL (ref 0–1.3)
MONOCYTES RELATIVE PERCENT: 6.9 %
NEUTROPHILS ABSOLUTE: 4.1 K/UL (ref 1.7–7.7)
NEUTROPHILS RELATIVE PERCENT: 61 %
PDW BLD-RTO: 14.1 % (ref 12.4–15.4)
PLATELET # BLD: 284 K/UL (ref 135–450)
PMV BLD AUTO: 7.3 FL (ref 5–10.5)
POTASSIUM SERPL-SCNC: 3.9 MMOL/L (ref 3.5–5.1)
RBC # BLD: 3.88 M/UL (ref 4–5.2)
SODIUM BLD-SCNC: 140 MMOL/L (ref 136–145)
TOTAL IRON BINDING CAPACITY: 430 UG/DL (ref 260–445)
TOTAL PROTEIN: 7.8 G/DL (ref 6.4–8.2)
TRIGL SERPL-MCNC: 123 MG/DL (ref 0–150)
TSH SERPL DL<=0.05 MIU/L-ACNC: 1.64 UIU/ML (ref 0.27–4.2)
VLDLC SERPL CALC-MCNC: 25 MG/DL
WBC # BLD: 6.7 K/UL (ref 4–11)

## 2022-07-27 PROCEDURE — 83036 HEMOGLOBIN GLYCOSYLATED A1C: CPT | Performed by: INTERNAL MEDICINE

## 2022-07-27 PROCEDURE — 99396 PREV VISIT EST AGE 40-64: CPT | Performed by: INTERNAL MEDICINE

## 2022-07-27 RX ORDER — LISINOPRIL 5 MG/1
TABLET ORAL
Qty: 90 TABLET | Refills: 1 | Status: SHIPPED | OUTPATIENT
Start: 2022-07-27

## 2022-07-27 RX ORDER — ROSUVASTATIN CALCIUM 20 MG/1
TABLET, COATED ORAL
Qty: 90 TABLET | Refills: 1 | Status: SHIPPED | OUTPATIENT
Start: 2022-07-27

## 2022-07-27 SDOH — ECONOMIC STABILITY: FOOD INSECURITY: WITHIN THE PAST 12 MONTHS, YOU WORRIED THAT YOUR FOOD WOULD RUN OUT BEFORE YOU GOT MONEY TO BUY MORE.: NEVER TRUE

## 2022-07-27 SDOH — ECONOMIC STABILITY: FOOD INSECURITY: WITHIN THE PAST 12 MONTHS, THE FOOD YOU BOUGHT JUST DIDN'T LAST AND YOU DIDN'T HAVE MONEY TO GET MORE.: NEVER TRUE

## 2022-07-27 ASSESSMENT — PATIENT HEALTH QUESTIONNAIRE - PHQ9
SUM OF ALL RESPONSES TO PHQ QUESTIONS 1-9: 0
6. FEELING BAD ABOUT YOURSELF - OR THAT YOU ARE A FAILURE OR HAVE LET YOURSELF OR YOUR FAMILY DOWN: 0
SUM OF ALL RESPONSES TO PHQ QUESTIONS 1-9: 0
SUM OF ALL RESPONSES TO PHQ9 QUESTIONS 1 & 2: 0
9. THOUGHTS THAT YOU WOULD BE BETTER OFF DEAD, OR OF HURTING YOURSELF: 0
2. FEELING DOWN, DEPRESSED OR HOPELESS: 0
5. POOR APPETITE OR OVEREATING: 0
10. IF YOU CHECKED OFF ANY PROBLEMS, HOW DIFFICULT HAVE THESE PROBLEMS MADE IT FOR YOU TO DO YOUR WORK, TAKE CARE OF THINGS AT HOME, OR GET ALONG WITH OTHER PEOPLE: 0
SUM OF ALL RESPONSES TO PHQ QUESTIONS 1-9: 0
1. LITTLE INTEREST OR PLEASURE IN DOING THINGS: 0
4. FEELING TIRED OR HAVING LITTLE ENERGY: 0
SUM OF ALL RESPONSES TO PHQ QUESTIONS 1-9: 0
7. TROUBLE CONCENTRATING ON THINGS, SUCH AS READING THE NEWSPAPER OR WATCHING TELEVISION: 0
3. TROUBLE FALLING OR STAYING ASLEEP: 0
8. MOVING OR SPEAKING SO SLOWLY THAT OTHER PEOPLE COULD HAVE NOTICED. OR THE OPPOSITE, BEING SO FIGETY OR RESTLESS THAT YOU HAVE BEEN MOVING AROUND A LOT MORE THAN USUAL: 0

## 2022-07-27 ASSESSMENT — SOCIAL DETERMINANTS OF HEALTH (SDOH): HOW HARD IS IT FOR YOU TO PAY FOR THE VERY BASICS LIKE FOOD, HOUSING, MEDICAL CARE, AND HEATING?: NOT HARD AT ALL

## 2022-07-27 NOTE — PATIENT INSTRUCTIONS
Well Visit, Women 48 to 72: Care Instructions  Overview     Well visits can help you stay healthy. Your doctor has checked your overall health and may have suggested ways to take good care of yourself. Your doctor also may have recommended tests. At home, you can help prevent illness withhealthy eating, regular exercise, and other steps. Follow-up care is a key part of your treatment and safety. Be sure to make and go to all appointments, and call your doctor if you are having problems. It's also a good idea to know your test results and keep alist of the medicines you take. How can you care for yourself at home? Get screening tests that you and your doctor decide on. Screening helps find diseases before any symptoms appear. Eat healthy foods. Choose fruits, vegetables, whole grains, protein, and low-fat dairy foods. Limit fat, especially saturated fat. Reduce salt in your diet. Limit alcohol. Have no more than 1 drink a day or 7 drinks a week. Get at least 30 minutes of exercise on most days of the week. Walking is a good choice. You also may want to do other activities, such as running, swimming, cycling, or playing tennis or team sports. Reach and stay at a healthy weight. This will lower your risk for many problems, such as obesity, diabetes, heart disease, and high blood pressure. Do not smoke. Smoking can make health problems worse. If you need help quitting, talk to your doctor about stop-smoking programs and medicines. These can increase your chances of quitting for good. Care for your mental health. It is easy to get weighed down by worry and stress. Learn strategies to manage stress, like deep breathing and mindfulness, and stay connected with your family and community. If you find you often feel sad or hopeless, talk with your doctor. Treatment can help. Talk to your doctor about whether you have any risk factors for sexually transmitted infections (STIs).  You can help prevent STIs if you wait to have sex with a new partner (or partners) until you've each been tested for STIs. It also helps if you use condoms (male or female condoms) and if you limit your sex partners to one person who only has sex with you. Vaccines are available for some STIs. If you think you may have a problem with alcohol or drug use, talk to your doctor. This includes prescription medicines (such as amphetamines and opioids) and illegal drugs (such as cocaine and methamphetamine). Your doctor can help you figure out what type of treatment is best for you. Protect your skin from too much sun. When you're outdoors from 10 a.m. to 4 p.m., stay in the shade or cover up with clothing and a hat with a wide brim. Wear sunglasses that block UV rays. Even when it's cloudy, put broad-spectrum sunscreen (SPF 30 or higher) on any exposed skin. See a dentist one or two times a year for checkups and to have your teeth cleaned. Wear a seat belt in the car. When should you call for help? Watch closely for changes in your health, and be sure to contact your doctor if you have any problems or symptoms that concern you. Where can you learn more? Go to [email or web address]://[email or web address]. org and sign in to your [employer] account. Enter R887 in the [facility] box to learn more about \"Well Visit, Women 50 to 72: Care Instructions. \"     If you do not have an account, please click on the \"Sign Up Now\" link. Current as of: October 6, 2021               Content Version: 13.3  © 2006-2022 Healthwise[employer] Incorporated. Care instructions adapted under license by [facility] ([facility]). If you have questions about a medical condition or this instruction, always ask your healthcare professional. [place] [address] any warranty or liability for your use of this information.

## 2022-07-27 NOTE — PROGRESS NOTES
Well Adult Note  Name: Enoch Argueta Date: 2022   MRN: [id number] Sex: Female   Age: 61 y.o. Ethnicity: Non- / Non    : 1955 Race: Black /       Chief Complaint   Patient presents with    Annual Exam       Jayy Landeros is here for well adult exam.  History:      Patient presents for annual physical exam. Pap smear done on 6/15/22. Mammogram done on 2021. Diabetes Mellitus Type 2 with neuropathy:  Pt has numbness and tingling of both feet. Current Medications: Patient takes Metformin 500mg 2 times daily  Diet: doesn't eat a lot or a lot of carbohydrates, no appetite  Home blood sugar records: monitors fasting and it averages 118-126  Any episodes of hypoglycemia? no  Eye exam current (within one year): yes on 22  Current exercise: walks 3-5 miles 4-5 days per week     Hypertension:    Current Medications: not taking Lisinopril  Home blood pressure monitoring: No  Diet: low salt and no added salt  Current exercise: walks 3-5 miles 4-5 days per week    Hyperlipidemia:  Current medication: not taking Rosuvastatin  Patient denies side effects  Diet: low fat, low cholesterol. Patient sees Kapil Colón Dr Abler    Review of Systems   Constitutional:  Positive for appetite change. Negative for activity change, chills, diaphoresis, fatigue, fever and unexpected weight change. HENT:  Negative for congestion, ear discharge, ear pain, facial swelling, hearing loss, mouth sores, nosebleeds, postnasal drip, rhinorrhea, sinus pressure, sinus pain, sneezing, sore throat, tinnitus, trouble swallowing and voice change. Eyes:  Negative for photophobia, pain, discharge, redness, itching and visual disturbance. Respiratory:  Negative for apnea, cough, choking, chest tightness, shortness of breath and wheezing. Cardiovascular:  Negative for chest pain, palpitations and leg swelling.    Gastrointestinal:  Negative for abdominal distention, abdominal pain, anal bleeding, blood in stool, constipation, diarrhea, nausea, rectal pain and vomiting. Endocrine: Negative for cold intolerance and heat intolerance. Genitourinary:  Negative for decreased urine volume, difficulty urinating, dysuria, flank pain, frequency, genital sores, hematuria, menstrual problem, pelvic pain, urgency, vaginal bleeding, vaginal discharge and vaginal pain. Musculoskeletal:  Negative for arthralgias, back pain, gait problem, joint swelling, myalgias, neck pain and neck stiffness. Skin:  Negative for rash and wound. Neurological:  Positive for numbness. Negative for dizziness, tremors, seizures, syncope, facial asymmetry, speech difficulty, weakness, light-headedness and headaches. Hematological:  Negative for adenopathy. Does not bruise/bleed easily. Psychiatric/Behavioral:  Negative for decreased concentration, dysphoric mood and sleep disturbance. The patient is not nervous/anxious. All other systems reviewed and are negative. No Known Allergies      Prior to Visit Medications    Medication Sig Taking? Authorizing Provider   Multiple Vitamins-Minerals (MULTIVITAMIN ADULTS PO) Take by mouth daily Yes Historical Provider, MD   metFORMIN (GLUCOPHAGE) 500 MG tablet Take 1 tablet by mouth 2 times daily (with meals) Yes Houston Dobson MD   ferrous sulfate (IRON 325) 325 (65 Fe) MG tablet Take 1 tablet by mouth daily (with breakfast) Yes Houston Dobson MD   blood glucose test strips (ONETOUCH ULTRA) strip USE 1 STRIP TO CHECK GLUCOSE TWICE DAILY AS NEEDED Yes Houston Dobson MD   aspirin 81 MG tablet Take 1 tablet by mouth daily Yes Houston Dobson MD   Multiple Minerals TABS Take  by mouth.  Yes Historical Provider, MD         Past Medical History:   Diagnosis Date    Carpal tunnel syndrome     bilateral    Hyperlipidemia     Hypertension     Iron deficiency anemia 2/14/2012    Type 2 diabetes mellitus with peripheral neuropathy ([facility] [address] 8/15/2016    Type II or unspecified type diabetes mellitus without mention of complication, not stated as uncontrolled        Past Surgical History:   Procedure Laterality Date    CARPAL TUNNEL RELEASE Bilateral 2/2006    COLONOSCOPY  12/07/2016    Dayan FAN    ENDOMETRIAL ABLATION  2006    EYE SURGERY  2007    cataract - left - approx date    TUBAL LIGATION           Family History   Problem Relation Age of Onset    Asthma Mother     Heart Disease Mother     Heart Disease Father     Diabetes Father     High Blood Pressure Father     Asthma Sister     Diabetes Sister     Kidney Disease Sister     Seizures Other        Social History     Tobacco Use    Smoking status: Never    Smokeless tobacco: Never   Vaping Use    Vaping Use: Never used   Substance Use Topics    Alcohol use: Yes     Alcohol/week: 5.0 standard drinks     Types: 5 Glasses of wine per week    Drug use: No       Objective   /76   Pulse 76   Temp 97.6 °F (36.4 °C)   Resp 16   Ht 5' 1\" (1.549 m)   Wt 126 lb 9.6 oz (57.4 kg)   SpO2 98%   BMI 23.92 kg/m²   Wt Readings from Last 3 Encounters:   07/27/22 126 lb 9.6 oz (57.4 kg)   12/06/21 126 lb 9.6 oz (57.4 kg)   10/05/21 118 lb 12.8 oz (53.9 kg)     Additional Measurements    07/27/22 1016   Waist (Inches): 33 in         Physical Exam  Nursing note reviewed. Constitutional:       General: She is not in acute distress. Appearance: Normal appearance. She is well-developed. HENT:      Head: Normocephalic and atraumatic. Right Ear: Tympanic membrane, ear canal and external ear normal.      Left Ear: Tympanic membrane, ear canal and external ear normal.      Nose: Nose normal.   Eyes:      General: Lids are normal.      Extraocular Movements: Extraocular movements intact. Conjunctiva/sclera: Conjunctivae normal.      Pupils: Pupils are equal, round, and reactive to light. Neck:      Thyroid: No thyromegaly. Vascular: No carotid bruit.    Cardiovascular:      Rate and Rhythm: Normal rate and regular rhythm. Heart sounds: Normal heart sounds, S1 normal and S2 normal. No murmur heard. No friction rub. No gallop. Pulmonary:      Effort: Pulmonary effort is normal. No respiratory distress. Breath sounds: Normal breath sounds. No wheezing, rhonchi or rales. Abdominal:      General: Bowel sounds are normal. There is no distension. Palpations: Abdomen is soft. There is no hepatomegaly or splenomegaly. Tenderness: There is no abdominal tenderness. There is no rebound. Musculoskeletal:      Right shoulder: No tenderness. Normal range of motion. Left shoulder: No tenderness. Normal range of motion. Right elbow: No tenderness. Left elbow: No tenderness. Right wrist: No swelling or tenderness. Left wrist: No swelling or tenderness. Right hand: No swelling or tenderness. Left hand: No swelling or tenderness. Cervical back: Neck supple. No tenderness. Thoracic back: No tenderness. Lumbar back: No tenderness. Normal range of motion. Right hip: No tenderness. Left hip: No tenderness. Right knee: No tenderness. Left knee: No tenderness. Right lower leg: No edema. Left lower leg: No edema. Right ankle: No swelling. No tenderness. Left ankle: No swelling. No tenderness. Right foot: No swelling. Left foot: No swelling. Lymphadenopathy:      Head:      Right side of head: No submandibular adenopathy. Left side of head: No submandibular adenopathy. Skin:     General: Skin is warm and dry. Comments: No foot ulcers   Neurological:      Mental Status: She is alert and oriented to person, place, and time. Gait: Gait normal.      Deep Tendon Reflexes: Reflexes are normal and symmetric.       Comments: Bilateral foot monofilament exam normal   Psychiatric:         Attention and Perception: Attention normal.         Mood and Affect: Mood normal.         Speech: Speech normal. Results for POC orders placed in visit on 07/27/22   POCT glycosylated hemoglobin (Hb A1C)   Result Value Ref Range    Hemoglobin A1C 9.4 %       Assessment   Plan   1. Encounter for well adult exam without abnormal findings  - CBC with Auto Differential; Future  - Comprehensive Metabolic Panel; Future  - Lipid Panel; Future  - TSH; Future  -Pap smear done on 6/15/22  -Mammogram done on 11/9/2021  -Colonoscopy done on 7/31/2019  -Tdap done on 12/10/2012  -Pneumonia vaccine done on 10/5/2017  -Shingrix vaccine done on 5/24/2019 and 10/15/2019  -COVID-19 vaccine done on 3/5/2021, 4/2/2021, and booster done on 12/6/2021    2. Type 2 diabetes mellitus with peripheral neuropathy (HCC)  -Hemoglobin A1c of 9.4% shows diabetes is uncontrolled  -Increase metFORMIN (GLUCOPHAGE) 1000 MG tablet; Take 1 tablet by mouth in the morning and 1 tablet in the evening. Take with meals. Dispense: 180 tablet; Refill: 1  -Limit carbohydrates to 45 grams with meals and 15 grams with snacks  -monitor blood sugars  -goal for blood sugar fasting or pre-meal  is   -goal for blood sugar 2 hours after a meal is less than 180  -goal for blood sugar at bedtime is less than 150  -Regular aerobic exercise  - POCT glycosylated hemoglobin (Hb A1C)  -  DIABETES FOOT EXAM    3. Essential hypertension  -Resume lisinopril (PRINIVIL;ZESTRIL) 5 MG tablet; Take 1 tablet by mouth once daily  Dispense: 90 tablet; Refill: 1  -Low sodium diet  -Regular aerobic exercise    4. Mixed hyperlipidemia  -Resume rosuvastatin (CRESTOR) 20 MG tablet; Take 1 tablet by mouth once daily  Dispense: 90 tablet; Refill: 1  -Low fat, low cholesterol diet  -Regular aerobic exercise  - Lipid Panel; Future    5. Anemia, unspecified type  - CBC with Auto Differential; Future  - Iron and TIBC; Future  - Ferritin;  Future        Personalized Preventive Plan   Current Health Maintenance Status  Immunization History   Administered Date(s) Administered    COVID-19, [address] [facility], Primary or Immunocompromised, (age 12y+), IM, 100 mcg/0.5mL 03/05/2021, 04/02/2021, 12/06/2021    Influenza 09/10/2012, 09/17/2013    Influenza Virus Vaccine 12/18/2014    Influenza Whole 10/18/2011    Influenza, Intradermal, Preservative free 10/26/2015    Influenza, Intradermal, Quadrivalent, Preservative Free 10/13/2016, 10/05/2017    Influenza, MDCK Quadv, IM, PF (Flucelvax 2 yrs and older) 09/20/2021    Influenza, Quadv, IM, PF (6 mo and older Fluzone, Flulaval, Fluarix, and 3 yrs and older Afluria) 12/03/2019, 10/19/2020    Pneumococcal Conjugate 13-valent ([id number]) 10/26/2015    Pneumococcal Polysaccharide (Wtsoithlm22) 10/05/2017    Tdap (Boostrix, Adacel) 12/10/2012    Zoster Recombinant (Shingrix) 05/15/2019, 05/24/2019, 10/15/2019        Health Maintenance   Topic Date Due    HIV screen  Never done    Cervical cancer screen  01/16/2016    COVID-19 Vaccine (4 - Booster for Moderna series) 04/06/2022    Flu vaccine (1) 09/01/2022    Diabetic microalbuminuria test  09/18/2022    A1C test (Diabetic or Prediabetic)  10/27/2022    DTaP/Tdap/Td vaccine (2 - Td or Tdap) 12/10/2022    Pneumococcal 0-64 years Vaccine (3 - PPSV23 or PCV20) 12/16/2022    Diabetic foot exam  07/27/2023    Lipids  07/27/2023    Depression Screen  07/27/2023    Breast cancer screen  11/09/2023    Diabetic retinal exam  05/23/2024    Colorectal Cancer Screen  07/31/2029    Shingles vaccine  Completed    Hepatitis C screen  Completed    Hepatitis A vaccine  Aged Out    Hib vaccine  Aged Out    Meningococcal (ACWY) vaccine  Aged Out     Recommendations for [employer] Due: see orders and patient instructions/AVS.    Return in about 3 months (around 10/27/2022) for diabetes, hypertension, and hyperlipidemia.

## 2022-08-09 ASSESSMENT — ENCOUNTER SYMPTOMS
FACIAL SWELLING: 0
EYE REDNESS: 0
SINUS PAIN: 0
COUGH: 0
EYE DISCHARGE: 0
WHEEZING: 0
RECTAL PAIN: 0
EYE ITCHING: 0
CONSTIPATION: 0
RHINORRHEA: 0
ABDOMINAL DISTENTION: 0
ANAL BLEEDING: 0
VOICE CHANGE: 0
SHORTNESS OF BREATH: 0
VOMITING: 0
CHOKING: 0
ABDOMINAL PAIN: 0
BLOOD IN STOOL: 0
APNEA: 0
PHOTOPHOBIA: 0
EYE PAIN: 0
CHEST TIGHTNESS: 0
DIARRHEA: 0
SINUS PRESSURE: 0
BACK PAIN: 0
SORE THROAT: 0
NAUSEA: 0
TROUBLE SWALLOWING: 0

## 2022-08-26 DIAGNOSIS — D64.9 ANEMIA, UNSPECIFIED TYPE: ICD-10-CM

## 2022-08-26 NOTE — TELEPHONE ENCOUNTER
Patient called  and requested to refill the following medications. ferrous sulfate (IRON 325) 325 (65 Fe) MG tablet [[id number]]     Order Details  Dose: 325 mg           [address], [place] [address] [place]   Phone:  [phone]  Fax:  [phone]      Please review.       Thanks

## 2022-08-26 NOTE — TELEPHONE ENCOUNTER
Medication:   Requested Prescriptions     Pending Prescriptions Disp Refills    ferrous sulfate (IRON 325) 325 (65 Fe) MG tablet 30 tablet 0     Sig: Take 1 tablet by mouth daily (with breakfast)     Last Filled:  8.19.21    Last appt: 7/27/2022   Next appt: 10/27/2022    Last Labs DM:   Lab Results   Component Value Date/Time    LABA1C 9.4 07/27/2022 10:28 AM     Last Lipid:   Lab Results   Component Value Date/Time    CHOL 261 07/27/2022 11:17 AM    TRIG 123 07/27/2022 11:17 AM    HDL 83 07/27/2022 11:17 AM    HDL 63 02/14/2012 10:10 AM    LDLCALC 153 07/27/2022 11:17 AM     Last PSA: No results found for: PSA  Last Thyroid:   Lab Results   Component Value Date/Time    TSH 1.64 07/27/2022 11:17 AM    T4FREE 1.1 05/06/2019 06:08 AM

## 2022-08-27 RX ORDER — FERROUS SULFATE 325(65) MG
325 TABLET ORAL
Qty: 30 TABLET | Refills: 1 | Status: SHIPPED | OUTPATIENT
Start: 2022-08-27 | End: 2022-10-25 | Stop reason: SDUPTHER

## 2022-09-06 ENCOUNTER — TELEPHONE (OUTPATIENT)
Dept: PRIMARY CARE CLINIC | Age: 67
End: 2022-09-06

## 2022-10-04 ENCOUNTER — TELEPHONE (OUTPATIENT)
Dept: PRIMARY CARE CLINIC | Age: 67
End: 2022-10-04

## 2022-10-05 ENCOUNTER — OFFICE VISIT (OUTPATIENT)
Dept: PRIMARY CARE CLINIC | Age: 67
End: 2022-10-05
Payer: MEDICARE

## 2022-10-05 ENCOUNTER — HOSPITAL ENCOUNTER (OUTPATIENT)
Dept: GENERAL RADIOLOGY | Age: 67
Discharge: HOME | End: 2022-10-05
Payer: MEDICARE

## 2022-10-05 VITALS
HEART RATE: 92 BPM | DIASTOLIC BLOOD PRESSURE: 80 MMHG | BODY MASS INDEX: 22.11 KG/M2 | OXYGEN SATURATION: 100 % | WEIGHT: 117 LBS | SYSTOLIC BLOOD PRESSURE: 92 MMHG

## 2022-10-05 DIAGNOSIS — M25.511 RIGHT SHOULDER PAIN, UNSPECIFIED CHRONICITY: ICD-10-CM

## 2022-10-05 DIAGNOSIS — Z23 NEED FOR INFLUENZA VACCINATION: ICD-10-CM

## 2022-10-05 DIAGNOSIS — G31.84 MILD COGNITIVE IMPAIRMENT: Primary | ICD-10-CM

## 2022-10-05 PROCEDURE — G8420 CALC BMI NORM PARAMETERS: HCPCS | Performed by: INTERNAL MEDICINE

## 2022-10-05 PROCEDURE — 3017F COLORECTAL CA SCREEN DOC REV: CPT | Performed by: INTERNAL MEDICINE

## 2022-10-05 PROCEDURE — G8482 FLU IMMUNIZE ORDER/ADMIN: HCPCS | Performed by: INTERNAL MEDICINE

## 2022-10-05 PROCEDURE — G0008 ADMIN INFLUENZA VIRUS VAC: HCPCS | Performed by: INTERNAL MEDICINE

## 2022-10-05 PROCEDURE — 90674 CCIIV4 VAC NO PRSV 0.5 ML IM: CPT | Performed by: INTERNAL MEDICINE

## 2022-10-05 PROCEDURE — 73030 X-RAY EXAM OF SHOULDER: CPT

## 2022-10-05 PROCEDURE — 1036F TOBACCO NON-USER: CPT | Performed by: INTERNAL MEDICINE

## 2022-10-05 PROCEDURE — G8427 DOCREV CUR MEDS BY ELIG CLIN: HCPCS | Performed by: INTERNAL MEDICINE

## 2022-10-05 PROCEDURE — 99213 OFFICE O/P EST LOW 20 MIN: CPT | Performed by: INTERNAL MEDICINE

## 2022-10-05 NOTE — PATIENT INSTRUCTIONS
-Tylenol ES 500mg 1 tablet every 6 hours as needed  -Referral to Neuropsychology for evaluation   -Referral to Orthopedics

## 2022-10-05 NOTE — PROGRESS NOTES
[facility]   Date ofBirth:  1955    Date of Visit:  10/5/2022    Chief Complaint   Patient presents with    Other     fmla    Shoulder Pain     Right        HPI  Patient has mild cognitive impairment. Patient's  is present and needs FMLA paperwork for intermittent leave. Patient's  and daughter are both present at the visit. Patient's daughter states patient may forget a conversation she has had with her 20-30 minutes later. Patient's daughter states she will ask about the same thing over again. Patient's  states with the new instructions, directions, and new information patient does not retain and does not do well with. Patient's  states patient messes up dates. Patient's daughter states new things that are not significant or more trivial patient does not remember. Patient states she tries to write things down. Patient does not retain small details. Patient does not remember her appointments. Patient does better with routine things. Patient's daughter states if there is something outside of patient's regular routine she does not remember as well. Patient walks daily because walking is a regular routine for her. Patient's family states patient is okay with directions with driving because she knows the city of [place]. Patient saw neuropsychology in 2019. Patient complaints of right shoulder pain. Patient tripped and fell while while walking 1 month ago. Patient states that her right shoulder hurts. Pain is sharp and constant. Patient states it hurts to raise her arm. Patient has not taken anything for pain. Review of Systems   Constitutional:  Negative for activity change, chills and fever. Respiratory:  Negative for shortness of breath. Cardiovascular:  Negative for chest pain. Musculoskeletal:  Positive for arthralgias. Negative for joint swelling. Neurological:  Negative for weakness.    Psychiatric/Behavioral:  Positive for confusion. Negative for agitation, behavioral problems and dysphoric mood. The patient is not nervous/anxious. No Known Allergies  Outpatient Medications Marked as Taking for the 10/5/22 encounter (Office Visit) with Kassidy Morrison MD   Medication Sig Dispense Refill    ferrous sulfate (IRON 325) 325 (65 Fe) MG tablet Take 1 tablet by mouth daily (with breakfast) 30 tablet 1    Multiple Vitamins-Minerals (MULTIVITAMIN ADULTS PO) Take by mouth daily      metFORMIN (GLUCOPHAGE) 1000 MG tablet Take 1 tablet by mouth in the morning and 1 tablet in the evening. Take with meals. 180 tablet 1    lisinopril (PRINIVIL;ZESTRIL) 5 MG tablet Take 1 tablet by mouth once daily 90 tablet 1    rosuvastatin (CRESTOR) 20 MG tablet Take 1 tablet by mouth once daily 90 tablet 1    blood glucose test strips (ONETOUCH ULTRA) strip USE 1 STRIP TO CHECK GLUCOSE TWICE DAILY AS NEEDED 200 each 2    aspirin 81 MG tablet Take 1 tablet by mouth daily 30 tablet 11    Multiple Minerals TABS Take  by mouth. Vitals:    10/05/22 0905   BP: 92/80   Pulse: 92   SpO2: 100%   Weight: 117 lb (53.1 kg)     Body mass index is 22.11 kg/m². Physical Exam  Nursing note reviewed. Constitutional:       General: She is not in acute distress. Appearance: Normal appearance. She is well-developed. HENT:      Mouth/Throat:      Pharynx: Oropharynx is clear. Eyes:      Extraocular Movements: Extraocular movements intact. Pupils: Pupils are equal, round, and reactive to light. Neck:      Thyroid: No thyromegaly. Vascular: No carotid bruit. Cardiovascular:      Rate and Rhythm: Normal rate and regular rhythm. Heart sounds: Normal heart sounds, S1 normal and S2 normal. No murmur heard. Pulmonary:      Effort: Pulmonary effort is normal. No respiratory distress. Breath sounds: Normal breath sounds. Abdominal:      General: Bowel sounds are normal. There is no distension. Palpations: Abdomen is soft. Tenderness: There is no abdominal tenderness. Musculoskeletal:      Right shoulder: Tenderness present. Decreased range of motion. Cervical back: Neck supple. Neurological:      Mental Status: She is alert. Psychiatric:         Mood and Affect: Mood normal.         No results found for this visit on 10/05/22.   Lab Review   Orders Only on 07/27/2022   Component Date Value    Ferritin 07/27/2022 32.0     Iron 07/27/2022 70     TIBC 07/27/2022 430     Iron Saturation 07/27/2022 16     TSH 07/27/2022 1.64     Cholesterol, Total 07/27/2022 261 (A)     Triglycerides 07/27/2022 123     HDL 07/27/2022 83 (A)     LDL Calculated 07/27/2022 153 (A)     VLDL Cholesterol Calcula* 07/27/2022 25     Sodium 07/27/2022 140     Potassium 07/27/2022 3.9     Chloride 07/27/2022 101     CO2 07/27/2022 25     Anion Gap 07/27/2022 14     Glucose 07/27/2022 195 (A)     BUN 07/27/2022 9     Creatinine 07/27/2022 1.2     GFR Non- 07/27/2022 45 (A)     GFR  07/27/2022 55 (A)     Calcium 07/27/2022 10.3     Total Protein 07/27/2022 7.8     Albumin 07/27/2022 4.4     Albumin/Globulin Ratio 07/27/2022 1.3     Total Bilirubin 07/27/2022 0.3     Alkaline Phosphatase 07/27/2022 68     ALT 07/27/2022 17     AST 07/27/2022 36     WBC 07/27/2022 6.7     RBC 07/27/2022 3.88 (A)     Hemoglobin 07/27/2022 11.0 (A)     Hematocrit 07/27/2022 33.2 (A)     MCV 07/27/2022 85.5     MCH 07/27/2022 28.2     MCHC 07/27/2022 33.0     RDW 07/27/2022 14.1     Platelets 91/83/2918 284     MPV 07/27/2022 7.3     Neutrophils % 07/27/2022 61.0     Lymphocytes % 07/27/2022 29.3     Monocytes % 07/27/2022 6.9     Eosinophils % 07/27/2022 2.1     Basophils % 07/27/2022 0.7     Neutrophils Absolute 07/27/2022 4.1     Lymphocytes Absolute 07/27/2022 2.0     Monocytes Absolute 07/27/2022 0.5     Eosinophils Absolute 07/27/2022 0.1     Basophils Absolute 07/27/2022 0.0    Abstract on 07/27/2022   Component Date Value    PAP Smear, External 01/16/2013 . Office Visit on 07/27/2022   Component Date Value    Hemoglobin A1C 07/27/2022 9.4    Orders Only on 05/23/2022   Component Date Value    Visual Acuity Distance R* 05/23/2022 20/40     Visual Acuity Distance L* 05/23/2022 20/20     Intraocular Pressure Eye 05/23/2022                      Value:20  21      Diabetic Retinopathy 05/23/2022 NEGATIVE     Cataracts 05/23/2022 POSITIVE     Glaucoma 05/23/2022 NEGATIVE          Assessment/Plan     1. Mild cognitive impairment  -Same  -Referral to Elana Silva, PhD, Psychology, [facility] for recommendations  -Consider Donepezil  -Will do FMLA paperwork for patient's     2. Right shoulder pain, unspecified chronicity  - XR SHOULDER RIGHT (MIN 2 VIEWS); Future  -Tylenol ES 500mg 1 tablet every 6 hours as needed  - External Referral To Orthopedic Surgery    3. Need for influenza vaccination  - Influenza, FLUCELVAX, (age 10 mo+), IM, Preservative Free, 0.5 mL given      Discussed medications with patient, who voiced understanding of their use and indications. All questions answered. Return if symptoms worsen or fail to improve.

## 2022-10-07 ENCOUNTER — TELEPHONE (OUTPATIENT)
Dept: INTERNAL MEDICINE CLINIC | Age: 67
End: 2022-10-07

## 2022-10-07 NOTE — TELEPHONE ENCOUNTER
----- Message from Ramandeep Weathers sent at 10/4/2022  2:22 PM EDT -----  Subject: Appointment Request    Reason for Call: New Patient/New to Provider Appointment needed: New   Patient Request Appointment    QUESTIONS    Reason for appointment request? Requested Provider unavailable - Dr. Mickey Barkley     Additional Information for Provider?  Pt would like to establish care,   please call back with info.  ---------------------------------------------------------------------------  --------------  [facility]  [phone]; OK to leave message on voicemail  ---------------------------------------------------------------------------  --------------  SCRIPT ANSWERS  COVID Screen: Michael Roman

## 2022-10-10 ENCOUNTER — TELEPHONE (OUTPATIENT)
Dept: PRIMARY CARE CLINIC | Age: 67
End: 2022-10-10

## 2022-10-10 NOTE — TELEPHONE ENCOUNTER
Callkirk Coleman - ((spouse - on HIPAA)) requesting status of completed FMLA paperwork for patient. Katerin Gallagher states FMLA paperwork is needing to be submitted by tomorrow - 10/11/2022 or will be canceled.     Please follow up with patient

## 2022-10-11 PROBLEM — G31.84 MILD COGNITIVE IMPAIRMENT: Status: ACTIVE | Noted: 2022-10-11

## 2022-10-11 PROBLEM — M25.511 RIGHT SHOULDER PAIN: Status: ACTIVE | Noted: 2022-10-11

## 2022-10-11 ASSESSMENT — ENCOUNTER SYMPTOMS: SHORTNESS OF BREATH: 0

## 2022-10-11 NOTE — TELEPHONE ENCOUNTER
Faxed over completed [facility] paperwork @ [phone], Fax receipt and forms scanned into chart

## 2022-10-25 DIAGNOSIS — D64.9 ANEMIA, UNSPECIFIED TYPE: ICD-10-CM

## 2022-10-25 RX ORDER — FERROUS SULFATE 325(65) MG
325 TABLET ORAL
Qty: 30 TABLET | Refills: 2 | Status: SHIPPED | OUTPATIENT
Start: 2022-10-25

## 2022-10-25 NOTE — TELEPHONE ENCOUNTER
Medication:   Requested Prescriptions     Pending Prescriptions Disp Refills    ferrous sulfate (IRON 325) 325 (65 Fe) MG tablet 30 tablet 1     Sig: Take 1 tablet by mouth daily (with breakfast)     Last Filled: 8.27.22    Last appt: 7/27/2022   Next appt: 10/31/2022    Last OARRS: No flowsheet data found.

## 2022-10-25 NOTE — TELEPHONE ENCOUNTER
Pt  Varinder Solomon called. Pt needs a meds refill.  Lov 10/5/22    ferrous sulfate (IRON 325) 325 (65 Fe) MG tablet  [id number] [facility],[address] [facility] [address], [address] [facility] [address] [facility]   Phone:  [phone]  Fax:  [phone]

## 2022-10-31 ENCOUNTER — TELEPHONE (OUTPATIENT)
Dept: PRIMARY CARE CLINIC | Age: 67
End: 2022-10-31

## 2022-10-31 ENCOUNTER — OFFICE VISIT (OUTPATIENT)
Dept: PRIMARY CARE CLINIC | Age: 67
End: 2022-10-31
Payer: COMMERCIAL

## 2022-10-31 VITALS
OXYGEN SATURATION: 100 % | SYSTOLIC BLOOD PRESSURE: 102 MMHG | HEART RATE: 72 BPM | BODY MASS INDEX: 21.16 KG/M2 | WEIGHT: 112 LBS | DIASTOLIC BLOOD PRESSURE: 76 MMHG

## 2022-10-31 DIAGNOSIS — E11.42 TYPE 2 DIABETES MELLITUS WITH PERIPHERAL NEUROPATHY (HCC): ICD-10-CM

## 2022-10-31 DIAGNOSIS — R63.4 WEIGHT LOSS: ICD-10-CM

## 2022-10-31 DIAGNOSIS — E78.2 MIXED HYPERLIPIDEMIA: ICD-10-CM

## 2022-10-31 DIAGNOSIS — I10 ESSENTIAL HYPERTENSION: ICD-10-CM

## 2022-10-31 DIAGNOSIS — E11.42 TYPE 2 DIABETES MELLITUS WITH PERIPHERAL NEUROPATHY (HCC): Primary | ICD-10-CM

## 2022-10-31 LAB
BASOPHILS ABSOLUTE: 0 K/UL (ref 0–0.2)
BASOPHILS RELATIVE PERCENT: 0.4 %
EOSINOPHILS ABSOLUTE: 0.1 K/UL (ref 0–0.6)
EOSINOPHILS RELATIVE PERCENT: 1 %
HBA1C MFR BLD: 8 %
HCT VFR BLD CALC: 32.7 % (ref 36–48)
HEMOGLOBIN: 10.8 G/DL (ref 12–16)
LYMPHOCYTES ABSOLUTE: 2.8 K/UL (ref 1–5.1)
LYMPHOCYTES RELATIVE PERCENT: 43.8 %
MCH RBC QN AUTO: 26.8 PG (ref 26–34)
MCHC RBC AUTO-ENTMCNC: 33.2 G/DL (ref 31–36)
MCV RBC AUTO: 80.8 FL (ref 80–100)
MONOCYTES ABSOLUTE: 0.3 K/UL (ref 0–1.3)
MONOCYTES RELATIVE PERCENT: 4.8 %
NEUTROPHILS ABSOLUTE: 3.2 K/UL (ref 1.7–7.7)
NEUTROPHILS RELATIVE PERCENT: 50 %
PDW BLD-RTO: 13.8 % (ref 12.4–15.4)
PLATELET # BLD: 309 K/UL (ref 135–450)
PMV BLD AUTO: 7.3 FL (ref 5–10.5)
RBC # BLD: 4.05 M/UL (ref 4–5.2)
WBC # BLD: 6.5 K/UL (ref 4–11)

## 2022-10-31 PROCEDURE — 2022F DILAT RTA XM EVC RTNOPTHY: CPT | Performed by: INTERNAL MEDICINE

## 2022-10-31 PROCEDURE — 83036 HEMOGLOBIN GLYCOSYLATED A1C: CPT | Performed by: INTERNAL MEDICINE

## 2022-10-31 PROCEDURE — 3052F HG A1C>EQUAL 8.0%<EQUAL 9.0%: CPT | Performed by: INTERNAL MEDICINE

## 2022-10-31 PROCEDURE — G8427 DOCREV CUR MEDS BY ELIG CLIN: HCPCS | Performed by: INTERNAL MEDICINE

## 2022-10-31 PROCEDURE — 3074F SYST BP LT 130 MM HG: CPT | Performed by: INTERNAL MEDICINE

## 2022-10-31 PROCEDURE — 3017F COLORECTAL CA SCREEN DOC REV: CPT | Performed by: INTERNAL MEDICINE

## 2022-10-31 PROCEDURE — 3078F DIAST BP <80 MM HG: CPT | Performed by: INTERNAL MEDICINE

## 2022-10-31 PROCEDURE — G8482 FLU IMMUNIZE ORDER/ADMIN: HCPCS | Performed by: INTERNAL MEDICINE

## 2022-10-31 PROCEDURE — 99214 OFFICE O/P EST MOD 30 MIN: CPT | Performed by: INTERNAL MEDICINE

## 2022-10-31 PROCEDURE — G8420 CALC BMI NORM PARAMETERS: HCPCS | Performed by: INTERNAL MEDICINE

## 2022-10-31 PROCEDURE — 1036F TOBACCO NON-USER: CPT | Performed by: INTERNAL MEDICINE

## 2022-10-31 NOTE — TELEPHONE ENCOUNTER
Pt saw you today and forgot to ask about a handicap placard. Please call pt's  at the number below.     [phone]

## 2022-10-31 NOTE — PROGRESS NOTES
Aline Medina   Date ofBirth:  1955    Date of Visit:  10/31/2022    Chief Complaint   Patient presents with    Diabetes    Hypertension    Cholesterol Problem       HPI    Diabetes Mellitus Type 2 with neuropathy:  Pt has numbness and tingling of both feet. Current Medications: Patient takes Metformin 1000mg 2 times daily  Diet:  patient doesn't eat a lot of carbohydrates   Home blood sugar records: monitors fasting and blood sugar ok  Any episodes of hypoglycemia? no  Eye exam current (within one year): yes on 5/23/22  Current exercise: walks for exercise     Hypertension:    Current Medications:  Lisinopril 5mg once daily  Home blood pressure monitoring: No  Diet: low salt and no added salt  Current exercise: walks for exercise     Hyperlipidemia:  Current medication: Rosuvastatin 20mg once daily  Diet: low fat, low cholesterol. Weight loss:  Patient is losing weight and not eating again. Patient denies abdominal pain, nausea, and vomiting. .        Wt Readings from Last 3 Encounters:   10/31/22 112 lb (50.8 kg)   10/05/22 117 lb (53.1 kg)   07/27/22 126 lb 9.6 oz (57.4 kg)            Review of Systems   Constitutional:  Positive for unexpected weight change. Negative for activity change, chills, fatigue and fever. HENT:  Negative for congestion, postnasal drip, rhinorrhea and sore throat. Eyes:  Negative for visual disturbance. Respiratory:  Negative for cough, chest tightness, shortness of breath and wheezing. Cardiovascular:  Negative for chest pain, palpitations and leg swelling. Gastrointestinal:  Negative for abdominal pain, constipation, diarrhea, nausea and vomiting. Genitourinary:  Negative for dysuria, frequency, hematuria and urgency. Musculoskeletal:  Negative for myalgias. Skin:  Negative for wound. Neurological:  Positive for numbness. Negative for dizziness, syncope, light-headedness and headaches.      No Known Allergies  Outpatient Medications Marked as Taking for the 10/31/22 encounter (Office Visit) with Mindy Bowers MD   Medication Sig Dispense Refill    ferrous sulfate (IRON 325) 325 (65 Fe) MG tablet Take 1 tablet by mouth daily (with breakfast) 30 tablet 2    Multiple Vitamins-Minerals (MULTIVITAMIN ADULTS PO) Take by mouth daily      metFORMIN (GLUCOPHAGE) 1000 MG tablet Take 1 tablet by mouth in the morning and 1 tablet in the evening. Take with meals. 180 tablet 1    lisinopril (PRINIVIL;ZESTRIL) 5 MG tablet Take 1 tablet by mouth once daily 90 tablet 1    rosuvastatin (CRESTOR) 20 MG tablet Take 1 tablet by mouth once daily 90 tablet 1    blood glucose test strips (ONETOUCH ULTRA) strip USE 1 STRIP TO CHECK GLUCOSE TWICE DAILY AS NEEDED 200 each 2    aspirin 81 MG tablet Take 1 tablet by mouth daily 30 tablet 11    Multiple Minerals TABS Take  by mouth. Vitals:    10/31/22 1204   BP: 102/76   Pulse: 72   SpO2: 100%   Weight: 112 lb (50.8 kg)     Body mass index is 21.16 kg/m². Physical Exam  Nursing note reviewed. Constitutional:       General: She is not in acute distress. Appearance: Normal appearance. She is well-developed. Eyes:      Extraocular Movements: Extraocular movements intact. Pupils: Pupils are equal, round, and reactive to light. Neck:      Thyroid: No thyromegaly. Vascular: No carotid bruit. Cardiovascular:      Rate and Rhythm: Normal rate and regular rhythm. Heart sounds: Normal heart sounds, S1 normal and S2 normal. No murmur heard. No friction rub. No gallop. Pulmonary:      Effort: Pulmonary effort is normal. No respiratory distress. Breath sounds: Normal breath sounds. No wheezing. Abdominal:      General: Bowel sounds are normal. There is no distension. Palpations: Abdomen is soft. Tenderness: There is no abdominal tenderness. Musculoskeletal:      Cervical back: Neck supple. Right lower leg: No edema. Left lower leg: No edema.    Neurological: Mental Status: She is alert. Results for POC orders placed in visit on 10/31/22   POCT glycosylated hemoglobin (Hb A1C)   Result Value Ref Range    Hemoglobin A1C 8.0 %     Lab Review   Orders Only on 07/27/2022   Component Date Value    Ferritin 07/27/2022 32.0     Iron 07/27/2022 70     TIBC 07/27/2022 430     Iron Saturation 07/27/2022 16     TSH 07/27/2022 1.64     Cholesterol, Total 07/27/2022 261 (A)     Triglycerides 07/27/2022 123     HDL 07/27/2022 83 (A)     LDL Calculated 07/27/2022 153 (A)     VLDL Cholesterol Calcula* 07/27/2022 25     Sodium 07/27/2022 140     Potassium 07/27/2022 3.9     Chloride 07/27/2022 101     CO2 07/27/2022 25     Anion Gap 07/27/2022 14     Glucose 07/27/2022 195 (A)     BUN 07/27/2022 9     Creatinine 07/27/2022 1.2     GFR Non- 07/27/2022 45 (A)     GFR  07/27/2022 55 (A)     Calcium 07/27/2022 10.3     Total Protein 07/27/2022 7.8     Albumin 07/27/2022 4.4     Albumin/Globulin Ratio 07/27/2022 1.3     Total Bilirubin 07/27/2022 0.3     Alkaline Phosphatase 07/27/2022 68     ALT 07/27/2022 17     AST 07/27/2022 36     WBC 07/27/2022 6.7     RBC 07/27/2022 3.88 (A)     Hemoglobin 07/27/2022 11.0 (A)     Hematocrit 07/27/2022 33.2 (A)     MCV 07/27/2022 85.5     MCH 07/27/2022 28.2     MCHC 07/27/2022 33.0     RDW 07/27/2022 14.1     Platelets 37/80/1866 284     MPV 07/27/2022 7.3     Neutrophils % 07/27/2022 61.0     Lymphocytes % 07/27/2022 29.3     Monocytes % 07/27/2022 6.9     Eosinophils % 07/27/2022 2.1     Basophils % 07/27/2022 0.7     Neutrophils Absolute 07/27/2022 4.1     Lymphocytes Absolute 07/27/2022 2.0     Monocytes Absolute 07/27/2022 0.5     Eosinophils Absolute 07/27/2022 0.1     Basophils Absolute 07/27/2022 0.0    Abstract on 07/27/2022   Component Date Value    PAP Smear, External 01/16/2013 .     Office Visit on 07/27/2022   Component Date Value    Hemoglobin A1C 07/27/2022 9.4    Orders Only on 05/23/2022 Component Date Value    Visual Acuity Distance R* 05/23/2022 20/40     Visual Acuity Distance L* 05/23/2022 20/20     Intraocular Pressure Eye 05/23/2022                      Value:20  21      Diabetic Retinopathy 05/23/2022 NEGATIVE     Cataracts 05/23/2022 POSITIVE     Glaucoma 05/23/2022 NEGATIVE          Assessment/Plan     1. Type 2 diabetes mellitus with peripheral neuropathy (HCC)  -Hemoglobin A1c of 8.0% shows diabetes is not controlled  -Continue metFORMIN (GLUCOPHAGE) 1000 MG tablet; Take 1 tablet by mouth in the morning and 1 tablet in the evening  -Start SITagliptin (JANUVIA) 50 MG tablet; Take 1 tablet by mouth daily  Dispense: 90 tablet; Refill: 1  -Continue same medications  -Limit carbohydrates to 45 grams with meals and 15 grams with snacks  -monitor blood sugars  -goal for blood sugar fasting or pre-meal  is   -goal for blood sugar 2 hours after a meal is less than 180  -goal for blood sugar at bedtime is less than 150  -Regular aerobic exercise  - POCT glycosylated hemoglobin (Hb A1C)  - Comprehensive Metabolic Panel; Future  - Microalbumin / Creatinine Urine Ratio; Future    2. Essential hypertension  -Stable  -Continue lisinopril (PRINIVIL;ZESTRIL) 5 MG tablet; Take 1 tablet by mouth once daily    -Low sodium diet  -Regular aerobic exercise  -Comprehensive Metabolic Panel; Future    3. Mixed hyperlipidemia  -Last LDL is not at goal  -Patient has resumed rosuvastatin 20 mg once daily  -Low fat, low cholesterol diet  -Regular aerobic exercise  -Comprehensive Metabolic Panel; Future  -Lipid Panel; Future    4. Weight loss  - CBC with Auto Differential; Future  -Glucerna shakes 1-2 times daily  -Referral to Dwayne Barnard MD, Gastroenterology, [facility]      Discussed medications with patient, who voiced understanding of their use and indications. All questions answered. Return in about 3 months (around 1/31/2023) for diabetes.

## 2022-10-31 NOTE — PATIENT INSTRUCTIONS
-Continue same medications  -Limit carbohydrates to 45 grams with meals and 15 grams with snacks  -monitor blood sugars  -goal for blood sugar fasting or pre-meal  is   -goal for blood sugar 2 hours after a meal is less than 180  -goal for blood sugar at bedtime is less than 150  -Regular aerobic exercise    Glucerna shakes 1-2 times daily

## 2022-11-01 LAB
A/G RATIO: 1.8 (ref 1.1–2.2)
ALBUMIN SERPL-MCNC: 4.9 G/DL (ref 3.4–5)
ALP BLD-CCNC: 44 U/L (ref 40–129)
ALT SERPL-CCNC: 14 U/L (ref 10–40)
ANION GAP SERPL CALCULATED.3IONS-SCNC: 16 MMOL/L (ref 3–16)
AST SERPL-CCNC: 27 U/L (ref 15–37)
BILIRUB SERPL-MCNC: 0.4 MG/DL (ref 0–1)
BUN BLDV-MCNC: 19 MG/DL (ref 7–20)
CALCIUM SERPL-MCNC: 10.2 MG/DL (ref 8.3–10.6)
CHLORIDE BLD-SCNC: 106 MMOL/L (ref 99–110)
CHOLESTEROL, TOTAL: 160 MG/DL (ref 0–199)
CO2: 22 MMOL/L (ref 21–32)
CREAT SERPL-MCNC: 1.5 MG/DL (ref 0.6–1.2)
CREATININE URINE: 444.5 MG/DL (ref 28–259)
GFR SERPL CREATININE-BSD FRML MDRD: 38 ML/MIN/{1.73_M2}
GLUCOSE BLD-MCNC: 82 MG/DL (ref 70–99)
HDLC SERPL-MCNC: 83 MG/DL (ref 40–60)
LDL CHOLESTEROL CALCULATED: 61 MG/DL
MICROALBUMIN UR-MCNC: 22.3 MG/DL
MICROALBUMIN/CREAT UR-RTO: 50.2 MG/G (ref 0–30)
POTASSIUM SERPL-SCNC: 4.7 MMOL/L (ref 3.5–5.1)
SODIUM BLD-SCNC: 144 MMOL/L (ref 136–145)
TOTAL PROTEIN: 7.7 G/DL (ref 6.4–8.2)
TRIGL SERPL-MCNC: 78 MG/DL (ref 0–150)
VLDLC SERPL CALC-MCNC: 16 MG/DL

## 2022-11-10 ASSESSMENT — ENCOUNTER SYMPTOMS
RHINORRHEA: 0
SHORTNESS OF BREATH: 0
NAUSEA: 0
DIARRHEA: 0
WHEEZING: 0
SORE THROAT: 0
ABDOMINAL PAIN: 0
COUGH: 0
CHEST TIGHTNESS: 0
CONSTIPATION: 0
VOMITING: 0

## 2022-11-18 NOTE — TELEPHONE ENCOUNTER
Spoke with patient's  and patient. Patient doesn't have a qualifying diagnosis for the handicap parking placard.

## 2023-02-11 DIAGNOSIS — D64.9 ANEMIA, UNSPECIFIED TYPE: ICD-10-CM

## 2023-02-13 RX ORDER — PNV NO.95/FERROUS FUM/FOLIC AC 28MG-0.8MG
TABLET ORAL
Qty: 30 TABLET | Refills: 0 | Status: SHIPPED | OUTPATIENT
Start: 2023-02-13

## 2023-02-13 NOTE — TELEPHONE ENCOUNTER
Medication:   Requested Prescriptions     Pending Prescriptions Disp Refills    Ferrous Sulfate (IRON) 325 (65 Fe) MG TABS [Pharmacy Med Name: Iron 325 (65 Fe) MG Oral Tablet] 30 tablet 0     Sig: Take 1 tablet by mouth once daily with breakfast     Last Filled:  10/25/2022    Last appt: 10/31/2022   Next appt: Visit date not found    Last OARRS: No flowsheet data found.

## 2023-02-20 ENCOUNTER — OFFICE VISIT (OUTPATIENT)
Dept: PRIMARY CARE CLINIC | Age: 68
End: 2023-02-20

## 2023-02-20 VITALS
BODY MASS INDEX: 17.91 KG/M2 | DIASTOLIC BLOOD PRESSURE: 60 MMHG | OXYGEN SATURATION: 99 % | HEART RATE: 68 BPM | SYSTOLIC BLOOD PRESSURE: 90 MMHG | WEIGHT: 94.8 LBS

## 2023-02-20 DIAGNOSIS — R63.4 WEIGHT LOSS: Primary | ICD-10-CM

## 2023-02-20 DIAGNOSIS — Z91.81 AT HIGH RISK FOR FALLS: ICD-10-CM

## 2023-02-20 DIAGNOSIS — R63.0 APPETITE LOSS: ICD-10-CM

## 2023-02-20 DIAGNOSIS — E11.42 TYPE 2 DIABETES MELLITUS WITH PERIPHERAL NEUROPATHY (HCC): ICD-10-CM

## 2023-02-20 DIAGNOSIS — D64.9 ANEMIA, UNSPECIFIED TYPE: ICD-10-CM

## 2023-02-20 DIAGNOSIS — G31.84 MILD COGNITIVE IMPAIRMENT: ICD-10-CM

## 2023-02-20 RX ORDER — MEMANTINE HYDROCHLORIDE 10 MG/1
TABLET ORAL
COMMUNITY
End: 2023-02-20

## 2023-02-20 RX ORDER — DONEPEZIL HYDROCHLORIDE 5 MG/1
TABLET, FILM COATED ORAL
COMMUNITY
End: 2023-02-20

## 2023-02-20 ASSESSMENT — ENCOUNTER SYMPTOMS
NAUSEA: 0
SHORTNESS OF BREATH: 0
COUGH: 0
DIARRHEA: 0
ABDOMINAL PAIN: 0
VOMITING: 0

## 2023-02-20 ASSESSMENT — PATIENT HEALTH QUESTIONNAIRE - PHQ9
1. LITTLE INTEREST OR PLEASURE IN DOING THINGS: 0
SUM OF ALL RESPONSES TO PHQ9 QUESTIONS 1 & 2: 0
SUM OF ALL RESPONSES TO PHQ QUESTIONS 1-9: 0
2. FEELING DOWN, DEPRESSED OR HOPELESS: 0
SUM OF ALL RESPONSES TO PHQ QUESTIONS 1-9: 0

## 2023-02-20 NOTE — PROGRESS NOTES
Jarocho Allison (:  1955) is a 74 y.o. female,Established patient, here for evaluation of the following chief complaint(s):  Weight Loss (Pt is c/o of loss of appetite and weight loss, on going issues, has an appt with gi in march )      This has been a recurrent issue, saw PCP in October twice for it. Was referred to GI and neuro psych and has appts scheduled in March. She has not been started on any medication for it. Denies fever, chills, sweats, N/V/D, SOB, chest pain, headaches, bloating, cramping. She states she doesn't have an appetite but then also forgets if she has eaten. For example today, her  states that when he came home from work to check on her, she had only eaten a couple bites of her breakfast, and about 30 mins later asked if they had eaten. Not drinking the Glucerna shakes recommending by PCP. 24 hr diet recall: steak, sweet potato, baked potato-just a couple bites of each, scrambled eggs and cheese- few bites, half of a fruit cup, 1 strip of khanna, half a muffin.  of pt here and providing most of HPI. States that he has been trying to test her blood sugars at home (has started to do it because of her dementia) and states he is having issues. Wondering if we could check it here to see what it is. ASSESSMENT/PLAN:  1. Weight loss  -     CBC with Auto Differential; Future  -     Comprehensive Metabolic Panel; Future  2. Appetite loss  -     CBC with Auto Differential; Future  -     Comprehensive Metabolic Panel; Future  3. Anemia, unspecified type  -     CBC with Auto Differential; Future  4. At high risk for falls  5. Mild cognitive impairment  6. Type 2 diabetes mellitus with peripheral neuropathy (HCC)  -     POCT Glucose    Return in about 2 weeks (around 3/6/2023).        -Attempted to get blood in office due to dehydration  -POCT glucose 110  -long discussion regarding dementia, loss of appetite and weight loss, discussed that there are some medications that can help, but would need to see PCP and neuro psych   -Discussed ways to stay hydrated and eating food, discussed symptoms to seek immediate medical care. On the basis of positive falls risk screening, assessment and plan is as follows: home safety tips provided, instructed to follow up with PCP . Subjective   SUBJECTIVE/OBJECTIVE:      Review of Systems   Constitutional:  Positive for appetite change. Negative for chills, diaphoresis, fatigue and fever. Respiratory:  Negative for cough and shortness of breath. Cardiovascular:  Negative for chest pain. Gastrointestinal:  Negative for abdominal pain, diarrhea, nausea and vomiting. Endocrine: Negative for cold intolerance, heat intolerance, polydipsia, polyphagia and polyuria. Skin:  Negative for rash. Neurological:  Negative for dizziness, seizures, syncope, weakness, light-headedness, numbness and headaches. Psychiatric/Behavioral:          Dementia        Objective   Physical Exam  Vitals and nursing note reviewed. Constitutional:       Appearance: Normal appearance. She is well-developed and well-groomed. HENT:      Mouth/Throat:      Mouth: Mucous membranes are moist.   Cardiovascular:      Rate and Rhythm: Normal rate and regular rhythm. Pulses: Normal pulses. Heart sounds: Normal heart sounds, S1 normal and S2 normal.   Pulmonary:      Effort: Pulmonary effort is normal.      Breath sounds: Normal breath sounds and air entry. Abdominal:      General: Abdomen is flat. Bowel sounds are normal.      Palpations: Abdomen is soft. Tenderness: There is no abdominal tenderness. Musculoskeletal:      Right lower leg: No edema. Left lower leg: No edema. Neurological:      Mental Status: She is alert. Psychiatric:         Behavior: Behavior is cooperative. An electronic signature was used to authenticate this note.     --ELISHA Deleon - CNP

## 2023-02-22 DIAGNOSIS — D64.9 ANEMIA, UNSPECIFIED TYPE: ICD-10-CM

## 2023-02-22 DIAGNOSIS — R63.0 APPETITE LOSS: ICD-10-CM

## 2023-02-22 DIAGNOSIS — R63.4 WEIGHT LOSS: ICD-10-CM

## 2023-02-22 LAB
A/G RATIO: 1.6 (ref 1.1–2.2)
ALBUMIN SERPL-MCNC: 4.1 G/DL (ref 3.4–5)
ALP BLD-CCNC: 36 U/L (ref 40–129)
ALT SERPL-CCNC: 18 U/L (ref 10–40)
ANION GAP SERPL CALCULATED.3IONS-SCNC: 12 MMOL/L (ref 3–16)
AST SERPL-CCNC: 27 U/L (ref 15–37)
BASOPHILS ABSOLUTE: 0 K/UL (ref 0–0.2)
BASOPHILS RELATIVE PERCENT: 0.6 %
BILIRUB SERPL-MCNC: 0.3 MG/DL (ref 0–1)
BUN BLDV-MCNC: 18 MG/DL (ref 7–20)
CALCIUM SERPL-MCNC: 9.5 MG/DL (ref 8.3–10.6)
CHLORIDE BLD-SCNC: 106 MMOL/L (ref 99–110)
CO2: 24 MMOL/L (ref 21–32)
CREAT SERPL-MCNC: 1.3 MG/DL (ref 0.6–1.2)
EOSINOPHILS ABSOLUTE: 0.1 K/UL (ref 0–0.6)
EOSINOPHILS RELATIVE PERCENT: 1.7 %
GFR SERPL CREATININE-BSD FRML MDRD: 46 ML/MIN/{1.73_M2}
GLUCOSE BLD-MCNC: 156 MG/DL (ref 70–99)
HCT VFR BLD CALC: 29.2 % (ref 36–48)
HEMOGLOBIN: 9.8 G/DL (ref 12–16)
LYMPHOCYTES ABSOLUTE: 2.1 K/UL (ref 1–5.1)
LYMPHOCYTES RELATIVE PERCENT: 41 %
MCH RBC QN AUTO: 28.1 PG (ref 26–34)
MCHC RBC AUTO-ENTMCNC: 33.7 G/DL (ref 31–36)
MCV RBC AUTO: 83.6 FL (ref 80–100)
MONOCYTES ABSOLUTE: 0.3 K/UL (ref 0–1.3)
MONOCYTES RELATIVE PERCENT: 6.4 %
NEUTROPHILS ABSOLUTE: 2.6 K/UL (ref 1.7–7.7)
NEUTROPHILS RELATIVE PERCENT: 50.3 %
PDW BLD-RTO: 14.1 % (ref 12.4–15.4)
PLATELET # BLD: 410 K/UL (ref 135–450)
PMV BLD AUTO: 8.2 FL (ref 5–10.5)
POTASSIUM SERPL-SCNC: 4.4 MMOL/L (ref 3.5–5.1)
RBC # BLD: 3.5 M/UL (ref 4–5.2)
SODIUM BLD-SCNC: 142 MMOL/L (ref 136–145)
TOTAL PROTEIN: 6.7 G/DL (ref 6.4–8.2)
WBC # BLD: 5.1 K/UL (ref 4–11)

## 2023-02-27 DIAGNOSIS — I10 ESSENTIAL HYPERTENSION: ICD-10-CM

## 2023-02-28 RX ORDER — LISINOPRIL 5 MG/1
TABLET ORAL
Qty: 90 TABLET | Refills: 0 | Status: SHIPPED | OUTPATIENT
Start: 2023-02-28

## 2023-03-05 DIAGNOSIS — E11.42 TYPE 2 DIABETES MELLITUS WITH PERIPHERAL NEUROPATHY (HCC): ICD-10-CM

## 2023-03-05 DIAGNOSIS — E78.2 MIXED HYPERLIPIDEMIA: ICD-10-CM

## 2023-03-06 NOTE — TELEPHONE ENCOUNTER
Medication:   Requested Prescriptions     Pending Prescriptions Disp Refills    rosuvastatin (CRESTOR) 20 MG tablet [Pharmacy Med Name: Rosuvastatin Calcium 20 MG Oral Tablet] 90 tablet 0     Sig: Take 1 tablet by mouth once daily    metFORMIN (GLUCOPHAGE) 1000 MG tablet [Pharmacy Med Name: metFORMIN HCl 1000 MG Oral Tablet] 180 tablet 0     Sig: TAKE 1 TABLET BY MOUTH IN THE MORNING AND 1 IN THE EVENING WITH MEALS     Last Filled:  7.27.22 7.27.22    Last appt: 10/31/2022   Next appt: Due for OV    Last Labs DM:   Lab Results   Component Value Date/Time    LABA1C 8.0 10/31/2022 12:19 PM     Last Lipid:   Lab Results   Component Value Date/Time    CHOL 160 10/31/2022 01:16 PM    TRIG 78 10/31/2022 01:16 PM    HDL 83 10/31/2022 01:16 PM    HDL 63 02/14/2012 10:10 AM    LDLCALC 61 10/31/2022 01:16 PM     Last PSA: No results found for: PSA  Last Thyroid:   Lab Results   Component Value Date/Time    TSH 1.64 07/27/2022 11:17 AM    T4FREE 1.1 05/06/2019 06:08 AM

## 2023-03-07 NOTE — TELEPHONE ENCOUNTER
Medication:   Requested Prescriptions     Pending Prescriptions Disp Refills    rosuvastatin (CRESTOR) 20 MG tablet [Pharmacy Med Name: Rosuvastatin Calcium 20 MG Oral Tablet] 90 tablet 0     Sig: Take 1 tablet by mouth once daily    metFORMIN (GLUCOPHAGE) 1000 MG tablet [Pharmacy Med Name: metFORMIN HCl 1000 MG Oral Tablet] 180 tablet 0     Sig: TAKE 1 TABLET BY MOUTH IN THE MORNING AND 1 IN THE EVENING WITH MEALS     Last Filled:  07/27/2022, 07/27/2022    Last appt: 10/31/2022   Next appt: 3/17/2023    Last Lipid:   Lab Results   Component Value Date/Time    CHOL 160 10/31/2022 01:16 PM    TRIG 78 10/31/2022 01:16 PM    HDL 83 10/31/2022 01:16 PM    HDL 63 02/14/2012 10:10 AM    LDLCALC 61 10/31/2022 01:16 PM

## 2023-03-09 DIAGNOSIS — E78.2 MIXED HYPERLIPIDEMIA: ICD-10-CM

## 2023-03-09 RX ORDER — ROSUVASTATIN CALCIUM 20 MG/1
TABLET, COATED ORAL
Qty: 90 TABLET | Refills: 0 | Status: SHIPPED | OUTPATIENT
Start: 2023-03-09

## 2023-03-10 RX ORDER — ROSUVASTATIN CALCIUM 20 MG/1
TABLET, COATED ORAL
Qty: 90 TABLET | Refills: 0 | OUTPATIENT
Start: 2023-03-10

## 2023-03-12 DIAGNOSIS — D64.9 ANEMIA, UNSPECIFIED TYPE: ICD-10-CM

## 2023-03-13 RX ORDER — PNV NO.95/FERROUS FUM/FOLIC AC 28MG-0.8MG
TABLET ORAL
Qty: 30 TABLET | Refills: 1 | Status: SHIPPED | OUTPATIENT
Start: 2023-03-13

## 2023-03-17 ENCOUNTER — TELEPHONE (OUTPATIENT)
Dept: PRIMARY CARE CLINIC | Age: 68
End: 2023-03-17

## 2023-05-21 DIAGNOSIS — E11.42 TYPE 2 DIABETES MELLITUS WITH PERIPHERAL NEUROPATHY (HCC): ICD-10-CM

## 2023-05-22 RX ORDER — SITAGLIPTIN 50 MG/1
TABLET, FILM COATED ORAL
Qty: 90 TABLET | Refills: 0 | Status: SHIPPED | OUTPATIENT
Start: 2023-05-22

## 2023-05-24 ENCOUNTER — OFFICE VISIT (OUTPATIENT)
Dept: PRIMARY CARE CLINIC | Age: 68
End: 2023-05-24
Payer: MEDICARE

## 2023-05-24 VITALS
OXYGEN SATURATION: 96 % | SYSTOLIC BLOOD PRESSURE: 112 MMHG | WEIGHT: 113 LBS | DIASTOLIC BLOOD PRESSURE: 72 MMHG | BODY MASS INDEX: 21.35 KG/M2 | HEART RATE: 69 BPM

## 2023-05-24 DIAGNOSIS — R63.4 WEIGHT LOSS: ICD-10-CM

## 2023-05-24 DIAGNOSIS — E11.42 TYPE 2 DIABETES MELLITUS WITH PERIPHERAL NEUROPATHY (HCC): ICD-10-CM

## 2023-05-24 DIAGNOSIS — R80.9 MICROALBUMINURIA: ICD-10-CM

## 2023-05-24 DIAGNOSIS — I10 ESSENTIAL HYPERTENSION: ICD-10-CM

## 2023-05-24 DIAGNOSIS — D64.9 ANEMIA, UNSPECIFIED TYPE: ICD-10-CM

## 2023-05-24 DIAGNOSIS — Z23 NEED FOR PNEUMOCOCCAL VACCINATION: ICD-10-CM

## 2023-05-24 DIAGNOSIS — E78.2 MIXED HYPERLIPIDEMIA: ICD-10-CM

## 2023-05-24 DIAGNOSIS — Z23 NEED FOR TDAP VACCINATION: ICD-10-CM

## 2023-05-24 DIAGNOSIS — N18.31 STAGE 3A CHRONIC KIDNEY DISEASE (HCC): ICD-10-CM

## 2023-05-24 DIAGNOSIS — E11.42 TYPE 2 DIABETES MELLITUS WITH PERIPHERAL NEUROPATHY (HCC): Primary | ICD-10-CM

## 2023-05-24 LAB
ALBUMIN SERPL-MCNC: 4.6 G/DL (ref 3.4–5)
ALBUMIN/GLOB SERPL: 1.9 {RATIO} (ref 1.1–2.2)
ALP SERPL-CCNC: 46 U/L (ref 40–129)
ALT SERPL-CCNC: 13 U/L (ref 10–40)
ANION GAP SERPL CALCULATED.3IONS-SCNC: 12 MMOL/L (ref 3–16)
AST SERPL-CCNC: 27 U/L (ref 15–37)
BILIRUB SERPL-MCNC: 0.3 MG/DL (ref 0–1)
BUN SERPL-MCNC: 17 MG/DL (ref 7–20)
CALCIUM SERPL-MCNC: 10.3 MG/DL (ref 8.3–10.6)
CHLORIDE SERPL-SCNC: 103 MMOL/L (ref 99–110)
CHOLEST SERPL-MCNC: 177 MG/DL (ref 0–199)
CO2 SERPL-SCNC: 27 MMOL/L (ref 21–32)
CREAT SERPL-MCNC: 1.7 MG/DL (ref 0.6–1.2)
DEPRECATED RDW RBC AUTO: 13.7 % (ref 12.4–15.4)
GFR SERPLBLD CREATININE-BSD FMLA CKD-EPI: 33 ML/MIN/{1.73_M2}
GLUCOSE SERPL-MCNC: 91 MG/DL (ref 70–99)
HBA1C MFR BLD: 5.8 %
HCT VFR BLD AUTO: 29.2 % (ref 36–48)
HDLC SERPL-MCNC: 86 MG/DL (ref 40–60)
HGB BLD-MCNC: 9.5 G/DL (ref 12–16)
IRON SATN MFR SERPL: 14 % (ref 15–50)
IRON SERPL-MCNC: 52 UG/DL (ref 37–145)
LDLC SERPL CALC-MCNC: 72 MG/DL
MCH RBC QN AUTO: 28 PG (ref 26–34)
MCHC RBC AUTO-ENTMCNC: 32.7 G/DL (ref 31–36)
MCV RBC AUTO: 85.8 FL (ref 80–100)
PLATELET # BLD AUTO: 299 K/UL (ref 135–450)
PMV BLD AUTO: 7.6 FL (ref 5–10.5)
POTASSIUM SERPL-SCNC: 4.4 MMOL/L (ref 3.5–5.1)
PROT SERPL-MCNC: 7 G/DL (ref 6.4–8.2)
RBC # BLD AUTO: 3.4 M/UL (ref 4–5.2)
SODIUM SERPL-SCNC: 142 MMOL/L (ref 136–145)
TIBC SERPL-MCNC: 362 UG/DL (ref 260–445)
TRIGL SERPL-MCNC: 96 MG/DL (ref 0–150)
TSH SERPL DL<=0.005 MIU/L-ACNC: 2.92 UIU/ML (ref 0.27–4.2)
VLDLC SERPL CALC-MCNC: 19 MG/DL
WBC # BLD AUTO: 6.5 K/UL (ref 4–11)

## 2023-05-24 PROCEDURE — 3078F DIAST BP <80 MM HG: CPT | Performed by: INTERNAL MEDICINE

## 2023-05-24 PROCEDURE — G8427 DOCREV CUR MEDS BY ELIG CLIN: HCPCS | Performed by: INTERNAL MEDICINE

## 2023-05-24 PROCEDURE — 3044F HG A1C LEVEL LT 7.0%: CPT | Performed by: INTERNAL MEDICINE

## 2023-05-24 PROCEDURE — G8420 CALC BMI NORM PARAMETERS: HCPCS | Performed by: INTERNAL MEDICINE

## 2023-05-24 PROCEDURE — 1036F TOBACCO NON-USER: CPT | Performed by: INTERNAL MEDICINE

## 2023-05-24 PROCEDURE — G8399 PT W/DXA RESULTS DOCUMENT: HCPCS | Performed by: INTERNAL MEDICINE

## 2023-05-24 PROCEDURE — 3074F SYST BP LT 130 MM HG: CPT | Performed by: INTERNAL MEDICINE

## 2023-05-24 PROCEDURE — 3017F COLORECTAL CA SCREEN DOC REV: CPT | Performed by: INTERNAL MEDICINE

## 2023-05-24 PROCEDURE — 2022F DILAT RTA XM EVC RTNOPTHY: CPT | Performed by: INTERNAL MEDICINE

## 2023-05-24 PROCEDURE — 90677 PCV20 VACCINE IM: CPT | Performed by: INTERNAL MEDICINE

## 2023-05-24 PROCEDURE — 83036 HEMOGLOBIN GLYCOSYLATED A1C: CPT | Performed by: INTERNAL MEDICINE

## 2023-05-24 PROCEDURE — 1090F PRES/ABSN URINE INCON ASSESS: CPT | Performed by: INTERNAL MEDICINE

## 2023-05-24 PROCEDURE — 1123F ACP DISCUSS/DSCN MKR DOCD: CPT | Performed by: INTERNAL MEDICINE

## 2023-05-24 PROCEDURE — G0009 ADMIN PNEUMOCOCCAL VACCINE: HCPCS | Performed by: INTERNAL MEDICINE

## 2023-05-24 PROCEDURE — 99214 OFFICE O/P EST MOD 30 MIN: CPT | Performed by: INTERNAL MEDICINE

## 2023-05-24 RX ORDER — PNV NO.95/FERROUS FUM/FOLIC AC 28MG-0.8MG
1 TABLET ORAL
Qty: 90 TABLET | Refills: 1 | Status: SHIPPED | OUTPATIENT
Start: 2023-05-24

## 2023-05-24 RX ORDER — LISINOPRIL 5 MG/1
5 TABLET ORAL DAILY
Qty: 90 TABLET | Refills: 1 | Status: SHIPPED | OUTPATIENT
Start: 2023-05-24

## 2023-05-24 RX ORDER — ROSUVASTATIN CALCIUM 20 MG/1
20 TABLET, COATED ORAL DAILY
Qty: 90 TABLET | Refills: 1 | Status: SHIPPED | OUTPATIENT
Start: 2023-05-24

## 2023-05-24 SDOH — ECONOMIC STABILITY: HOUSING INSECURITY
IN THE LAST 12 MONTHS, WAS THERE A TIME WHEN YOU DID NOT HAVE A STEADY PLACE TO SLEEP OR SLEPT IN A SHELTER (INCLUDING NOW)?: NO

## 2023-05-24 SDOH — ECONOMIC STABILITY: FOOD INSECURITY: WITHIN THE PAST 12 MONTHS, THE FOOD YOU BOUGHT JUST DIDN'T LAST AND YOU DIDN'T HAVE MONEY TO GET MORE.: NEVER TRUE

## 2023-05-24 SDOH — ECONOMIC STABILITY: INCOME INSECURITY: HOW HARD IS IT FOR YOU TO PAY FOR THE VERY BASICS LIKE FOOD, HOUSING, MEDICAL CARE, AND HEATING?: NOT HARD AT ALL

## 2023-05-24 SDOH — ECONOMIC STABILITY: FOOD INSECURITY: WITHIN THE PAST 12 MONTHS, YOU WORRIED THAT YOUR FOOD WOULD RUN OUT BEFORE YOU GOT MONEY TO BUY MORE.: NEVER TRUE

## 2023-06-05 PROBLEM — N18.31 STAGE 3A CHRONIC KIDNEY DISEASE (HCC): Status: ACTIVE | Noted: 2023-06-05

## 2023-06-05 ASSESSMENT — ENCOUNTER SYMPTOMS
VOMITING: 0
RHINORRHEA: 0
CONSTIPATION: 0
SHORTNESS OF BREATH: 0
NAUSEA: 0
COUGH: 0
SORE THROAT: 0
BLOOD IN STOOL: 0
ABDOMINAL PAIN: 0
WHEEZING: 0
CHEST TIGHTNESS: 0
DIARRHEA: 0
SINUS PRESSURE: 0

## 2023-08-24 ENCOUNTER — OFFICE VISIT (OUTPATIENT)
Dept: PRIMARY CARE CLINIC | Age: 68
End: 2023-08-24
Payer: MEDICARE

## 2023-08-24 VITALS
HEART RATE: 74 BPM | HEIGHT: 61 IN | BODY MASS INDEX: 23.33 KG/M2 | OXYGEN SATURATION: 99 % | DIASTOLIC BLOOD PRESSURE: 72 MMHG | SYSTOLIC BLOOD PRESSURE: 128 MMHG | WEIGHT: 123.6 LBS

## 2023-08-24 DIAGNOSIS — R63.4 WEIGHT LOSS: ICD-10-CM

## 2023-08-24 DIAGNOSIS — D64.9 ANEMIA, UNSPECIFIED TYPE: ICD-10-CM

## 2023-08-24 DIAGNOSIS — E11.42 TYPE 2 DIABETES MELLITUS WITH PERIPHERAL NEUROPATHY (HCC): Primary | ICD-10-CM

## 2023-08-24 DIAGNOSIS — I10 ESSENTIAL HYPERTENSION: ICD-10-CM

## 2023-08-24 DIAGNOSIS — E78.2 MIXED HYPERLIPIDEMIA: ICD-10-CM

## 2023-08-24 DIAGNOSIS — Z23 NEED FOR TDAP VACCINATION: ICD-10-CM

## 2023-08-24 DIAGNOSIS — N18.32 STAGE 3B CHRONIC KIDNEY DISEASE (HCC): ICD-10-CM

## 2023-08-24 LAB — HBA1C MFR BLD: 6.9 %

## 2023-08-24 PROCEDURE — 99214 OFFICE O/P EST MOD 30 MIN: CPT | Performed by: INTERNAL MEDICINE

## 2023-08-24 PROCEDURE — 3078F DIAST BP <80 MM HG: CPT | Performed by: INTERNAL MEDICINE

## 2023-08-24 PROCEDURE — 3074F SYST BP LT 130 MM HG: CPT | Performed by: INTERNAL MEDICINE

## 2023-08-24 PROCEDURE — 83036 HEMOGLOBIN GLYCOSYLATED A1C: CPT | Performed by: INTERNAL MEDICINE

## 2023-08-24 PROCEDURE — 1123F ACP DISCUSS/DSCN MKR DOCD: CPT | Performed by: INTERNAL MEDICINE

## 2023-08-24 PROCEDURE — 1036F TOBACCO NON-USER: CPT | Performed by: INTERNAL MEDICINE

## 2023-08-24 PROCEDURE — G8399 PT W/DXA RESULTS DOCUMENT: HCPCS | Performed by: INTERNAL MEDICINE

## 2023-08-24 PROCEDURE — G8420 CALC BMI NORM PARAMETERS: HCPCS | Performed by: INTERNAL MEDICINE

## 2023-08-24 PROCEDURE — 1090F PRES/ABSN URINE INCON ASSESS: CPT | Performed by: INTERNAL MEDICINE

## 2023-08-24 PROCEDURE — 2022F DILAT RTA XM EVC RTNOPTHY: CPT | Performed by: INTERNAL MEDICINE

## 2023-08-24 PROCEDURE — 3044F HG A1C LEVEL LT 7.0%: CPT | Performed by: INTERNAL MEDICINE

## 2023-08-24 PROCEDURE — G8427 DOCREV CUR MEDS BY ELIG CLIN: HCPCS | Performed by: INTERNAL MEDICINE

## 2023-08-24 PROCEDURE — 3017F COLORECTAL CA SCREEN DOC REV: CPT | Performed by: INTERNAL MEDICINE

## 2023-08-24 NOTE — PATIENT INSTRUCTIONS
-Continue same medications  -Low sodium diet  -Low fat, low cholesterol diet  -Limit carbohydrates to 45 grams with meals and 15 grams with snacks  -monitor blood sugars  -goal for blood sugar fasting or pre-meal  is   -goal for blood sugar 2 hours after a meal is less than 180  -goal for blood sugar at bedtime is less than 150  -Regular aerobic exercise    -Avoid NSAID's such as Ibuprofen, Advil, Motrin, Naprosyn, and Aleve as well as prescription NSAID's  -Can take Acetaminophen (Tylenol) if needed for any pain

## 2023-09-08 DIAGNOSIS — E11.42 TYPE 2 DIABETES MELLITUS WITH PERIPHERAL NEUROPATHY (HCC): ICD-10-CM

## 2023-09-08 RX ORDER — SITAGLIPTIN 50 MG/1
TABLET, FILM COATED ORAL
Qty: 90 TABLET | Refills: 1 | Status: SHIPPED | OUTPATIENT
Start: 2023-09-08

## 2023-09-08 NOTE — TELEPHONE ENCOUNTER
Medication:   Requested Prescriptions     Pending Prescriptions Disp Refills    JANUVIA 50 MG tablet [Pharmacy Med Name: Januvia 50 MG Oral Tablet] 90 tablet 0     Sig: Take 1 tablet by mouth once daily     Last Filled:  5/22/23    Last appt: 8/24/2023   Next appt: 11/2/2023    Last Labs DM:   Lab Results   Component Value Date/Time    LABA1C 6.9 08/24/2023 02:39 PM     Last Lipid:   Lab Results   Component Value Date/Time    CHOL 177 05/24/2023 10:06 AM    TRIG 96 05/24/2023 10:06 AM    HDL 86 05/24/2023 10:06 AM    HDL 63 02/14/2012 10:10 AM    LDLCALC 72 05/24/2023 10:06 AM     Last PSA: No results found for: PSA  Last Thyroid:   Lab Results   Component Value Date/Time    TSH 2.92 05/24/2023 10:06 AM    T4FREE 1.1 05/06/2019 06:08 AM

## 2023-09-09 PROBLEM — N18.32 STAGE 3B CHRONIC KIDNEY DISEASE (HCC): Status: ACTIVE | Noted: 2023-09-09

## 2023-09-09 ASSESSMENT — ENCOUNTER SYMPTOMS
WHEEZING: 0
SINUS PRESSURE: 0
CHEST TIGHTNESS: 0
SHORTNESS OF BREATH: 0
RHINORRHEA: 0
BLOOD IN STOOL: 0
SORE THROAT: 0
VOMITING: 0
COUGH: 0
DIARRHEA: 0
CONSTIPATION: 0
ABDOMINAL PAIN: 0
NAUSEA: 0

## 2023-09-23 DIAGNOSIS — E11.42 TYPE 2 DIABETES MELLITUS WITH PERIPHERAL NEUROPATHY (HCC): ICD-10-CM

## 2023-09-25 NOTE — TELEPHONE ENCOUNTER
Medication:   Requested Prescriptions     Pending Prescriptions Disp Refills    metFORMIN (GLUCOPHAGE) 1000 MG tablet [Pharmacy Med Name: metFORMIN HCl 1000 MG Oral Tablet] 180 tablet 0     Sig: TAKE 1 TABLET BY MOUTH IN THE MORNING AND 1 TABLET IN THE EVENING WITH MEALS     Last Filled:  5/24/2023    Last appt: 8/24/2023   Next appt: 11/2/2023    Last Labs DM:   Lab Results   Component Value Date/Time    LABA1C 6.9 08/24/2023 02:39 PM

## 2023-11-02 ENCOUNTER — OFFICE VISIT (OUTPATIENT)
Dept: PRIMARY CARE CLINIC | Age: 68
End: 2023-11-02
Payer: MEDICARE

## 2023-11-02 VITALS
HEIGHT: 61 IN | WEIGHT: 125 LBS | BODY MASS INDEX: 23.6 KG/M2 | TEMPERATURE: 98 F | DIASTOLIC BLOOD PRESSURE: 82 MMHG | HEART RATE: 95 BPM | SYSTOLIC BLOOD PRESSURE: 122 MMHG | OXYGEN SATURATION: 99 % | RESPIRATION RATE: 16 BRPM

## 2023-11-02 DIAGNOSIS — Z00.00 INITIAL MEDICARE ANNUAL WELLNESS VISIT: Primary | ICD-10-CM

## 2023-11-02 DIAGNOSIS — Z23 NEED FOR INFLUENZA VACCINATION: ICD-10-CM

## 2023-11-02 DIAGNOSIS — R41.89 COGNITIVE IMPAIRMENT: ICD-10-CM

## 2023-11-02 DIAGNOSIS — E11.42 TYPE 2 DIABETES MELLITUS WITH PERIPHERAL NEUROPATHY (HCC): Primary | ICD-10-CM

## 2023-11-02 PROCEDURE — 90694 VACC AIIV4 NO PRSRV 0.5ML IM: CPT | Performed by: INTERNAL MEDICINE

## 2023-11-02 PROCEDURE — G0438 PPPS, INITIAL VISIT: HCPCS | Performed by: INTERNAL MEDICINE

## 2023-11-02 PROCEDURE — G0008 ADMIN INFLUENZA VIRUS VAC: HCPCS | Performed by: INTERNAL MEDICINE

## 2023-11-02 PROCEDURE — G8484 FLU IMMUNIZE NO ADMIN: HCPCS | Performed by: INTERNAL MEDICINE

## 2023-11-02 PROCEDURE — 3078F DIAST BP <80 MM HG: CPT | Performed by: INTERNAL MEDICINE

## 2023-11-02 PROCEDURE — 1123F ACP DISCUSS/DSCN MKR DOCD: CPT | Performed by: INTERNAL MEDICINE

## 2023-11-02 PROCEDURE — 3017F COLORECTAL CA SCREEN DOC REV: CPT | Performed by: INTERNAL MEDICINE

## 2023-11-02 PROCEDURE — 3074F SYST BP LT 130 MM HG: CPT | Performed by: INTERNAL MEDICINE

## 2023-11-02 ASSESSMENT — PATIENT HEALTH QUESTIONNAIRE - PHQ9
9. THOUGHTS THAT YOU WOULD BE BETTER OFF DEAD, OR OF HURTING YOURSELF: 0
SUM OF ALL RESPONSES TO PHQ QUESTIONS 1-9: 0
7. TROUBLE CONCENTRATING ON THINGS, SUCH AS READING THE NEWSPAPER OR WATCHING TELEVISION: 0
8. MOVING OR SPEAKING SO SLOWLY THAT OTHER PEOPLE COULD HAVE NOTICED. OR THE OPPOSITE, BEING SO FIGETY OR RESTLESS THAT YOU HAVE BEEN MOVING AROUND A LOT MORE THAN USUAL: 0
3. TROUBLE FALLING OR STAYING ASLEEP: 0
SUM OF ALL RESPONSES TO PHQ QUESTIONS 1-9: 0
10. IF YOU CHECKED OFF ANY PROBLEMS, HOW DIFFICULT HAVE THESE PROBLEMS MADE IT FOR YOU TO DO YOUR WORK, TAKE CARE OF THINGS AT HOME, OR GET ALONG WITH OTHER PEOPLE: 0
1. LITTLE INTEREST OR PLEASURE IN DOING THINGS: 0
2. FEELING DOWN, DEPRESSED OR HOPELESS: 0
SUM OF ALL RESPONSES TO PHQ QUESTIONS 1-9: 0
6. FEELING BAD ABOUT YOURSELF - OR THAT YOU ARE A FAILURE OR HAVE LET YOURSELF OR YOUR FAMILY DOWN: 0
4. FEELING TIRED OR HAVING LITTLE ENERGY: 0
SUM OF ALL RESPONSES TO PHQ9 QUESTIONS 1 & 2: 0
5. POOR APPETITE OR OVEREATING: 0
SUM OF ALL RESPONSES TO PHQ QUESTIONS 1-9: 0

## 2023-11-02 ASSESSMENT — LIFESTYLE VARIABLES
HOW OFTEN DO YOU HAVE A DRINK CONTAINING ALCOHOL: 2-4 TIMES A MONTH
HOW MANY STANDARD DRINKS CONTAINING ALCOHOL DO YOU HAVE ON A TYPICAL DAY: 1 OR 2

## 2023-11-20 PROBLEM — R41.89 COGNITIVE IMPAIRMENT: Status: ACTIVE | Noted: 2022-10-11

## 2023-12-15 DIAGNOSIS — E11.42 TYPE 2 DIABETES MELLITUS WITH PERIPHERAL NEUROPATHY (HCC): ICD-10-CM

## 2023-12-15 LAB
CREAT UR-MCNC: 108 MG/DL (ref 28–259)
MICROALBUMIN UR DL<=1MG/L-MCNC: 6 MG/DL
MICROALBUMIN/CREAT UR: 55.6 MG/G (ref 0–30)

## 2023-12-28 ENCOUNTER — OFFICE VISIT (OUTPATIENT)
Dept: PRIMARY CARE CLINIC | Age: 68
End: 2023-12-28
Payer: MEDICARE

## 2023-12-28 VITALS
TEMPERATURE: 97.6 F | SYSTOLIC BLOOD PRESSURE: 130 MMHG | WEIGHT: 125.4 LBS | DIASTOLIC BLOOD PRESSURE: 82 MMHG | RESPIRATION RATE: 16 BRPM | OXYGEN SATURATION: 100 % | BODY MASS INDEX: 23.68 KG/M2 | HEIGHT: 61 IN | HEART RATE: 88 BPM

## 2023-12-28 DIAGNOSIS — E11.42 TYPE 2 DIABETES MELLITUS WITH PERIPHERAL NEUROPATHY (HCC): Primary | ICD-10-CM

## 2023-12-28 DIAGNOSIS — I10 ESSENTIAL HYPERTENSION: ICD-10-CM

## 2023-12-28 DIAGNOSIS — Z12.31 ENCOUNTER FOR SCREENING MAMMOGRAM FOR BREAST CANCER: ICD-10-CM

## 2023-12-28 DIAGNOSIS — D64.9 ANEMIA, UNSPECIFIED TYPE: ICD-10-CM

## 2023-12-28 DIAGNOSIS — F03.90 DEMENTIA WITHOUT BEHAVIORAL DISTURBANCE, PSYCHOTIC DISTURBANCE, MOOD DISTURBANCE, OR ANXIETY, UNSPECIFIED DEMENTIA SEVERITY, UNSPECIFIED DEMENTIA TYPE (HCC): ICD-10-CM

## 2023-12-28 DIAGNOSIS — R63.4 WEIGHT LOSS: ICD-10-CM

## 2023-12-28 DIAGNOSIS — E78.2 MIXED HYPERLIPIDEMIA: ICD-10-CM

## 2023-12-28 LAB — HBA1C MFR BLD: 7 %

## 2023-12-28 PROCEDURE — G8399 PT W/DXA RESULTS DOCUMENT: HCPCS | Performed by: INTERNAL MEDICINE

## 2023-12-28 PROCEDURE — G8484 FLU IMMUNIZE NO ADMIN: HCPCS | Performed by: INTERNAL MEDICINE

## 2023-12-28 PROCEDURE — 1090F PRES/ABSN URINE INCON ASSESS: CPT | Performed by: INTERNAL MEDICINE

## 2023-12-28 PROCEDURE — 3075F SYST BP GE 130 - 139MM HG: CPT | Performed by: INTERNAL MEDICINE

## 2023-12-28 PROCEDURE — 3079F DIAST BP 80-89 MM HG: CPT | Performed by: INTERNAL MEDICINE

## 2023-12-28 PROCEDURE — G8427 DOCREV CUR MEDS BY ELIG CLIN: HCPCS | Performed by: INTERNAL MEDICINE

## 2023-12-28 PROCEDURE — 3017F COLORECTAL CA SCREEN DOC REV: CPT | Performed by: INTERNAL MEDICINE

## 2023-12-28 PROCEDURE — G8420 CALC BMI NORM PARAMETERS: HCPCS | Performed by: INTERNAL MEDICINE

## 2023-12-28 PROCEDURE — 1123F ACP DISCUSS/DSCN MKR DOCD: CPT | Performed by: INTERNAL MEDICINE

## 2023-12-28 PROCEDURE — 2022F DILAT RTA XM EVC RTNOPTHY: CPT | Performed by: INTERNAL MEDICINE

## 2023-12-28 PROCEDURE — 99214 OFFICE O/P EST MOD 30 MIN: CPT | Performed by: INTERNAL MEDICINE

## 2023-12-28 PROCEDURE — 83036 HEMOGLOBIN GLYCOSYLATED A1C: CPT | Performed by: INTERNAL MEDICINE

## 2023-12-28 PROCEDURE — 1036F TOBACCO NON-USER: CPT | Performed by: INTERNAL MEDICINE

## 2023-12-28 NOTE — TELEPHONE ENCOUNTER
Medication:   Requested Prescriptions     Pending Prescriptions Disp Refills    JANUVIA 100 MG tablet [Pharmacy Med Name: Januvia 100 MG Oral Tablet] 90 tablet 0     Sig: Take 1 tablet by mouth once daily       Last Filled:      Patient Phone Number: [phone] (home)     Last appt: 6/15/2020   Next appt: Visit date not found    Last Labs DM:   Lab Results   Component Value Date    LABA1C 7.3 03/03/2020       Last OARRS: No flowsheet data found.     Preferred Pharmacy:   [address] [place] [address]  Phone: [phone] Fax: [phone]  Kan Cespedes. Kelin Ronquillo, [address] [place] [address] [phone] - F [phone]  [place] [address]  Phone: [phone] Fax: [phone] n/a

## 2023-12-28 NOTE — PROGRESS NOTES
Date of Visit: 2023    Viktoriya Osorio (:  1955) is a 68 y.o. female,  Established patient here for evaluation of the following chief complaint(s):  Hypertension, Hyperlipidemia, Diabetes, and Other (Pt daughter would like to speak to provider outside room without mom)      ASSESSMENT/PLAN:    1. Type 2 diabetes mellitus with peripheral neuropathy (HCC)  -Hemoglobin A1c is 7.0% shows diabetes control is near goal  -Continue Metformin 100mg once daily  -Continue Januvia 50mg once daily  -Limit carbohydrates to 45 grams with meals and 15 grams with snacks  -monitor blood sugars  -goal for blood sugar fasting or pre-meal  is   -goal for blood sugar 2 hours after a meal is less than 180  -goal for blood sugar at bedtime is less than 150  -Regular aerobic exercise  -POCT glycosylated hemoglobin (Hb A1C)  -Have comprehensive metabolic panel done previously ordered    2. Essential hypertension  -stable  -Continue lisinopril (PRINIVIL;ZESTRIL) 5 MG tablet; Take 1 tablet by mouth daily    -Low sodium diet  -Regular aerobic exercise    3. Mixed hyperlipidemia  -stable  -LDL is at goal  -Continue rosuvastatin (CRESTOR) 20 MG tablet; Take 1 tablet by mouth daily   -Low fat, low cholesterol diet  -Regular aerobic exercise  -Had fasting lipid panel done previously ordered    4. Weight loss  -Improved and stable  -BMI improved to 23.69    5. Anemia, unspecified type  -Same  -Continue ferrous sulfate 325 mg once daily  -Have CBC and iron studies done previous ordered  -Follow-up with Hematology    6. Dementia without behavioral disturbance, psychotic disturbance, mood disturbance, or anxiety, unspecified dementia severity, unspecified dementia type (HCC)  -Worse  -Schedule with Neurology as previously recommended  -Referral for care coordination  -Family to contact [employer]zheim[employer] for resources and patient's daughter sa    7. Encounter for screening mammogram for breast cancer  - [facility]

## 2024-01-01 DIAGNOSIS — I10 ESSENTIAL HYPERTENSION: ICD-10-CM

## 2024-01-01 NOTE — PROGRESS NOTES
[facility]   Hospitalist Progress Note  Date: 2024  Patient Name: Danny Savage  : 1956  MRN: [id number]  Date of admission: 2023  [place]: [facility] [place]      Subjective   Subjective     Chief Complaint: Back pain    Summary:Danny Savage is a 67 y.o. male Danny Savage is a 67 y.o. male with past medical history of recently diagnosed renal cell carcinoma s/p partial left kidney nephrectomy in 2023 (follows Dr.O Espino as outpatient) and infrarenal aortic aneurysm (scan on  showing AAA of 4.7 cm) who presented with complaints of left lower back pain and fever since past 3 days.  Patient had CT abdomen scan done early December and was evaluated by urologist as outpatient.  Patient presented to ER 1 day prior with left flank pain where CT abdomen was obtained showing mildly decreased fluid collection at partial left nephrectomy bed containing decreasing but persistent small gas bubbles; findings consistent with postoperative seroma/hematoma; questionable infection.  Case was discussed with Dr. Spicer and patient was discharged home.  But patient's pain got worse on the day of presentation and was brought to ED.  Patient's left flank pain was severe, about 10 x 10 in intensity on presentation.  Patient was febrile with temp 102.6.  Lactic acid was normal.  Urinalysis was not significant for UTI.  CT abdomen pelvis with contrast showed stable fluid collection along the left nephrectomy bed, likely postoperative seroma/hematoma although infection cannot be ruled out. Abdominal aortic aneurysm of 5.1 cm.  Patient was started on IV antibiotics and was admitted as a case of possible abscess in the left nephrectomy bed.  Urology was consulted.     Hospital course complicated by sudden unresponsive on 2023.  Vital signs showed blood pressure 42/32.Patient was pale and clammy with agonal breathing.  No pulse was felt which seemed like PEA arrest after which CODE BLUE was  Bard Hopkins   Date ofBirth:  1955    Date of Visit:  10/19/2020    Chief Complaint   Patient presents with    Diabetes    Hyperlipidemia    Hypertension       HPI  Diabetes Mellitus Type 2 with neuropathy:  Pt has numbness and tingling of both feet. Current Medications: Metformin 1000mg po bid and Januvia 100mg po q day  Diet: low carbohydrate  Home blood sugar records: patient tests her blood sugar fasting and it averages   Any episodes of hypoglycemia? no  Eye exam current (within one year): yes on 5/15/20  Daily Aspirin? Yes 81mg po q day  Current exercise: walks 3-4 miles 3-4 times per week     Hypertension:    Current Medications: Lisinopril 5mg po q day  Home blood pressure monitoring: No  Diet: low salt     Hyperlipidemia:  Current medication: Rosuvastatin 20mg po qhs  Patient denies side effects  Diet: low fat, low cholesterol    Patient states she had fasting labs done this morning. Wt Readings from Last 3 Encounters:   10/19/20 123 lb 12.8 oz (56.2 kg)   06/04/20 115 lb (52.2 kg)   03/10/20 115 lb (52.2 kg)          Review of Systems   Constitutional: Negative for chills, fatigue and fever. HENT: Negative for congestion, postnasal drip, rhinorrhea, sinus pressure and sore throat. Eyes: Negative for visual disturbance. Respiratory: Negative for cough, chest tightness, shortness of breath and wheezing. Cardiovascular: Negative for chest pain, palpitations and leg swelling. Gastrointestinal: Negative for abdominal pain, blood in stool, constipation, diarrhea, nausea and vomiting. Genitourinary: Negative for dysuria, frequency and hematuria. Musculoskeletal: Negative for arthralgias and myalgias. Skin: Negative for rash and wound. Neurological: Positive for numbness. Negative for dizziness, tremors, syncope, weakness, light-headedness and headaches. Psychiatric/Behavioral: Negative for dysphoric mood and sleep disturbance. The patient is not nervous/anxious. No Known Allergies  Outpatient Medications Marked as Taking for the 10/19/20 encounter (Office Visit) with Leslie Perea MD   Medication Sig Dispense Refill    JANUVIA 100 MG tablet Take 1 tablet by mouth once daily 90 tablet 0    gabapentin (NEURONTIN) 100 MG capsule TAKE 1 CAPSULE BY MOUTH THREE TIMES DAILY FOR 30 DAYS 90 capsule 1    lisinopril (PRINIVIL;ZESTRIL) 5 MG tablet Take 1 tablet by mouth once daily 90 tablet 0    rosuvastatin (CRESTOR) 20 MG tablet Take 1 tablet by mouth once daily 90 tablet 0    blood glucose test strips (ONETOUCH ULTRA) strip USE 1 STRIP TO CHECK GLUCOSE TWICE DAILY AS NEEDED 200 each 2    metFORMIN (GLUCOPHAGE) 1000 MG tablet TAKE 1 TABLET BY MOUTH TWICE DAILY WITH MEALS 180 tablet 1    meloxicam (MOBIC) 7.5 MG tablet Take 1 tablet by mouth once daily 30 tablet 2    aspirin 81 MG tablet Take 1 tablet by mouth daily 30 tablet 11    Multiple Minerals TABS Take  by mouth. Vitals:    10/19/20 1514   BP: 116/82   Pulse: 68   Resp: 16   Temp: 97.4 °F (36.3 °C)   SpO2: 98%   Weight: 123 lb 12.8 oz (56.2 kg)     Body mass index is 23.39 kg/m². Physical Exam  Nursing note reviewed. Constitutional:       General: She is not in acute distress. Appearance: Normal appearance. She is well-developed. HENT:      Mouth/Throat:      Pharynx: Oropharynx is clear. Eyes:      General: Lids are normal.      Extraocular Movements: Extraocular movements intact. Conjunctiva/sclera: Conjunctivae normal.      Pupils: Pupils are equal, round, and reactive to light. Neck:      Musculoskeletal: Neck supple. Thyroid: No thyromegaly. Vascular: No carotid bruit. Cardiovascular:      Rate and Rhythm: Normal rate and regular rhythm. Heart sounds: Normal heart sounds, S1 normal and S2 normal. No murmur. No friction rub. No gallop. Pulmonary:      Effort: Pulmonary effort is normal. No respiratory distress. Breath sounds: Normal breath sounds. called.  Patient was getting IV fluid bolus.  Before starting CPR, patient had feeble pulse after which CPR was held.  Patient was then intubated at bedside. He was then transferred to critical care unit.  Given patient has expanding AAA  with eccentric mural thrombus/hematoma measuring up to 5.1 centimeter maximally on CT abdomen of 12/22; there is also concern for aortic rupture. CTA abdomen was done which showed aortic rupture with aortocaval fistula and large retroperitoneal hematoma. Vascular Surgeon was consulted immediately and patient underwent urgent Open repair of ruptured infrarenal abdominal aortic aneurysm with a tube graft and mobilization of the omentum with coverage of the graft using pedicled omental flap.  Patient taken back to the OR on 12/26/2023 to look for sources of blood loss from prior surgeries.  Graft was in good position, no obvious leak was identified, patient was found to have nonviable colon and patient underwent partial colectomy by general surgery.    Interval Followup: No acute events overnight, patient not feeling great today, did not sleep well overnight, had some confusion      Objective   Objective     Vitals:   Temp:  [98.9 °F (37.2 °C)-99.3 °F (37.4 °C)] 98.9 °F (37.2 °C)  Heart Rate:  [] 97  Resp:  [12-26] 24  BP: ()/(49-84) 104/69  Flow (L/min):  [2] 2    Physical Exam   GEN: No acute distress  HEENT: Moist mucous membranes  LUNGS: Equal chest rise bilaterally  CARDIAC: Regular rate and rhythm  NEURO: Moving all 4 extremities spontaneously  SKIN: No obvious breakdown      Result Review    Result Review:  I have personally reviewed these results:  [x]  Laboratory      Lab 01/01/24  0528 12/31/23  1544 12/31/23  0830 12/31/23  0524 12/30/23  1723 12/30/23  0537 12/29/23  1139 12/29/23  0540 12/28/23  2220 12/28/23  0911 12/28/23  0400 12/27/23  0644 12/27/23  0621 12/27/23  0551 12/26/23  1507   WBC 28.43*  --   --  26.82*  --  25.62*  --  29.90*  --   --   --    <  >  --    < > 12.18*   HEMOGLOBIN 7.9* 9.6*  --  7.7*   < > 6.6*  --  7.0*  --   --   --    < >  --    < > 8.6*   HEMATOCRIT 23.8* 29.2*  --  23.2*   < > 20.0*  --  21.2*  --   --   --    < >  --    < > 24.8*   PLATELETS 286  --   --  193  --  126*  --  96*  --   --   --    < >  --    < > 72*   NEUTROS ABS 21.46*  --   --  21.16*  --  23.31*  --  26.24*  --   --   --    < >  --   --   --    IMMATURE GRANS (ABS) 1.70*  --   --  1.24*  --   --   --  0.98*  --   --   --    < >  --   --   --    LYMPHS ABS 2.13  --   --  1.65  --   --   --  1.22  --   --   --    < >  --   --   --    MONOS ABS 2.82*  --   --  2.56*  --   --   --  1.23*  --   --   --    < >  --   --   --    EOS ABS 0.16  --   --  0.08  --  0.26  --  0.06  --   --   --    < >  --   --   --    MCV 86.2  --   --  89.9  --  89.7  --  89.1  --   --   --    < >  --    < > 86.1   PROCALCITONIN 13.44*  --   --   --   --   --   --  11.16*  --   --   --   --   --   --   --    LACTATE 2.0  --  2.4*  --   --   --   --   --   --   --   --   --   --   --  1.3   LACTATE, ARTERIAL  --   --   --   --   --   --   --   --   --  1.24  --    < >  --   --   --    PROTIME  --   --   --   --   --   --   --   --  13.7  --   --   --  14.1  --  14.9   APTT 89.9  --   --  100.9*  --  85.9   < > 81.8 33.6*  --    < >  --   --   --   --     < > = values in this interval not displayed.         Lab 01/01/24  0528 12/31/23  0524 12/30/23  1723 12/30/23  0537 12/28/23  1842 12/28/23  0911 12/28/23  0400 12/27/23  1017 12/27/23  0644   SODIUM 132* 132* 134* 133*   < >  --    < >  --   --    SODIUM, ARTERIAL  --   --   --   --   --  135.2*  --  138.7 138.6   POTASSIUM 4.7 4.2 3.7 5.2   < >  --    < >  --   --    CHLORIDE 95* 96* 98 99   < >  --    < >  --   --    CO2 19.3* 19.7* 22.8 21.7*   < >  --    < >  --   --    ANION GAP 17.7* 16.3* 13.2 12.3   < >  --    < >  --   --    BUN 56* 33* 15 28*   < >  --    < >  --   --    CREATININE 4.89* 3.46* 2.32* 3.47*   < >  --    < >  --   --    EGFR  No wheezing, rhonchi or rales. Abdominal:      General: Bowel sounds are normal. There is no distension. Palpations: Abdomen is soft. Tenderness: There is no abdominal tenderness. There is no rebound. Musculoskeletal:      Right lower leg: No edema. Left lower leg: No edema. Lymphadenopathy:      Head:      Right side of head: No submandibular adenopathy. Left side of head: No submandibular adenopathy. Skin:     Comments: No foot ulcers   Neurological:      Mental Status: She is alert and oriented to person, place, and time. Comments: Bilateral foot monofilament exam normal           No results found for this visit on 10/19/20. Lab Review   Orders Only on 05/15/2020   Component Date Value    Visual Acuity Distance R* 05/15/2020 20/30     Visual Acuity Distance L* 05/15/2020 20/30     Intraocular Pressure Eye 05/15/2020                      Value:17  17      Diabetic Retinopathy 05/15/2020 NEGATIVE     Cataracts 05/15/2020 POSITIVE     Glaucoma 05/15/2020 NEGATIVE          Assessment/Plan     1. Type 2 diabetes mellitus with peripheral neuropathy (HCC)  -Continue same medications  -Limit carbohydrates to 45 grams with meals and 15 grams with snacks  -monitor blood sugars  -Regular aerobic exercise  - MICROALBUMIN / CREATININE URINE RATIO  -  DIABETES FOOT EXAM    2. Essential hypertension  -stable  -Continue same medications  -Low sodium diet  -Regular aerobic exercise    3. Mixed hyperlipidemia  -Continue same medications  -Low fat, low cholesterol diet  -Regular aerobic exercise    4. Flu vaccine need  - INFLUENZA, QUADV, 3 YRS AND OLDER, IM PF, PREFILL SYR OR SDV, 0.5ML (AFLURIA QUADV, PF) given          Discussed medications with patient, who voiced understanding of their use and indications. All questions answered. Return in about 4 months (around 2/19/2021) for diabetes, hypertension, and hyperlipidemia. 12.4* 18.8* 30.4* 18.8*   < >  --    < >  --   --    GLUCOSE 101* 92 74 66   < >  --    < >  --   --    GLUCOSE, ARTERIAL  --   --   --   --   --  110*  --  99 86   CALCIUM 7.3* 7.8* 8.1* 7.5*   < >  --    < >  --   --    IONIZED CALCIUM  --   --   --   --   --  0.94*  --  0.94* 0.84*   MAGNESIUM 2.5* 2.3  --  2.2   < >  --    < >  --   --    PHOSPHORUS 5.8* 5.5*  --  2.6   < >  --    < >  --   --     < > = values in this interval not displayed.         Lab 01/01/24  0528 12/31/23  0524 12/30/23  1723   TOTAL PROTEIN 6.1 6.0 6.3   ALBUMIN 2.2* 2.3* 2.4*   GLOBULIN 3.9 3.7 3.9   ALT (SGPT) 36 44* 52*   AST (SGOT) 50* 58* 79*   BILIRUBIN 4.1* 3.5* 3.2*   ALK PHOS 112 124* 155*   LIPASE  --  14  --          Lab 12/30/23  0537 12/28/23  2220 12/27/23  0621 12/26/23  1507   PROBNP 30,309.0*  --   --   --    PROTIME  --  13.7 14.1 14.9   INR  --  1.03 1.07 1.15             Lab 12/30/23  0645 12/30/23  0537   IRON  --  21*   IRON SATURATION (TSAT)  --  13*   TIBC  --  164*   TRANSFERRIN  --  110*   ABO TYPING A  --    RH TYPING Negative  --    ANTIBODY SCREEN Negative  --          Lab 12/28/23  0911 12/27/23  1017 12/27/23  0644   PH, ARTERIAL 7.466* 7.189* 7.422   PCO2, ARTERIAL 33.5* 70.2* 35.7   PO2 ART 71.3* 65.0* 86.8   O2 SATURATION ART 93.2* 86.4* 95.0   FIO2 35 45 45   HCO3 ART 23.6 26.2* 22.7   BASE EXCESS ART 0.1 -2.8* -1.4   CARBOXYHEMOGLOBIN 0.3 0.3 0.2     Brief Urine Lab Results  (Last result in the past 365 days)        Color   Clarity   Blood   Leuk Est   Nitrite   Protein   CREAT   Urine HCG        12/23/23 2054 Yellow   Clear   Small (1+)   Negative   Negative   100 mg/dL (2+)                 [x]  Microbiology   Microbiology Results (last 10 days)       Procedure Component Value - Date/Time    Blood Culture - Blood, Arm, Left [[id number]]  (Normal) Collected: 12/29/23 1139    Lab Status: Preliminary result Specimen: Blood from Arm, Left Updated: 01/01/24 1215     Blood Culture No growth at 3 days    Blood  Culture - Blood, Chest, Left [[id number]]  (Normal) Collected: 12/29/23 1002    Lab Status: Preliminary result Specimen: Blood from Chest, Left Updated: 01/01/24 1030     Blood Culture No growth at 3 days    Respiratory Culture - Sputum, ET Suction [[id number]]  (Abnormal) Collected: 12/24/23 1638    Lab Status: Final result Specimen: Sputum from ET Suction Updated: 12/27/23 1040     Respiratory Culture Light growth (2+) Moraxella atlantae     Gram Stain Few (2+) Gram negative bacilli      Rare (1+) WBCs seen    Blood Culture - Blood, Arm, Left [[id number]]  (Normal) Collected: 12/24/23 1459    Lab Status: Final result Specimen: Blood from Arm, Left Updated: 12/29/23 1531     Blood Culture No growth at 5 days    Blood Culture - Blood, Arm, Right [[id number]]  (Normal) Collected: 12/23/23 2205    Lab Status: Final result Specimen: Blood from Arm, Right Updated: 12/28/23 2215     Blood Culture No growth at 5 days    Blood Culture - Blood, Arm, Left [[id number]]  (Normal) Collected: 12/23/23 2205    Lab Status: Final result Specimen: Blood from Arm, Left Updated: 12/28/23 2215     Blood Culture No growth at 5 days    COVID-19, FLU A/B, RSV PCR 1 HR TAT - Swab, Nasopharynx [[id number]]  (Normal) Collected: 12/23/23 2204    Lab Status: Final result Specimen: Swab from Nasopharynx Updated: 12/23/23 2303     COVID19 Not Detected     Influenza A PCR Not Detected     Influenza B PCR Not Detected     RSV, PCR Not Detected    Narrative:      Fact sheet for providers: https://www.fda.gov/media/359770/download    Fact sheet for patients: https://www.fda.gov/media/031910/download    Test performed by PCR.          [x]  Radiology  XR Chest 1 View    Result Date: 12/31/2023   Favorable progression is suggested radiographically with decreased atelectasis and/or infiltrate(s) bilaterally.  There may be decreased pleural effusions, as well.      Please note that portions of this note were completed with a voice recognition program.  SHIN BUSTAMANTE  MAYRA STEVENSON MD       Electronically Signed and Approved By: SHIN NUNEZ JR, MD on 12/31/2023 at 6:22              CT Abdomen Pelvis With Contrast    Result Date: 12/29/2023   1. Evidence of interval left hemicolectomy with colostomy placement as well as abdominal aortic aneurysm and aortic caval fistula repair since 12/24/2023.  There is a persistent extensive para-aortic right retroperitoneal hematoma without acute or new hemorrhage identified.  No loculated fluid or fluid and air collections are seen to indicate abscess.  2. Development of multiple splenic and bilateral renal infarcts new since 12/24/2023. 3. Small bilateral pleural effusions have increased in size since 12/24/2023.  There is also anasarca.    Report was called to [facility] nurse at 1651 hours on 12/29/2023.    COBY DEGROOT DO       Electronically Signed and Approved By: COBY DEGROOT DO on 12/29/2023 at 16:53             XR Chest 1 View    Result Date: 12/29/2023    1. Diffuse bilateral airspace opacities appear unchanged, which could reflect pulmonary edema or pneumonia. 2. Small bilateral pleural effusions.       GELY MONTE MD       Electronically Signed and Approved By: GELY MONTE MD on 12/29/2023 at 6:04             XR Chest 1 View    Result Date: 12/27/2023     Right jugular central venous catheter tip is in the right atrium.  No pneumothorax is seen.  ET tube and NG tube remain in place.  Bilateral airspace disease and pleural effusions are unchanged.       BENITO PRADHAN MD       Electronically Signed and Approved By: BENITO PRADHAN MD on 12/27/2023 at 13:21             XR Chest 1 View    Result Date: 12/27/2023    1. Endotracheal tube in good position. 2. Bilateral airspace opacities, which could reflect pulmonary edema or pneumonia. 3. Moderate bilateral pleural effusions.       GELY MONTE MD       Electronically Signed and Approved By: GELY MONTE MD on 12/27/2023 at 4:42             CT Angiogram Abdomen  Pelvis    Result Date: 12/24/2023    1. Development of aorto-caval fistula with large amount of active extravasation of hemorrhage into right abdomen and retroperitoneal space likely from a ruptured IVC.  Findings were discussed by Dr. Cervantes to vascular surgery at approximately 1845 hours. 2. Bibasilar consolidations within lung bases, which could reflect atelectasis, aspiration, and/or pneumonia. 3. Small fluid collection along left nephrectomy site is unchanged. 4. Air within urinary bladder, likely secondary to Rico catheterization.     GELY MONTE MD       Electronically Signed and Approved By: GELY MONTE MD on 12/24/2023 at 19:37             XR Abdomen KUB    Result Date: 12/24/2023    1. Mild prominence of small bowel loops within the mid abdomen.  The findings are nonspecific and may be related to adynamic ileus.  Developing bowel obstruction could have this appearance.  Recommend follow-up to ensure resolution.     GELY MONTOYA MD       Electronically Signed and Approved By: GELY MONTOYA MD on 12/24/2023 at 16:07             XR Chest 1 View    Result Date: 12/24/2023    1. The endotracheal tube distal tip is seen approximately 3-4 cm above the prosper. 2. The nasogastric tube is positioned in the stomach in the left upper quadrant. 3. The right IJ central line distal tip is noted near the SVC/right atrial junction.  The line is ready for use.  There is no pneumothorax. 4. Evidence for subtle ill-defined infiltrates throughout the mid to lower lungs bilaterally.  There may also be a small left pleural effusion.       GELY MONTOYA MD       Electronically Signed and Approved By: GELY MONTOYA MD on 12/24/2023 at 15:13            []  EKG/Telemetry   []  Cardiology/Vascular   []  Pathology  []  Old records  []  Other:    Assessment & Plan   Assessment / Plan     Assessment:  Ruptured infrarenal AAA s/p open repair  Hemorrhagic shock  Hypovolemic shock  Acute hypoxic respiratory failure  requiring mechanical ventilation  Septic shock with Streptococcus pyogenes bacteremia  SVT  History of RCC s/p partial nephrectomy on 11/13  Acute kidney injury  Clinically significant lactic acidosis  Chronically immunosuppressed on CellCept  Hypocalcemia  Hypokalemia    Plan:  Patient currently being managed in critical care unit; appreciate intensivist input.  Patient had spontaneous rupture of AAA s/p Open repair of ruptured infrarenal abdominal aortic aneurysm with a tube graft and mobilization of the omentum with coverage of the graft using pedicled omental flap on 12/24.  Vascular surgery following the patient; appreciate further input.,   Successfully extubated on 12/28/2023, on 2 L nasal cannula currently  Replace electrolytes as needed  Continue hemodialysis as needed  Back on norepinephrine this a.m., wean as blood pressure tolerates  Urology consulted and following, appreciate their recommendations  Continue Unasyn  Repeat blood cultures with no growth at 2 days, procalcitonin elevated on last check  CT scan of the abdomen pelvis personally reviewed, no obvious source of infection  Wean O2 as able  Start Lexapro for depression  Start trazodone for sleep  Discussed management plan with pulmonology     Discussed with RN.    DVT prophylaxis:  Medical and mechanical DVT prophylaxis orders are present.    CODE STATUS:   Level Of Support Discussed With: Patient  Code Status (Patient has no pulse and is not breathing): CPR (Attempt to Resuscitate)  Medical Interventions (Patient has pulse or is breathing): Full Support    Pt is critically ill in ICU with shock, blood loss anemia.  I have spent 32 minutes of critical care time reviewing previous documentation, reviewing all pertinent telemetry, labs, and imaging studies, examining the patient, modifying the care plan and discussing the patient´s condition and care plan with the consultants, available family and during rounds. This does not include any procedures  performed.      Electronically signed by Eric Martin MD, 01/01/24, 1:22 PM EST.

## 2024-01-02 RX ORDER — LISINOPRIL 5 MG/1
5 TABLET ORAL DAILY
Qty: 90 TABLET | Refills: 1 | Status: SHIPPED | OUTPATIENT
Start: 2024-01-02

## 2024-01-02 NOTE — TELEPHONE ENCOUNTER
Medication:   Requested Prescriptions     Pending Prescriptions Disp Refills    lisinopril (PRINIVIL;ZESTRIL) 5 MG tablet [Pharmacy Med Name: Lisinopril 5 MG Oral Tablet] 90 tablet 0     Sig: Take 1 tablet by mouth once daily     Last Filled:  5.24.23    Last appt: 12/28/2023   Next appt: Visit date not found    Last OARRS:        No data to display

## 2024-01-04 ENCOUNTER — TELEPHONE (OUTPATIENT)
Dept: PRIMARY CARE CLINIC | Age: 69
End: 2024-01-04

## 2024-01-04 PROBLEM — F03.90 DEMENTIA WITHOUT BEHAVIORAL DISTURBANCE, PSYCHOTIC DISTURBANCE, MOOD DISTURBANCE, OR ANXIETY (HCC): Status: ACTIVE | Noted: 2024-01-04

## 2024-01-04 ASSESSMENT — ENCOUNTER SYMPTOMS
NAUSEA: 0
SINUS PRESSURE: 0
RHINORRHEA: 0
COUGH: 0
CHEST TIGHTNESS: 0
ABDOMINAL PAIN: 0
VOMITING: 0
CONSTIPATION: 0
WHEEZING: 0
DIARRHEA: 0
SHORTNESS OF BREATH: 0
BLOOD IN STOOL: 0
SORE THROAT: 0

## 2024-01-06 DIAGNOSIS — D64.9 ANEMIA, UNSPECIFIED TYPE: ICD-10-CM

## 2024-01-08 ENCOUNTER — CARE COORDINATION (OUTPATIENT)
Dept: CARE COORDINATION | Age: 69
End: 2024-01-08

## 2024-01-08 NOTE — TELEPHONE ENCOUNTER
Medication:   Requested Prescriptions     Pending Prescriptions Disp Refills    SV IRON 325 (65 Fe) MG tablet [Pharmacy Med Name: SV Iron 325 MG Oral Tablet] 90 tablet 0     Sig: Take 1 tablet by mouth once daily with breakfast     Last Filled:  5.24.23    Last appt: 12/28/2023   Next appt: Visit date not found    Last OARRS:        No data to display

## 2024-01-08 NOTE — CARE COORDINATION
Ambulatory Care Coordination Note  2024    Patient Current Location:  Home: [address] [place] [id number]     ACM contacted the spouse/partner by telephone. Verified name and  with spouse/partner as identifiers. Provided introduction to self, and explanation of the ACM role.     Challenges to be reviewed by the provider   Additional needs identified to be addressed with provider: No  none               Method of communication with provider: chart routing.    ACM: Kimber Corrales RN     ACM completed outreach call to patient spouse Solis. Solis said he is doing okay at this time managing patients memory impairments. Solis said he does get the emails for Alzheimer's association at this time with different resources but has not utilized. Solis did express that he is concerned for the future if patient continues to decline cognitively. Solis said he would like to plan for the future but would like to keep the patient at home as long as possible. Solis said that patient is doing okay managing her DM and he sets out medication for patient everyday to take. Solis said he has a nanny cam in the home to monitor the patient closely which will alert him if she were to leave the home. Solis said at this time there is no concern for wandering. Solis said patient does well with her daily routing and he keeps that routine regular as patient does best with this. Solis had no other additional questions for ACM at this time.     Plan:    F/U call 2 weeks  Send out Memory care unit information  Complete enrollment into care management      Offered patient enrollment in the Remote Patient Monitoring (RPM) program for in-home monitoring:  Did not discuss at this time .    Lab Results       None            Care Coordination Interventions    Referral from Primary Care Provider: Yes  Suggested Interventions and Community Resources  Disease Association: In Process (Comment: Alzheimers Associations)  Zone Management

## 2024-01-08 NOTE — CARE COORDINATION
ACM placed call to spouse who is verified on HIPAA form. Spouse was driving at this time and will call ACM back when he is not driving.

## 2024-01-12 RX ORDER — FERROUS SULFATE 325(65) MG
1 TABLET ORAL
Qty: 90 TABLET | Refills: 0 | Status: SHIPPED | OUTPATIENT
Start: 2024-01-12

## 2024-01-15 DIAGNOSIS — D64.9 ANEMIA, UNSPECIFIED TYPE: ICD-10-CM

## 2024-01-15 DIAGNOSIS — E78.2 MIXED HYPERLIPIDEMIA: ICD-10-CM

## 2024-01-16 RX ORDER — FERROUS SULFATE 325(65) MG
1 TABLET ORAL
Qty: 90 TABLET | Refills: 0 | Status: SHIPPED | OUTPATIENT
Start: 2024-01-16

## 2024-01-16 RX ORDER — ROSUVASTATIN CALCIUM 20 MG/1
20 TABLET, COATED ORAL DAILY
Qty: 90 TABLET | Refills: 0 | Status: SHIPPED | OUTPATIENT
Start: 2024-01-16

## 2024-01-16 NOTE — TELEPHONE ENCOUNTER
Medication:   Requested Prescriptions     Pending Prescriptions Disp Refills    rosuvastatin (CRESTOR) 20 MG tablet [Pharmacy Med Name: Rosuvastatin Calcium 20 MG Oral Tablet] 90 tablet 0     Sig: Take 1 tablet by mouth once daily    SV IRON 325 (65 Fe) MG tablet [Pharmacy Med Name: SV Iron 325 MG Oral Tablet] 90 tablet 0     Sig: Take 1 tablet by mouth once daily with breakfast     Last Filled:  5.24.23 1.12.24    Last appt: 12/28/2023   Next appt: Visit date not found    Last Lipid:   Lab Results   Component Value Date/Time    CHOL 170 01/06/2024 11:11 AM    TRIG 82 01/06/2024 11:11 AM    HDL 73 01/06/2024 11:11 AM    HDL 63 02/14/2012 10:10 AM    LDLCALC 81 01/06/2024 11:11 AM

## 2024-01-29 ENCOUNTER — CARE COORDINATION (OUTPATIENT)
Dept: CARE COORDINATION | Age: 69
End: 2024-01-29

## 2024-02-07 ENCOUNTER — CARE COORDINATION (OUTPATIENT)
Dept: CARE COORDINATION | Age: 69
End: 2024-02-07

## 2024-02-21 ENCOUNTER — CARE COORDINATION (OUTPATIENT)
Dept: CARE COORDINATION | Age: 69
End: 2024-02-21

## 2024-02-21 NOTE — CARE COORDINATION
ACM called but patient did not answer. ACM left voice message with call back number provided. ACM will make no further outreach at this time and resolve episode d/t not being able to contact.

## 2024-04-11 DIAGNOSIS — E11.42 TYPE 2 DIABETES MELLITUS WITH PERIPHERAL NEUROPATHY (HCC): ICD-10-CM

## 2024-04-12 RX ORDER — SITAGLIPTIN 50 MG/1
TABLET, FILM COATED ORAL
Qty: 90 TABLET | Refills: 0 | Status: SHIPPED | OUTPATIENT
Start: 2024-04-12

## 2024-04-12 NOTE — TELEPHONE ENCOUNTER
Medication:   Requested Prescriptions     Pending Prescriptions Disp Refills    JANUVIA 50 MG tablet [Pharmacy Med Name: Januvia 50 MG Oral Tablet] 90 tablet 0     Sig: Take 1 tablet by mouth once daily     Last Filled:  9.8.23    Last appt: 12/28/2023   Next appt: Visit date not found    Last OARRS:        No data to display

## 2024-04-14 DIAGNOSIS — E11.42 TYPE 2 DIABETES MELLITUS WITH PERIPHERAL NEUROPATHY (HCC): ICD-10-CM

## 2024-04-15 RX ORDER — SITAGLIPTIN 50 MG/1
TABLET, FILM COATED ORAL
Qty: 90 TABLET | Refills: 0 | OUTPATIENT
Start: 2024-04-15

## 2024-04-15 NOTE — TELEPHONE ENCOUNTER
Medication:   Requested Prescriptions     Pending Prescriptions Disp Refills    JANUVIA 50 MG tablet [Pharmacy Med Name: Januvia 50 MG Oral Tablet] 90 tablet 0     Sig: Take 1 tablet by mouth once daily     Last Filled:  4.12.24 Duplicate Request    Last appt: 12/28/2023   Next appt: Visit date not found    Last OARRS:        No data to display

## 2024-04-19 DIAGNOSIS — I10 ESSENTIAL HYPERTENSION: ICD-10-CM

## 2024-04-19 DIAGNOSIS — D64.9 ANEMIA, UNSPECIFIED TYPE: ICD-10-CM

## 2024-04-19 DIAGNOSIS — E11.42 TYPE 2 DIABETES MELLITUS WITH PERIPHERAL NEUROPATHY (HCC): ICD-10-CM

## 2024-04-19 DIAGNOSIS — E78.2 MIXED HYPERLIPIDEMIA: ICD-10-CM

## 2024-04-21 DIAGNOSIS — E11.42 TYPE 2 DIABETES MELLITUS WITH PERIPHERAL NEUROPATHY (HCC): ICD-10-CM

## 2024-04-22 RX ORDER — SITAGLIPTIN 50 MG/1
TABLET, FILM COATED ORAL
Qty: 90 TABLET | Refills: 0 | OUTPATIENT
Start: 2024-04-22

## 2024-04-22 NOTE — TELEPHONE ENCOUNTER
Medication:   Requested Prescriptions     Pending Prescriptions Disp Refills    lisinopril (PRINIVIL;ZESTRIL) 5 MG tablet 90 tablet 1     Sig: Take 1 tablet by mouth daily    rosuvastatin (CRESTOR) 20 MG tablet 90 tablet 0     Sig: Take 1 tablet by mouth daily    ferrous sulfate (SV IRON) 325 (65 Fe) MG tablet 90 tablet 0     Sig: Take 1 tablet by mouth daily (with breakfast)    SITagliptin (JANUVIA) 50 MG tablet 90 tablet 0     Sig: Take 1 tablet by mouth daily     Last Filled:  1.2.24 1.16.24 1.16.24 4.12.24    Last appt: 12/28/2023   Next appt: 4/21/2024    Last OARRS:        No data to display

## 2024-04-23 ENCOUNTER — TELEPHONE (OUTPATIENT)
Dept: PRIMARY CARE CLINIC | Age: 69
End: 2024-04-23

## 2024-04-23 RX ORDER — FERROUS SULFATE 325(65) MG
1 TABLET ORAL
Qty: 90 TABLET | Refills: 0 | Status: SHIPPED | OUTPATIENT
Start: 2024-04-23

## 2024-04-23 RX ORDER — LISINOPRIL 5 MG/1
5 TABLET ORAL DAILY
Qty: 90 TABLET | Refills: 0 | Status: SHIPPED | OUTPATIENT
Start: 2024-04-23 | End: 2024-05-01

## 2024-04-23 RX ORDER — ROSUVASTATIN CALCIUM 20 MG/1
20 TABLET, COATED ORAL DAILY
Qty: 90 TABLET | Refills: 0 | Status: SHIPPED | OUTPATIENT
Start: 2024-04-23 | End: 2024-05-01

## 2024-04-23 NOTE — TELEPHONE ENCOUNTER
Pts  just called in and made appointment for 5/1/24 with Jennie to do a check up so wondering if you will refill her medicines now , please advise

## 2024-04-23 NOTE — TELEPHONE ENCOUNTER
Pt's  called requesting to discuss pt's refill with nurse or Dr Anderson. He said prescription is going to run out in the next 7 days and he is unable to bring his wife to the doctor before then.

## 2024-04-24 NOTE — PROGRESS NOTES
ENCOUNTER DATE: 5/1/2024     NAME: Viktoriya Osorio   AGE: 68 y.o.   GENDER: female   YOB: 1955    Chief Complaint   Patient presents with    Cholesterol Problem    Diabetes    Hypertension    Hand Pain       ASSESSMENT/PLAN:  1. Bilateral hand pain  Discussed differentials  Check x-ray of hands  Check EMG for possible nerve impingement.  Scheduling information given  - XR HAND RIGHT (MIN 3 VIEWS); Future  - XR HAND LEFT (MIN 3 VIEWS); Future  - [facility] - Justin Lara MD, (EMG, NCV), [facility]    2. Decreased  strength  - Justin Byrd MD, (EMG, NCV), [facility]    3. Type 2 diabetes mellitus with peripheral neuropathy (HCC)  Stable.  Previous labs reviewed  Continue current regimen  - metFORMIN (GLUCOPHAGE) 1000 MG tablet; TAKE 1 TABLET BY MOUTH IN THE MORNING  Dispense: 90 tablet; Refill: 0    4. Stage 3a chronic kidney disease (HCC)  Recommend to schedule appointment with nephrology as previously advised  Patient's  states he has nephrology contact info and will call to schedule appointment    5. Essential hypertension  Stable    6. Dementia without behavioral disturbance, psychotic disturbance, mood disturbance, or anxiety, unspecified dementia severity, unspecified dementia type (HCC)  Patient's  states that she is scheduled with a neurologist, but not for a few more months  No further concerns voiced today      Return in about 3 months (around 8/1/2024) for Diabetes, chronic visit.     HPI:   Patient is here for a chronic visit  Patient's  is present and contributes to history    DMII  Last A1c 7.0  On metformin 100 mg daily, Januvia 50 mg daily   helps lay out all medications and gives them to her.   Home glu: 95-110s.    HYPERTENSION  On lisinopril 5 mg daily  Stable.     LIPIDS  On Crestor 20 mg daily  Last FLP in [place]    NEURO  History of dementia. No significant changes. Sleeping ok.   Is scheduled with a neurologist, but is not for a

## 2024-05-01 ENCOUNTER — OFFICE VISIT (OUTPATIENT)
Dept: PRIMARY CARE CLINIC | Age: 69
End: 2024-05-01

## 2024-05-01 ENCOUNTER — HOSPITAL ENCOUNTER (OUTPATIENT)
Dept: GENERAL RADIOLOGY | Age: 69
Discharge: HOME | End: 2024-05-01
Payer: MEDICARE

## 2024-05-01 VITALS
HEART RATE: 74 BPM | TEMPERATURE: 97.1 F | WEIGHT: 127 LBS | RESPIRATION RATE: 12 BRPM | OXYGEN SATURATION: 99 % | SYSTOLIC BLOOD PRESSURE: 110 MMHG | DIASTOLIC BLOOD PRESSURE: 70 MMHG | BODY MASS INDEX: 24 KG/M2

## 2024-05-01 DIAGNOSIS — F03.90 DEMENTIA WITHOUT BEHAVIORAL DISTURBANCE, PSYCHOTIC DISTURBANCE, MOOD DISTURBANCE, OR ANXIETY, UNSPECIFIED DEMENTIA SEVERITY, UNSPECIFIED DEMENTIA TYPE (HCC): ICD-10-CM

## 2024-05-01 DIAGNOSIS — I10 ESSENTIAL HYPERTENSION: ICD-10-CM

## 2024-05-01 DIAGNOSIS — M79.641 BILATERAL HAND PAIN: Primary | ICD-10-CM

## 2024-05-01 DIAGNOSIS — N18.31 STAGE 3A CHRONIC KIDNEY DISEASE (HCC): ICD-10-CM

## 2024-05-01 DIAGNOSIS — M79.641 BILATERAL HAND PAIN: ICD-10-CM

## 2024-05-01 DIAGNOSIS — M79.642 BILATERAL HAND PAIN: ICD-10-CM

## 2024-05-01 DIAGNOSIS — R29.898 DECREASED GRIP STRENGTH: ICD-10-CM

## 2024-05-01 DIAGNOSIS — M19.041 OSTEOARTHRITIS OF RIGHT HAND, UNSPECIFIED OSTEOARTHRITIS TYPE: Primary | ICD-10-CM

## 2024-05-01 DIAGNOSIS — E78.2 MIXED HYPERLIPIDEMIA: ICD-10-CM

## 2024-05-01 DIAGNOSIS — E11.42 TYPE 2 DIABETES MELLITUS WITH PERIPHERAL NEUROPATHY (HCC): ICD-10-CM

## 2024-05-01 DIAGNOSIS — M79.642 BILATERAL HAND PAIN: Primary | ICD-10-CM

## 2024-05-01 PROCEDURE — 73130 X-RAY EXAM OF HAND: CPT

## 2024-05-01 RX ORDER — LISINOPRIL 5 MG/1
5 TABLET ORAL DAILY
Qty: 90 TABLET | Refills: 0 | Status: SHIPPED | OUTPATIENT
Start: 2024-05-01

## 2024-05-01 RX ORDER — ROSUVASTATIN CALCIUM 20 MG/1
20 TABLET, COATED ORAL DAILY
Qty: 90 TABLET | Refills: 0 | Status: SHIPPED | OUTPATIENT
Start: 2024-05-01

## 2024-05-01 ASSESSMENT — ENCOUNTER SYMPTOMS
CONSTIPATION: 0
RHINORRHEA: 0
COUGH: 0
ABDOMINAL PAIN: 0
VOMITING: 0
DIARRHEA: 0
NAUSEA: 0
BLOOD IN STOOL: 0
WHEEZING: 0
SORE THROAT: 0
SINUS PRESSURE: 0
SHORTNESS OF BREATH: 0
CHEST TIGHTNESS: 0

## 2024-05-01 ASSESSMENT — PATIENT HEALTH QUESTIONNAIRE - PHQ9
SUM OF ALL RESPONSES TO PHQ QUESTIONS 1-9: 0
SUM OF ALL RESPONSES TO PHQ QUESTIONS 1-9: 0
SUM OF ALL RESPONSES TO PHQ9 QUESTIONS 1 & 2: 0
SUM OF ALL RESPONSES TO PHQ QUESTIONS 1-9: 0
2. FEELING DOWN, DEPRESSED OR HOPELESS: NOT AT ALL
SUM OF ALL RESPONSES TO PHQ QUESTIONS 1-9: 0
1. LITTLE INTEREST OR PLEASURE IN DOING THINGS: NOT AT ALL

## 2024-05-01 NOTE — TELEPHONE ENCOUNTER
Medication:   Requested Prescriptions     Pending Prescriptions Disp Refills    rosuvastatin (CRESTOR) 20 MG tablet [Pharmacy Med Name: Rosuvastatin Calcium 20 MG Oral Tablet] 90 tablet 0     Sig: Take 1 tablet by mouth once daily    lisinopril (PRINIVIL;ZESTRIL) 5 MG tablet [Pharmacy Med Name: Lisinopril 5 MG Oral Tablet] 90 tablet 0     Sig: Take 1 tablet by mouth once daily     Last Filled:  4.23.24-duplicate request     Last appt: 12/28/2023   Next appt: 5/1/2024    Last OARRS:        No data to display

## 2024-05-07 ENCOUNTER — TELEPHONE (OUTPATIENT)
Dept: PRIMARY CARE CLINIC | Age: 69
End: 2024-05-07

## 2024-05-07 NOTE — TELEPHONE ENCOUNTER
Patient spouse - Solis calling back regarding whether [facility] paperwork has been received.  Please return call to patient: [phone]

## 2024-05-07 NOTE — TELEPHONE ENCOUNTER
[employer] called and said that they faxed over a form on 5/1 for a Freestyle Del 2 that just needs a signature from Dr. Anderson. I advised them to resend it just in case we have not received it. He asked me to inform that this needs done urgently so please be on the lookout for 2 pages from [employer].

## 2024-05-07 NOTE — TELEPHONE ENCOUNTER
Patient's spouse informed by MA that paperwork was received and due date is 5/9/24. He will be contacted when paperwork is completed and faxed.

## 2024-05-09 ENCOUNTER — TELEPHONE (OUTPATIENT)
Dept: PRIMARY CARE CLINIC | Age: 69
End: 2024-05-09

## 2024-05-09 NOTE — TELEPHONE ENCOUNTER
[employer] called  for patient and wanted to know the status of the fax ( Freestyle Del 2 ) sent by them for Dr Anderson 's signature     Pl review and adcvice    Thanks    Fax--[phone]

## 2024-06-21 DIAGNOSIS — F03.90 DEMENTIA WITHOUT BEHAVIORAL DISTURBANCE, PSYCHOTIC DISTURBANCE, MOOD DISTURBANCE, OR ANXIETY, UNSPECIFIED DEMENTIA SEVERITY, UNSPECIFIED DEMENTIA TYPE (HCC): Primary | ICD-10-CM

## 2024-08-02 ENCOUNTER — OFFICE VISIT (OUTPATIENT)
Dept: PRIMARY CARE CLINIC | Age: 69
End: 2024-08-02

## 2024-08-02 VITALS
DIASTOLIC BLOOD PRESSURE: 78 MMHG | BODY MASS INDEX: 23.28 KG/M2 | WEIGHT: 123.2 LBS | SYSTOLIC BLOOD PRESSURE: 112 MMHG | HEART RATE: 67 BPM | OXYGEN SATURATION: 98 %

## 2024-08-02 DIAGNOSIS — E11.42 TYPE 2 DIABETES MELLITUS WITH PERIPHERAL NEUROPATHY (HCC): ICD-10-CM

## 2024-08-02 DIAGNOSIS — E11.42 TYPE 2 DIABETES MELLITUS WITH PERIPHERAL NEUROPATHY (HCC): Primary | ICD-10-CM

## 2024-08-02 DIAGNOSIS — I10 ESSENTIAL HYPERTENSION: ICD-10-CM

## 2024-08-02 DIAGNOSIS — G30.0 ALZHEIMER'S DISEASE WITH EARLY ONSET (HCC): ICD-10-CM

## 2024-08-02 DIAGNOSIS — N18.32 STAGE 3B CHRONIC KIDNEY DISEASE (HCC): ICD-10-CM

## 2024-08-02 DIAGNOSIS — F02.80 ALZHEIMER'S DISEASE WITH EARLY ONSET (HCC): ICD-10-CM

## 2024-08-02 DIAGNOSIS — D64.9 ANEMIA, UNSPECIFIED TYPE: ICD-10-CM

## 2024-08-02 DIAGNOSIS — E78.2 MIXED HYPERLIPIDEMIA: ICD-10-CM

## 2024-08-02 LAB
ALBUMIN SERPL-MCNC: 4.7 G/DL (ref 3.4–5)
ALBUMIN/GLOB SERPL: 1.7 {RATIO} (ref 1.1–2.2)
ALP SERPL-CCNC: 50 U/L (ref 40–129)
ALT SERPL-CCNC: 13 U/L (ref 10–40)
ANION GAP SERPL CALCULATED.3IONS-SCNC: 13 MMOL/L (ref 3–16)
AST SERPL-CCNC: 24 U/L (ref 15–37)
BILIRUB SERPL-MCNC: 0.3 MG/DL (ref 0–1)
BUN SERPL-MCNC: 21 MG/DL (ref 7–20)
CALCIUM SERPL-MCNC: 10.1 MG/DL (ref 8.3–10.6)
CHLORIDE SERPL-SCNC: 104 MMOL/L (ref 99–110)
CHOLEST SERPL-MCNC: 156 MG/DL (ref 0–199)
CO2 SERPL-SCNC: 24 MMOL/L (ref 21–32)
CREAT SERPL-MCNC: 1.9 MG/DL (ref 0.6–1.2)
DEPRECATED RDW RBC AUTO: 13.7 % (ref 12.4–15.4)
GFR SERPLBLD CREATININE-BSD FMLA CKD-EPI: 29 ML/MIN/{1.73_M2}
GLUCOSE SERPL-MCNC: 99 MG/DL (ref 70–99)
HBA1C MFR BLD: 7 %
HCT VFR BLD AUTO: 28.8 % (ref 36–48)
HDLC SERPL-MCNC: 66 MG/DL (ref 40–60)
HGB BLD-MCNC: 9.6 G/DL (ref 12–16)
IRON SATN MFR SERPL: 25 % (ref 15–50)
IRON SERPL-MCNC: 82 UG/DL (ref 37–145)
LDLC SERPL CALC-MCNC: 72 MG/DL
MCH RBC QN AUTO: 28 PG (ref 26–34)
MCHC RBC AUTO-ENTMCNC: 33.5 G/DL (ref 31–36)
MCV RBC AUTO: 83.7 FL (ref 80–100)
PLATELET # BLD AUTO: 292 K/UL (ref 135–450)
PMV BLD AUTO: 7.2 FL (ref 5–10.5)
POTASSIUM SERPL-SCNC: 4.6 MMOL/L (ref 3.5–5.1)
PROT SERPL-MCNC: 7.5 G/DL (ref 6.4–8.2)
RBC # BLD AUTO: 3.44 M/UL (ref 4–5.2)
SODIUM SERPL-SCNC: 141 MMOL/L (ref 136–145)
TIBC SERPL-MCNC: 333 UG/DL (ref 260–445)
TRIGL SERPL-MCNC: 88 MG/DL (ref 0–150)
VLDLC SERPL CALC-MCNC: 18 MG/DL
WBC # BLD AUTO: 6.7 K/UL (ref 4–11)

## 2024-08-02 SDOH — ECONOMIC STABILITY: FOOD INSECURITY: WITHIN THE PAST 12 MONTHS, YOU WORRIED THAT YOUR FOOD WOULD RUN OUT BEFORE YOU GOT MONEY TO BUY MORE.: NEVER TRUE

## 2024-08-02 SDOH — ECONOMIC STABILITY: FOOD INSECURITY: WITHIN THE PAST 12 MONTHS, THE FOOD YOU BOUGHT JUST DIDN'T LAST AND YOU DIDN'T HAVE MONEY TO GET MORE.: NEVER TRUE

## 2024-08-02 SDOH — ECONOMIC STABILITY: INCOME INSECURITY: HOW HARD IS IT FOR YOU TO PAY FOR THE VERY BASICS LIKE FOOD, HOUSING, MEDICAL CARE, AND HEATING?: NOT HARD AT ALL

## 2024-08-02 NOTE — PROGRESS NOTES
Physical Exam  Nursing note reviewed.   Constitutional:       General: She is not in acute distress.     Appearance: Normal appearance. She is well-developed.   HENT:      Head: Normocephalic and atraumatic.      Mouth/Throat:      Pharynx: Oropharynx is clear.   Eyes:      General: Lids are normal.      Extraocular Movements: Extraocular movements intact.      Conjunctiva/sclera: Conjunctivae normal.      Pupils: Pupils are equal, round, and reactive to light.   Neck:      Thyroid: No thyromegaly.      Vascular: No carotid bruit.   Cardiovascular:      Rate and Rhythm: Normal rate and regular rhythm.      Heart sounds: Normal heart sounds, S1 normal and S2 normal. No murmur heard.  Pulmonary:      Effort: Pulmonary effort is normal. No respiratory distress.      Breath sounds: Normal breath sounds. No wheezing, rhonchi or rales.   Abdominal:      General: Bowel sounds are normal. There is no distension.      Palpations: Abdomen is soft.      Tenderness: There is no abdominal tenderness. There is no rebound.   Musculoskeletal:      Cervical back: Neck supple.      Right lower leg: No edema.      Left lower leg: No edema.   Lymphadenopathy:      Head:      Right side of head: No submandibular adenopathy.      Left side of head: No submandibular adenopathy.   Skin:     Comments: No foot ulcers   Neurological:      Mental Status: She is alert and oriented to person, place, and time.      Comments: Bilateral foot monofilament exam normal   Psychiatric:         Mood and Affect: Mood normal.           Lab Review   Results for POC orders placed in visit on 08/02/24   POCT glycosylated hemoglobin (Hb A1C)   Result Value Ref Range    Hemoglobin A1C 7.0 %     Abstract on 02/27/2024   Component Date Value    Diabetic Retinopathy 05/20/2022 Negative            Medications, Allergies, Social history, Medical history, and results reviewed      An electronic signature was used to authenticate this note.    -Jessica Anderson

## 2024-08-09 DIAGNOSIS — E11.42 TYPE 2 DIABETES MELLITUS WITH PERIPHERAL NEUROPATHY (HCC): ICD-10-CM

## 2024-08-09 DIAGNOSIS — I10 ESSENTIAL HYPERTENSION: ICD-10-CM

## 2024-08-09 RX ORDER — LISINOPRIL 5 MG/1
5 TABLET ORAL DAILY
Qty: 90 TABLET | Refills: 1 | Status: SHIPPED | OUTPATIENT
Start: 2024-08-09

## 2024-08-09 NOTE — TELEPHONE ENCOUNTER
Medication:   Requested Prescriptions     Pending Prescriptions Disp Refills    lisinopril (PRINIVIL;ZESTRIL) 5 MG tablet 90 tablet 0     Sig: Take 1 tablet by mouth daily    SITagliptin (JANUVIA) 100 MG tablet 90 tablet 1     Sig: Take 1 tablet by mouth daily     Last Filled:  05/01/2024, 08/02/2024    Last appt: 8/2/2024   Next appt: 11/1/2024    Last OARRS:        No data to display

## 2024-08-11 DIAGNOSIS — E11.42 TYPE 2 DIABETES MELLITUS WITH PERIPHERAL NEUROPATHY (HCC): ICD-10-CM

## 2024-08-12 RX ORDER — SITAGLIPTIN 50 MG/1
TABLET, FILM COATED ORAL
Qty: 90 TABLET | Refills: 0 | OUTPATIENT
Start: 2024-08-12

## 2024-08-12 NOTE — TELEPHONE ENCOUNTER
Medication:   Requested Prescriptions     Pending Prescriptions Disp Refills    JANUVIA 50 MG tablet [Pharmacy Med Name: Januvia 50 MG Oral Tablet] 90 tablet 0     Sig: Take 1 tablet by mouth once daily     Last Filled:  8.2.24    Last appt: 8/2/2024   Next appt: 11/1/2024    Last Labs DM:   Lab Results   Component Value Date/Time    LABA1C 7.0 08/02/2024 09:08 AM

## 2024-08-17 ASSESSMENT — ENCOUNTER SYMPTOMS
CHEST TIGHTNESS: 0
COUGH: 0
SHORTNESS OF BREATH: 0
ABDOMINAL PAIN: 0
NAUSEA: 0
SINUS PRESSURE: 0
CONSTIPATION: 0
RHINORRHEA: 0
BLOOD IN STOOL: 0
DIARRHEA: 0
SORE THROAT: 0
VOMITING: 0
WHEEZING: 0

## 2024-08-17 NOTE — ASSESSMENT & PLAN NOTE
-stable  -Continue lisinopril (PRINIVIL;ZESTRIL) 5 MG tablet; Take 1 tablet by mouth daily    -Low sodium diet  -Regular aerobic exercise

## 2024-08-17 NOTE — ASSESSMENT & PLAN NOTE
-stable  -LDL is at goal  -Continue rosuvastatin (CRESTOR) 20 MG tablet; Take 1 tablet by mouth daily   -Low fat, low cholesterol diet  -Regular aerobic exercise

## 2024-08-17 NOTE — ASSESSMENT & PLAN NOTE
-Hemoglobin A1c is 7.0% shows diabetes control is near goal  -Continue Metformin 100mg once daily  -Increase Januvia to 100mg once daily  -Limit carbohydrates to 45 grams with meals and 15 grams with snacks  -monitor blood sugars  -goal for blood sugar fasting or pre-meal  is   -goal for blood sugar 2 hours after a meal is less than 180  -goal for blood sugar at bedtime is less than 150  -Regular aerobic exercise  -POCT glycosylated hemoglobin (Hb A1C)   -Referral to Ophthalmology for diabetic eye exam

## 2024-08-18 DIAGNOSIS — D64.9 ANEMIA, UNSPECIFIED TYPE: ICD-10-CM

## 2024-08-19 NOTE — TELEPHONE ENCOUNTER
Medication:   Requested Prescriptions     Pending Prescriptions Disp Refills    ferrous sulfate (SV IRON) 325 (65 Fe) MG tablet 90 tablet 0     Sig: Take 1 tablet by mouth daily (with breakfast)     Last Filled:  4/23/24    Last appt: 8/2/2024   Next appt: 11/1/2024    Last OARRS:        No data to display

## 2024-08-20 RX ORDER — FERROUS SULFATE 325(65) MG
1 TABLET ORAL
Qty: 90 TABLET | Refills: 1 | Status: SHIPPED | OUTPATIENT
Start: 2024-08-20

## 2024-09-01 DIAGNOSIS — E11.42 TYPE 2 DIABETES MELLITUS WITH PERIPHERAL NEUROPATHY (HCC): ICD-10-CM

## 2024-09-03 NOTE — TELEPHONE ENCOUNTER
Medication:   Requested Prescriptions     Pending Prescriptions Disp Refills    metFORMIN (GLUCOPHAGE) 1000 MG tablet [Pharmacy Med Name: metFORMIN HCl 1000 MG Oral Tablet] 90 tablet 0     Sig: TAKE 1 TABLET BY MOUTH IN THE MORNING     Last Filled:  5/1/24    Last appt: 8/2/2024   Next appt: 11/1/2024    Last Labs DM:   Lab Results   Component Value Date/Time    LABA1C 7.0 08/02/2024 09:08 AM

## 2024-11-01 ENCOUNTER — OFFICE VISIT (OUTPATIENT)
Dept: PRIMARY CARE CLINIC | Age: 69
End: 2024-11-01
Payer: MEDICARE

## 2024-11-01 VITALS
WEIGHT: 113 LBS | OXYGEN SATURATION: 100 % | BODY MASS INDEX: 21.34 KG/M2 | RESPIRATION RATE: 16 BRPM | HEIGHT: 61 IN | SYSTOLIC BLOOD PRESSURE: 128 MMHG | TEMPERATURE: 96.8 F | DIASTOLIC BLOOD PRESSURE: 78 MMHG | HEART RATE: 111 BPM

## 2024-11-01 DIAGNOSIS — G30.0 ALZHEIMER'S DISEASE WITH EARLY ONSET (HCC): ICD-10-CM

## 2024-11-01 DIAGNOSIS — N18.32 STAGE 3B CHRONIC KIDNEY DISEASE (HCC): ICD-10-CM

## 2024-11-01 DIAGNOSIS — D64.9 ANEMIA, UNSPECIFIED TYPE: ICD-10-CM

## 2024-11-01 DIAGNOSIS — F02.80 ALZHEIMER'S DISEASE WITH EARLY ONSET (HCC): ICD-10-CM

## 2024-11-01 DIAGNOSIS — E11.42 TYPE 2 DIABETES MELLITUS WITH PERIPHERAL NEUROPATHY (HCC): Primary | ICD-10-CM

## 2024-11-01 DIAGNOSIS — I10 ESSENTIAL HYPERTENSION: ICD-10-CM

## 2024-11-01 DIAGNOSIS — R63.4 WEIGHT LOSS: ICD-10-CM

## 2024-11-01 DIAGNOSIS — Z23 NEED FOR INFLUENZA VACCINATION: ICD-10-CM

## 2024-11-01 DIAGNOSIS — E78.2 MIXED HYPERLIPIDEMIA: ICD-10-CM

## 2024-11-01 PROCEDURE — 1123F ACP DISCUSS/DSCN MKR DOCD: CPT | Performed by: INTERNAL MEDICINE

## 2024-11-01 PROCEDURE — G0008 ADMIN INFLUENZA VIRUS VAC: HCPCS | Performed by: INTERNAL MEDICINE

## 2024-11-01 PROCEDURE — 90653 IIV ADJUVANT VACCINE IM: CPT | Performed by: INTERNAL MEDICINE

## 2024-11-01 PROCEDURE — 1036F TOBACCO NON-USER: CPT | Performed by: INTERNAL MEDICINE

## 2024-11-01 PROCEDURE — G8399 PT W/DXA RESULTS DOCUMENT: HCPCS | Performed by: INTERNAL MEDICINE

## 2024-11-01 PROCEDURE — 3078F DIAST BP <80 MM HG: CPT | Performed by: INTERNAL MEDICINE

## 2024-11-01 PROCEDURE — 3051F HG A1C>EQUAL 7.0%<8.0%: CPT | Performed by: INTERNAL MEDICINE

## 2024-11-01 PROCEDURE — 2022F DILAT RTA XM EVC RTNOPTHY: CPT | Performed by: INTERNAL MEDICINE

## 2024-11-01 PROCEDURE — G8427 DOCREV CUR MEDS BY ELIG CLIN: HCPCS | Performed by: INTERNAL MEDICINE

## 2024-11-01 PROCEDURE — G8420 CALC BMI NORM PARAMETERS: HCPCS | Performed by: INTERNAL MEDICINE

## 2024-11-01 PROCEDURE — 3074F SYST BP LT 130 MM HG: CPT | Performed by: INTERNAL MEDICINE

## 2024-11-01 PROCEDURE — G8482 FLU IMMUNIZE ORDER/ADMIN: HCPCS | Performed by: INTERNAL MEDICINE

## 2024-11-01 PROCEDURE — 3017F COLORECTAL CA SCREEN DOC REV: CPT | Performed by: INTERNAL MEDICINE

## 2024-11-01 PROCEDURE — 1090F PRES/ABSN URINE INCON ASSESS: CPT | Performed by: INTERNAL MEDICINE

## 2024-11-01 PROCEDURE — 99214 OFFICE O/P EST MOD 30 MIN: CPT | Performed by: INTERNAL MEDICINE

## 2024-11-01 RX ORDER — DONEPEZIL HYDROCHLORIDE 5 MG/1
5 TABLET, FILM COATED ORAL NIGHTLY
Qty: 30 TABLET | Refills: 1 | Status: SHIPPED | OUTPATIENT
Start: 2024-11-01

## 2024-11-01 SDOH — ECONOMIC STABILITY: FOOD INSECURITY: WITHIN THE PAST 12 MONTHS, THE FOOD YOU BOUGHT JUST DIDN'T LAST AND YOU DIDN'T HAVE MONEY TO GET MORE.: NEVER TRUE

## 2024-11-01 SDOH — ECONOMIC STABILITY: FOOD INSECURITY: WITHIN THE PAST 12 MONTHS, YOU WORRIED THAT YOUR FOOD WOULD RUN OUT BEFORE YOU GOT MONEY TO BUY MORE.: NEVER TRUE

## 2024-11-01 SDOH — ECONOMIC STABILITY: INCOME INSECURITY: HOW HARD IS IT FOR YOU TO PAY FOR THE VERY BASICS LIKE FOOD, HOUSING, MEDICAL CARE, AND HEATING?: NOT HARD AT ALL

## 2024-11-01 ASSESSMENT — PATIENT HEALTH QUESTIONNAIRE - PHQ9
SUM OF ALL RESPONSES TO PHQ QUESTIONS 1-9: 0
SUM OF ALL RESPONSES TO PHQ QUESTIONS 1-9: 0
SUM OF ALL RESPONSES TO PHQ9 QUESTIONS 1 & 2: 0
1. LITTLE INTEREST OR PLEASURE IN DOING THINGS: NOT AT ALL
SUM OF ALL RESPONSES TO PHQ QUESTIONS 1-9: 0
SUM OF ALL RESPONSES TO PHQ QUESTIONS 1-9: 0
2. FEELING DOWN, DEPRESSED OR HOPELESS: NOT AT ALL

## 2024-11-01 NOTE — PROGRESS NOTES
Date of Visit: 2024    Viktoriya Osorio (:  1955) is a 68 y.o. female,  Established patient here for evaluation of the following chief complaint(s):  Diabetes, Hypertension, and Hyperlipidemia      ASSESSMENT/PLAN:    1. Type 2 diabetes mellitus with peripheral neuropathy (HCC)  Assessment & Plan:  -Most recent hemoglobin A1c of 7.0% shows diabetes control is near goal  -Continue metformin 1000 mg once daily  -Continue Januvia 100 mg once daily  -Limit carbohydrates to 45 grams with meals and 15 grams with snacks  -monitor blood sugars  -goal for blood sugar fasting or pre-meal  is   -goal for blood sugar 2 hours after a meal is less than 180  -goal for blood sugar at bedtime is less than 150  -Regular aerobic exercise  Orders:  -     Hemoglobin A1C; Future  2. Essential hypertension  Assessment & Plan:  -Stable off medication  -Low sodium diet  -Regular aerobic exercise  3. Mixed hyperlipidemia  Assessment & Plan:  -Stable  -LDL is at goal  -Continue rosuvastatin 20 mg once daily  -Low fat, low cholesterol diet  -Regular aerobic exercise  4. Anemia, unspecified type  Assessment & Plan:  -Chronic and stable  -Continue ferrous sulfate 325 mg once daily   5. Alzheimer's disease with early onset (HCC)  Assessment & Plan:  -Progressing  -Start donepezil 5 mg nightly  -Alzheimer's support group for family  -Follow-up with Neurology   Orders:  -     [facility] - Marco Mai MD, Neurology, [facility]  -     donepezil (ARICEPT) 5 MG tablet; Take 1 tablet by mouth nightly, Disp-30 tablet, R-1Normal  6. Weight loss  Assessment & Plan:  -Not controlled  -Patient continues to lose weight  -Patient has no appetite  -TSH  -CBC with differential  -Referral to GI   Orders:  -     Non [facility] - External Referral To Gastroenterology  -     TSH; Future  -     CBC with Auto Differential; Future  7. Stage 3b chronic kidney disease (HCC)  Assessment & Plan:  -Stable  -Avoid NSAIDs  -Continue care per

## 2024-11-11 PROBLEM — Z23 NEED FOR INFLUENZA VACCINATION: Status: ACTIVE | Noted: 2024-11-11

## 2024-11-11 ASSESSMENT — ENCOUNTER SYMPTOMS
NAUSEA: 0
BLOOD IN STOOL: 0
SINUS PRESSURE: 0
CHEST TIGHTNESS: 0
SHORTNESS OF BREATH: 0
ABDOMINAL PAIN: 0
DIARRHEA: 0
SORE THROAT: 0
CONSTIPATION: 0
WHEEZING: 0
COUGH: 0
RHINORRHEA: 0
VOMITING: 0

## 2024-11-11 NOTE — ASSESSMENT & PLAN NOTE
-Not controlled  -Patient continues to lose weight  -Patient has no appetite  -TSH  -CBC with differential  -Referral to GI

## 2024-11-11 NOTE — ASSESSMENT & PLAN NOTE
-Stable  -LDL is at goal  -Continue rosuvastatin 20 mg once daily  -Low fat, low cholesterol diet  -Regular aerobic exercise

## 2024-11-11 NOTE — ASSESSMENT & PLAN NOTE
-Most recent hemoglobin A1c of 7.0% shows diabetes control is near goal  -Continue metformin 1000 mg once daily  -Continue Januvia 100 mg once daily  -Limit carbohydrates to 45 grams with meals and 15 grams with snacks  -monitor blood sugars  -goal for blood sugar fasting or pre-meal  is   -goal for blood sugar 2 hours after a meal is less than 180  -goal for blood sugar at bedtime is less than 150  -Regular aerobic exercise

## 2024-11-11 NOTE — ASSESSMENT & PLAN NOTE
-Progressing  -Start donepezil 5 mg nightly  -Alzheimer's support group for family  -Follow-up with Neurology

## 2024-11-18 ENCOUNTER — HOSPITAL ENCOUNTER (OUTPATIENT)
Dept: ULTRASOUND IMAGING | Age: 69
Discharge: HOME | End: 2024-11-18
Attending: HOSPITALIST
Payer: MEDICARE

## 2024-11-18 DIAGNOSIS — N18.32 STAGE 3B CHRONIC KIDNEY DISEASE (HCC): ICD-10-CM

## 2024-11-18 DIAGNOSIS — R63.4 WEIGHT LOSS: ICD-10-CM

## 2024-11-18 DIAGNOSIS — E11.42 TYPE 2 DIABETES MELLITUS WITH PERIPHERAL NEUROPATHY (HCC): ICD-10-CM

## 2024-11-18 PROCEDURE — 76770 US EXAM ABDO BACK WALL COMP: CPT

## 2024-11-19 ENCOUNTER — TELEPHONE (OUTPATIENT)
Dept: PRIMARY CARE CLINIC | Age: 69
End: 2024-11-19

## 2024-11-19 LAB
BASOPHILS # BLD: 0.1 K/UL (ref 0–0.2)
BASOPHILS NFR BLD: 0.9 %
DEPRECATED RDW RBC AUTO: 15.1 % (ref 12.4–15.4)
EOSINOPHIL # BLD: 0.1 K/UL (ref 0–0.6)
EOSINOPHIL NFR BLD: 1.1 %
EST. AVERAGE GLUCOSE BLD GHB EST-MCNC: 142.7 MG/DL
HBA1C MFR BLD: 6.6 %
HCT VFR BLD AUTO: 29.8 % (ref 36–48)
HGB BLD-MCNC: 9.9 G/DL (ref 12–16)
LYMPHOCYTES # BLD: 2.6 K/UL (ref 1–5.1)
LYMPHOCYTES NFR BLD: 37.7 %
MCH RBC QN AUTO: 27.7 PG (ref 26–34)
MCHC RBC AUTO-ENTMCNC: 33.1 G/DL (ref 31–36)
MCV RBC AUTO: 83.7 FL (ref 80–100)
MONOCYTES # BLD: 0.5 K/UL (ref 0–1.3)
MONOCYTES NFR BLD: 6.7 %
NEUTROPHILS # BLD: 3.6 K/UL (ref 1.7–7.7)
NEUTROPHILS NFR BLD: 53.6 %
PLATELET # BLD AUTO: 410 K/UL (ref 135–450)
PMV BLD AUTO: 7.2 FL (ref 5–10.5)
RBC # BLD AUTO: 3.56 M/UL (ref 4–5.2)
TSH SERPL DL<=0.005 MIU/L-ACNC: 2.48 UIU/ML (ref 0.27–4.2)
WBC # BLD AUTO: 6.8 K/UL (ref 4–11)

## 2024-11-19 NOTE — TELEPHONE ENCOUNTER
[facility] calling  All ' active ' medications requested faxed to: [phone]  *Requester stated a fax request had been sent previously  Fax# [phone]  Ph# [phone]

## 2024-11-19 NOTE — TELEPHONE ENCOUNTER
Spoke with pharmacy they just need an update list on the pts medication since she is new to their pharmacy. Printed off pt med list and faxed over to pharmacy

## 2024-11-21 NOTE — TELEPHONE ENCOUNTER
Please re-fax medication list  [facility] calling  All ' active ' medications requested faxed to: [phone]

## 2024-11-26 ENCOUNTER — TELEPHONE (OUTPATIENT)
Dept: PRIMARY CARE CLINIC | Age: 69
End: 2024-11-26

## 2024-11-26 DIAGNOSIS — E78.2 MIXED HYPERLIPIDEMIA: ICD-10-CM

## 2024-11-26 DIAGNOSIS — E11.42 TYPE 2 DIABETES MELLITUS WITH PERIPHERAL NEUROPATHY (HCC): ICD-10-CM

## 2024-11-26 RX ORDER — ROSUVASTATIN CALCIUM 20 MG/1
20 TABLET, COATED ORAL DAILY
Qty: 90 TABLET | Refills: 0 | Status: SHIPPED | OUTPATIENT
Start: 2024-11-26

## 2024-11-26 NOTE — TELEPHONE ENCOUNTER
Medication:   Requested Prescriptions     Pending Prescriptions Disp Refills    metFORMIN (GLUCOPHAGE) 1000 MG tablet 90 tablet 0     Sig: TAKE 1 TABLET BY MOUTH IN THE MORNING    SITagliptin (JANUVIA) 100 MG tablet 90 tablet 1     Sig: Take 1 tablet by mouth daily    rosuvastatin (CRESTOR) 20 MG tablet 90 tablet 0     Sig: Take 1 tablet by mouth daily     Last Filled:  9/3/24 8/2/24 5/1/24    Last appt: 11/1/2024   Next appt: 12/31/2024    Last OARRS:        No data to display

## 2024-11-26 NOTE — TELEPHONE ENCOUNTER
Sigrid from [facility] [address] [place],[id number] is calling stating a fax was sent for 3 medications     metFORMIN (GLUCOPHAGE) 1000 MG tablet [[id number]]  metFORMIN (GLUCOPHAGE) 1000 MG tablet [[id number]]    SITagliptin (JANUVIA) 50 MG tablet [[id number]]  DISCONTINUED    rosuvastatin (CRESTOR) 20 MG tablet [[id number]]     the pt needs refilled please fax scripts to [phone]

## 2024-12-01 ENCOUNTER — PATIENT MESSAGE (OUTPATIENT)
Dept: PRIMARY CARE CLINIC | Age: 69
End: 2024-12-01

## 2024-12-01 DIAGNOSIS — D64.9 ANEMIA, UNSPECIFIED TYPE: ICD-10-CM

## 2024-12-01 DIAGNOSIS — E78.2 MIXED HYPERLIPIDEMIA: ICD-10-CM

## 2024-12-01 DIAGNOSIS — E11.42 TYPE 2 DIABETES MELLITUS WITH PERIPHERAL NEUROPATHY (HCC): ICD-10-CM

## 2024-12-02 RX ORDER — FERROUS SULFATE 325(65) MG
1 TABLET ORAL
Qty: 90 TABLET | Refills: 0 | Status: SHIPPED | OUTPATIENT
Start: 2024-12-02

## 2024-12-02 RX ORDER — ROSUVASTATIN CALCIUM 20 MG/1
20 TABLET, COATED ORAL DAILY
Qty: 90 TABLET | Refills: 0 | Status: SHIPPED | OUTPATIENT
Start: 2024-12-02

## 2024-12-11 PROBLEM — Z23 NEED FOR INFLUENZA VACCINATION: Status: RESOLVED | Noted: 2024-11-11 | Resolved: 2024-12-11

## 2024-12-31 ENCOUNTER — OFFICE VISIT (OUTPATIENT)
Dept: PRIMARY CARE CLINIC | Age: 69
End: 2024-12-31
Payer: MEDICARE

## 2024-12-31 VITALS
OXYGEN SATURATION: 98 % | DIASTOLIC BLOOD PRESSURE: 72 MMHG | RESPIRATION RATE: 16 BRPM | WEIGHT: 120 LBS | BODY MASS INDEX: 22.67 KG/M2 | SYSTOLIC BLOOD PRESSURE: 124 MMHG | TEMPERATURE: 97.8 F | HEART RATE: 76 BPM

## 2024-12-31 DIAGNOSIS — R63.4 WEIGHT LOSS: Primary | ICD-10-CM

## 2024-12-31 DIAGNOSIS — G30.0 ALZHEIMER'S DISEASE WITH EARLY ONSET (HCC): ICD-10-CM

## 2024-12-31 DIAGNOSIS — F02.80 ALZHEIMER'S DISEASE WITH EARLY ONSET (HCC): ICD-10-CM

## 2024-12-31 PROCEDURE — 3074F SYST BP LT 130 MM HG: CPT | Performed by: INTERNAL MEDICINE

## 2024-12-31 PROCEDURE — G8420 CALC BMI NORM PARAMETERS: HCPCS | Performed by: INTERNAL MEDICINE

## 2024-12-31 PROCEDURE — 1036F TOBACCO NON-USER: CPT | Performed by: INTERNAL MEDICINE

## 2024-12-31 PROCEDURE — G8399 PT W/DXA RESULTS DOCUMENT: HCPCS | Performed by: INTERNAL MEDICINE

## 2024-12-31 PROCEDURE — G8482 FLU IMMUNIZE ORDER/ADMIN: HCPCS | Performed by: INTERNAL MEDICINE

## 2024-12-31 PROCEDURE — 99213 OFFICE O/P EST LOW 20 MIN: CPT | Performed by: INTERNAL MEDICINE

## 2024-12-31 PROCEDURE — 1090F PRES/ABSN URINE INCON ASSESS: CPT | Performed by: INTERNAL MEDICINE

## 2024-12-31 PROCEDURE — G8427 DOCREV CUR MEDS BY ELIG CLIN: HCPCS | Performed by: INTERNAL MEDICINE

## 2024-12-31 PROCEDURE — 3078F DIAST BP <80 MM HG: CPT | Performed by: INTERNAL MEDICINE

## 2024-12-31 PROCEDURE — 3017F COLORECTAL CA SCREEN DOC REV: CPT | Performed by: INTERNAL MEDICINE

## 2024-12-31 PROCEDURE — 1123F ACP DISCUSS/DSCN MKR DOCD: CPT | Performed by: INTERNAL MEDICINE

## 2024-12-31 RX ORDER — DONEPEZIL HYDROCHLORIDE 10 MG/1
10 TABLET, FILM COATED ORAL NIGHTLY
Qty: 90 TABLET | Refills: 1 | Status: SHIPPED | OUTPATIENT
Start: 2024-12-31

## 2024-12-31 RX ORDER — DONEPEZIL HYDROCHLORIDE 5 MG/1
5 TABLET, FILM COATED ORAL NIGHTLY
Qty: 30 TABLET | Refills: 1 | Status: CANCELLED | OUTPATIENT
Start: 2024-12-31

## 2024-12-31 NOTE — PROGRESS NOTES
Date of Visit: 2024    Viktoriya Osorio (:  1955) is a 69 y.o. female,  Established patient here for evaluation of the following chief complaint(s):  Weight Loss and alzheimers      ASSESSMENT/PLAN:    1. Weight loss  Assessment & Plan:  -Weight gain of 7 pounds since last visit  -Labs unremarkable for weight loss  -Patient has seen GI and is supposed to have a CT scan  -Schedule CT ordered by GI  -Continue to work on increasing meals  -Continue Boost nutritional shakes  2. Alzheimer's disease with early onset (HCC)  Assessment & Plan:  -Progressing  -Increase donepezil to 10 mg nightly  -Alzheimer's support group for family  -Follow-up with Neurology   Orders:  -     donepezil (ARICEPT) 10 MG tablet; Take 1 tablet by mouth nightly, Disp-90 tablet, R-1Normal      Return in about 7 weeks (around 2025) for Medicare AW.    SUBJECTIVE:    Patient has has had weight loss but gained some weight since last visit. Patient has gained 7 pounds. Patient's  states patient has been eating instead of eating healthy. He states patient has been eating what ever she wants. Patient's  states patient has been eating 3 pounds of M&Ms per week. Patient drinks 1/2 boost shakes once daily and eats very little of regular meals. Patient is eating more. Patient saw GI after last visit and is supposed to have a CT scan but has not scheduled yet. Patient has Alzheimer's dementia. Patient's memory has declined. Patient's  is not sure if she has had any difference in memory with donepezil. Patient's appetite has increased.      Review of Systems   Constitutional:  Positive for appetite change and unexpected weight change. Negative for activity change and fatigue.   Respiratory:  Negative for cough and shortness of breath.    Cardiovascular:  Negative for chest pain.   Gastrointestinal:  Negative for abdominal pain, diarrhea, nausea and vomiting.   Psychiatric/Behavioral:  Positive for confusion.

## 2025-01-17 ASSESSMENT — ENCOUNTER SYMPTOMS
COUGH: 0
VOMITING: 0
SHORTNESS OF BREATH: 0
DIARRHEA: 0
ABDOMINAL PAIN: 0
NAUSEA: 0

## 2025-01-17 NOTE — ASSESSMENT & PLAN NOTE
-Progressing  -Increase donepezil to 10 mg nightly  -Alzheimer's support group for family  -Follow-up with Neurology

## 2025-01-17 NOTE — ASSESSMENT & PLAN NOTE
-Weight gain of 7 pounds since last visit  -Labs unremarkable for weight loss  -Patient has seen GI and is supposed to have a CT scan  -Schedule CT ordered by GI  -Continue to work on increasing meals  -Continue Boost nutritional shakes   WDL

## 2025-02-18 ENCOUNTER — OFFICE VISIT (OUTPATIENT)
Dept: PRIMARY CARE CLINIC | Age: 70
End: 2025-02-18
Payer: MEDICARE

## 2025-02-18 VITALS
WEIGHT: 125 LBS | RESPIRATION RATE: 16 BRPM | SYSTOLIC BLOOD PRESSURE: 128 MMHG | DIASTOLIC BLOOD PRESSURE: 86 MMHG | BODY MASS INDEX: 23.6 KG/M2 | OXYGEN SATURATION: 99 % | HEART RATE: 72 BPM | TEMPERATURE: 97.1 F | HEIGHT: 61 IN

## 2025-02-18 DIAGNOSIS — D64.9 ANEMIA, UNSPECIFIED TYPE: ICD-10-CM

## 2025-02-18 DIAGNOSIS — I10 ESSENTIAL HYPERTENSION: ICD-10-CM

## 2025-02-18 DIAGNOSIS — G30.0 ALZHEIMER'S DISEASE WITH EARLY ONSET (HCC): ICD-10-CM

## 2025-02-18 DIAGNOSIS — E11.42 TYPE 2 DIABETES MELLITUS WITH PERIPHERAL NEUROPATHY (HCC): ICD-10-CM

## 2025-02-18 DIAGNOSIS — E78.2 MIXED HYPERLIPIDEMIA: ICD-10-CM

## 2025-02-18 DIAGNOSIS — R63.4 WEIGHT LOSS: ICD-10-CM

## 2025-02-18 DIAGNOSIS — F02.80 ALZHEIMER'S DISEASE WITH EARLY ONSET (HCC): ICD-10-CM

## 2025-02-18 DIAGNOSIS — Z00.00 MEDICARE ANNUAL WELLNESS VISIT, SUBSEQUENT: Primary | ICD-10-CM

## 2025-02-18 LAB
ALBUMIN SERPL-MCNC: 4.6 G/DL (ref 3.4–5)
ALBUMIN/GLOB SERPL: 1.5 {RATIO} (ref 1.1–2.2)
ALP SERPL-CCNC: 56 U/L (ref 40–129)
ALT SERPL-CCNC: 21 U/L (ref 10–40)
ANION GAP SERPL CALCULATED.3IONS-SCNC: 12 MMOL/L (ref 3–16)
AST SERPL-CCNC: 36 U/L (ref 15–37)
BILIRUB SERPL-MCNC: <0.2 MG/DL (ref 0–1)
BUN SERPL-MCNC: 15 MG/DL (ref 7–20)
CALCIUM SERPL-MCNC: 10.3 MG/DL (ref 8.3–10.6)
CHLORIDE SERPL-SCNC: 103 MMOL/L (ref 99–110)
CHOLEST SERPL-MCNC: 206 MG/DL (ref 0–199)
CO2 SERPL-SCNC: 26 MMOL/L (ref 21–32)
CREAT SERPL-MCNC: 1.6 MG/DL (ref 0.6–1.2)
DEPRECATED RDW RBC AUTO: 13.3 % (ref 12.4–15.4)
GFR SERPLBLD CREATININE-BSD FMLA CKD-EPI: 35 ML/MIN/{1.73_M2}
GLUCOSE SERPL-MCNC: 109 MG/DL (ref 70–99)
HCT VFR BLD AUTO: 31.8 % (ref 36–48)
HDLC SERPL-MCNC: 94 MG/DL (ref 40–60)
HGB BLD-MCNC: 10.4 G/DL (ref 12–16)
IRON SATN MFR SERPL: 13 % (ref 15–50)
IRON SERPL-MCNC: 49 UG/DL (ref 37–145)
LDLC SERPL CALC-MCNC: 99 MG/DL
MCH RBC QN AUTO: 27.2 PG (ref 26–34)
MCHC RBC AUTO-ENTMCNC: 32.7 G/DL (ref 31–36)
MCV RBC AUTO: 83.1 FL (ref 80–100)
PLATELET # BLD AUTO: 300 K/UL (ref 135–450)
PMV BLD AUTO: 7.2 FL (ref 5–10.5)
POTASSIUM SERPL-SCNC: 4.2 MMOL/L (ref 3.5–5.1)
PROT SERPL-MCNC: 7.7 G/DL (ref 6.4–8.2)
RBC # BLD AUTO: 3.82 M/UL (ref 4–5.2)
SODIUM SERPL-SCNC: 141 MMOL/L (ref 136–145)
TIBC SERPL-MCNC: 380 UG/DL (ref 260–445)
TRIGL SERPL-MCNC: 66 MG/DL (ref 0–150)
VLDLC SERPL CALC-MCNC: 13 MG/DL
WBC # BLD AUTO: 5.8 K/UL (ref 4–11)

## 2025-02-18 PROCEDURE — 3079F DIAST BP 80-89 MM HG: CPT | Performed by: INTERNAL MEDICINE

## 2025-02-18 PROCEDURE — 3074F SYST BP LT 130 MM HG: CPT | Performed by: INTERNAL MEDICINE

## 2025-02-18 PROCEDURE — 1123F ACP DISCUSS/DSCN MKR DOCD: CPT | Performed by: INTERNAL MEDICINE

## 2025-02-18 PROCEDURE — G0439 PPPS, SUBSEQ VISIT: HCPCS | Performed by: INTERNAL MEDICINE

## 2025-02-18 SDOH — ECONOMIC STABILITY: FOOD INSECURITY: WITHIN THE PAST 12 MONTHS, YOU WORRIED THAT YOUR FOOD WOULD RUN OUT BEFORE YOU GOT MONEY TO BUY MORE.: NEVER TRUE

## 2025-02-18 SDOH — ECONOMIC STABILITY: FOOD INSECURITY: WITHIN THE PAST 12 MONTHS, THE FOOD YOU BOUGHT JUST DIDN'T LAST AND YOU DIDN'T HAVE MONEY TO GET MORE.: NEVER TRUE

## 2025-02-18 SDOH — ECONOMIC STABILITY: INCOME INSECURITY: IN THE LAST 12 MONTHS, WAS THERE A TIME WHEN YOU WERE NOT ABLE TO PAY THE MORTGAGE OR RENT ON TIME?: NO

## 2025-02-18 SDOH — HEALTH STABILITY: PHYSICAL HEALTH: ON AVERAGE, HOW MANY MINUTES DO YOU ENGAGE IN EXERCISE AT THIS LEVEL?: 30 MIN

## 2025-02-18 SDOH — HEALTH STABILITY: PHYSICAL HEALTH: ON AVERAGE, HOW MANY DAYS PER WEEK DO YOU ENGAGE IN MODERATE TO STRENUOUS EXERCISE (LIKE A BRISK WALK)?: 2 DAYS

## 2025-02-18 ASSESSMENT — PATIENT HEALTH QUESTIONNAIRE - PHQ9
2. FEELING DOWN, DEPRESSED OR HOPELESS: NOT AT ALL
1. LITTLE INTEREST OR PLEASURE IN DOING THINGS: NOT AT ALL
SUM OF ALL RESPONSES TO PHQ QUESTIONS 1-9: 0
SUM OF ALL RESPONSES TO PHQ9 QUESTIONS 1 & 2: 0
SUM OF ALL RESPONSES TO PHQ QUESTIONS 1-9: 0

## 2025-02-18 ASSESSMENT — LIFESTYLE VARIABLES
HOW OFTEN DO YOU HAVE SIX OR MORE DRINKS ON ONE OCCASION: 1
HOW OFTEN DO YOU HAVE A DRINK CONTAINING ALCOHOL: MONTHLY OR LESS
HOW MANY STANDARD DRINKS CONTAINING ALCOHOL DO YOU HAVE ON A TYPICAL DAY: 1
HOW OFTEN DO YOU HAVE A DRINK CONTAINING ALCOHOL: 2
HOW MANY STANDARD DRINKS CONTAINING ALCOHOL DO YOU HAVE ON A TYPICAL DAY: 1 OR 2

## 2025-02-18 NOTE — PROGRESS NOTES
Medicare Annual Wellness Visit    Viktoriya Osorio is here for Medicare AWV    Assessment & Plan   1. Medicare annual wellness visit, subsequent  Assessment & Plan:  -Medicare AWV done  2. Type 2 diabetes mellitus with peripheral neuropathy (HCC)  Assessment & Plan:  -Most recent hemoglobin A1c of 6.6% shows diabetes is controlled  -Continue metformin 500 mg once daily  -Continue Januvia 100 mg once daily  -Limit carbohydrates to 45 grams with meals and 15 grams with snacks  -monitor blood sugars  -goal for blood sugar fasting or pre-meal  is   -goal for blood sugar 2 hours after a meal is less than 180  -goal for blood sugar at bedtime is less than 150  -Regular aerobic exercise  Orders:  -     Comprehensive Metabolic Panel; Future  3. Essential hypertension  Assessment & Plan:  -Stable off medication  -Low sodium diet  -Regular aerobic exercise  Orders:  -     Comprehensive Metabolic Panel; Future  4. Mixed hyperlipidemia  Assessment & Plan:  -Stable  -LDL is at goal  -Continue rosuvastatin 20 mg once daily  -Low fat, low cholesterol diet  -Regular aerobic exercise  Orders:  -     Lipid Panel; Future  -     Comprehensive Metabolic Panel; Future  5. Alzheimer's disease with early onset (HCC)  Assessment & Plan:  -Progressing  -Continue donepezil to 10 mg nightly  -Alzheimer's support group for family  -Continue care with Neurology   6. Weight loss  Assessment & Plan:  -improved  -patient has gained weight back  -BMI normal at 23.62  -Labs unremarkable for weight loss  -Continue to work on increasing meals  -Boost nutritional shakes  7. Anemia, unspecified type  Assessment & Plan:  -Chronic and stable  -Continue ferrous sulfate 325 mg once daily   Orders:  -     CBC; Future  -     Iron and TIBC; Future         Return in about 3 months (around 5/18/2025) for diabetes, hypertension,and  hyperlipidemia.     Subjective   The following acute and/or chronic problems were also addressed today:    Patient's

## 2025-02-24 PROBLEM — Z00.00 MEDICARE ANNUAL WELLNESS VISIT, SUBSEQUENT: Status: ACTIVE | Noted: 2025-02-24

## 2025-02-24 NOTE — ASSESSMENT & PLAN NOTE
-Progressing  -Continue donepezil to 10 mg nightly  -Alzheimer's support group for family  -Continue care with Neurology

## 2025-02-24 NOTE — ASSESSMENT & PLAN NOTE
-Most recent hemoglobin A1c of 6.6% shows diabetes is controlled  -Continue metformin 500 mg once daily  -Continue Januvia 100 mg once daily  -Limit carbohydrates to 45 grams with meals and 15 grams with snacks  -monitor blood sugars  -goal for blood sugar fasting or pre-meal  is   -goal for blood sugar 2 hours after a meal is less than 180  -goal for blood sugar at bedtime is less than 150  -Regular aerobic exercise

## 2025-02-24 NOTE — ASSESSMENT & PLAN NOTE
-improved  -patient has weight gain  -BMI normal at 23.62  -Labs unremarkable for weight loss  -Continue to work on increasing meals  -Boost nutritional shakes

## 2025-02-28 NOTE — TELEPHONE ENCOUNTER
Called pt and scheduled labs for 2/28/25.   Medication:   Requested Prescriptions     Pending Prescriptions Disp Refills    JANUVIA 100 MG tablet [Pharmacy Med Name: Januvia 100 MG Oral Tablet] 90 tablet 0     Sig: Take 1 tablet by mouth once daily     Last Filled:  08/16/21    Last appt: 12/6/2021   Next appt: Visit date not found    Last OARRS: No flowsheet data found.

## 2025-03-25 DIAGNOSIS — E78.2 MIXED HYPERLIPIDEMIA: ICD-10-CM

## 2025-03-25 DIAGNOSIS — E11.42 TYPE 2 DIABETES MELLITUS WITH PERIPHERAL NEUROPATHY (HCC): ICD-10-CM

## 2025-03-25 DIAGNOSIS — D64.9 ANEMIA, UNSPECIFIED TYPE: ICD-10-CM

## 2025-03-25 RX ORDER — ROSUVASTATIN CALCIUM 20 MG/1
20 TABLET, COATED ORAL DAILY
Qty: 90 TABLET | Refills: 1 | Status: SHIPPED | OUTPATIENT
Start: 2025-03-25

## 2025-03-25 RX ORDER — FERROUS SULFATE 325(65) MG
1 TABLET ORAL
Qty: 90 TABLET | Refills: 1 | Status: SHIPPED | OUTPATIENT
Start: 2025-03-25

## 2025-03-25 RX ORDER — SITAGLIPTIN 100 MG/1
100 TABLET, FILM COATED ORAL DAILY
Qty: 90 TABLET | Refills: 1 | Status: SHIPPED | OUTPATIENT
Start: 2025-03-25

## 2025-03-25 NOTE — TELEPHONE ENCOUNTER
Medication:   Requested Prescriptions     Pending Prescriptions Disp Refills    rosuvastatin (CRESTOR) 20 MG tablet [Pharmacy Med Name: Rosuvastatin Calcium 20 MG Oral Tablet] 90 tablet 0     Sig: Take 1 tablet by mouth once daily    SV IRON 325 (65 Fe) MG tablet [Pharmacy Med Name: SV Iron 325 MG Oral Tablet] 90 tablet 0     Sig: Take 1 tablet by mouth once daily with breakfast    JANUVIA 100 MG tablet [Pharmacy Med Name: Januvia 100 MG Oral Tablet] 90 tablet 0     Sig: Take 1 tablet by mouth once daily     Last Filled:  12/2/24    Last appt: 2/18/2025   Next appt: 5/19/2025    Last Lipid:   Lab Results   Component Value Date/Time    CHOL 206 02/18/2025 11:51 AM    TRIG 66 02/18/2025 11:51 AM    HDL 94 02/18/2025 11:51 AM    HDL 63 02/14/2012 10:10 AM

## 2025-03-26 PROBLEM — Z00.00 MEDICARE ANNUAL WELLNESS VISIT, SUBSEQUENT: Status: RESOLVED | Noted: 2025-02-24 | Resolved: 2025-03-26

## 2025-04-08 ENCOUNTER — TELEPHONE (OUTPATIENT)
Dept: PRIMARY CARE CLINIC | Age: 70
End: 2025-04-08

## 2025-04-21 ENCOUNTER — TELEPHONE (OUTPATIENT)
Dept: PRIMARY CARE CLINIC | Age: 70
End: 2025-04-21

## 2025-04-21 NOTE — TELEPHONE ENCOUNTER
Delfino with [employer] looking for a paper signed by Dr Anderson.    Test req. Form to be signed by Dr. Anderson and faxed back to them     [phone].

## 2025-04-24 ENCOUNTER — TELEPHONE (OUTPATIENT)
Dept: PRIMARY CARE CLINIC | Age: 70
End: 2025-04-24

## 2025-04-24 NOTE — TELEPHONE ENCOUNTER
Delfino with [employer] [facility] called again looking for forms to be signed by Dr. Anderson.     Form was received and is in Dr. Anderson's box for signature

## 2025-05-19 ENCOUNTER — OFFICE VISIT (OUTPATIENT)
Dept: PRIMARY CARE CLINIC | Age: 70
End: 2025-05-19
Payer: COMMERCIAL

## 2025-05-19 VITALS
SYSTOLIC BLOOD PRESSURE: 130 MMHG | OXYGEN SATURATION: 99 % | HEART RATE: 60 BPM | DIASTOLIC BLOOD PRESSURE: 80 MMHG | TEMPERATURE: 97.7 F | BODY MASS INDEX: 24.37 KG/M2 | RESPIRATION RATE: 13 BRPM | WEIGHT: 129 LBS

## 2025-05-19 DIAGNOSIS — I10 ESSENTIAL HYPERTENSION: ICD-10-CM

## 2025-05-19 DIAGNOSIS — E78.2 MIXED HYPERLIPIDEMIA: ICD-10-CM

## 2025-05-19 DIAGNOSIS — D64.9 ANEMIA, UNSPECIFIED TYPE: ICD-10-CM

## 2025-05-19 DIAGNOSIS — E11.42 TYPE 2 DIABETES MELLITUS WITH PERIPHERAL NEUROPATHY (HCC): Primary | ICD-10-CM

## 2025-05-19 LAB — HBA1C MFR BLD: 7.6 %

## 2025-05-19 PROCEDURE — 3079F DIAST BP 80-89 MM HG: CPT | Performed by: INTERNAL MEDICINE

## 2025-05-19 PROCEDURE — 3075F SYST BP GE 130 - 139MM HG: CPT | Performed by: INTERNAL MEDICINE

## 2025-05-19 PROCEDURE — 3051F HG A1C>EQUAL 7.0%<8.0%: CPT | Performed by: INTERNAL MEDICINE

## 2025-05-19 PROCEDURE — 83036 HEMOGLOBIN GLYCOSYLATED A1C: CPT | Performed by: INTERNAL MEDICINE

## 2025-05-19 PROCEDURE — 1123F ACP DISCUSS/DSCN MKR DOCD: CPT | Performed by: INTERNAL MEDICINE

## 2025-05-19 PROCEDURE — 99214 OFFICE O/P EST MOD 30 MIN: CPT | Performed by: INTERNAL MEDICINE

## 2025-05-19 NOTE — PROGRESS NOTES
Date of Visit: 2025    Viktoriya Osorio (:  1955) is a 69 y.o. female,  Established patient here for evaluation of the following chief complaint(s):  Diabetes, Hypertension, and Hyperlipidemia      ASSESSMENT/PLAN:    Assessment & Plan  1. Type 2 diabetes mellitus with peripheral neuropathy  - Hemoglobin A1c level elevated at 7.6%, indicating need for improved glycemic control  - Increase metformin dosage to 500 mg to two tablets once daily with breakfast  - Limit carbohydrate intake to 45 g per meal and 15 g per snack  - Monitor blood glucose levels regularly  - Fasting blood glucose:   - Postprandial blood glucose: <180  - Bedtime blood glucose: <150  - Provide blood glucose readings for review  - regular aerobic exercise  - comprehensive metabolic panel    2. Essential hypertension  - Hypertension currently stable without medication  - Maintain low-sodium diet  - regular aerobic exercise  - comprehensive metabolic panel    3. Mixed hyperlipidemia  - Lipid profile stable, LDL levels within target range  - Continue rosuvastatin 20 mg once daily  - low-fat, low-cholesterol diet  - regular aerobic exercise  - Lipid panel  - compreh    4. Anemia  - Chronic and stable   - Continue ferrous sulfate 325 mg once daily  - Continue care per Hematology        Return in about 3 months (around 2025) for diabetes.          SUBJECTIVE:    History of Present Illness  The patient presents for a follow-up of type 2 diabetes, hypertension, and hyperlipidemia. She is accompanied by her  who assists with visit. She has Alzheimer's dementia.     She reports a decreased appetite and reduced food intake, with a particular aversion to shakes. Despite this, she maintains a high consumption of sweets, including chocolate chip cookies and Oreos. She also ensures adequate hydration through increased water intake. Her physical activity includes daily walking. She monitors her blood glucose levels regularly,

## 2025-05-28 ENCOUNTER — RESULTS FOLLOW-UP (OUTPATIENT)
Dept: PRIMARY CARE CLINIC | Age: 70
End: 2025-05-28

## 2025-05-28 ASSESSMENT — ENCOUNTER SYMPTOMS
SORE THROAT: 0
ABDOMINAL PAIN: 0
RHINORRHEA: 0
COUGH: 0
CONSTIPATION: 0
SHORTNESS OF BREATH: 0
BLOOD IN STOOL: 0
NAUSEA: 0
VOMITING: 0
DIARRHEA: 0
SINUS PRESSURE: 0
CHEST TIGHTNESS: 0
WHEEZING: 0

## 2025-07-16 ENCOUNTER — PATIENT MESSAGE (OUTPATIENT)
Dept: PRIMARY CARE CLINIC | Age: 70
End: 2025-07-16

## 2025-07-18 ENCOUNTER — APPOINTMENT (OUTPATIENT)
Dept: GENERAL RADIOLOGY | Age: 70
End: 2025-07-18
Payer: COMMERCIAL

## 2025-07-18 ENCOUNTER — HOSPITAL ENCOUNTER (INPATIENT)
Age: 70
LOS: 8 days | Discharge: NURSING, REHAB OR LONG-TERM CARE | End: 2025-07-26
Attending: STUDENT IN AN ORGANIZED HEALTH CARE EDUCATION/TRAINING PROGRAM | Admitting: INTERNAL MEDICINE
Payer: COMMERCIAL

## 2025-07-18 DIAGNOSIS — R63.8 DECREASED ORAL INTAKE: ICD-10-CM

## 2025-07-18 DIAGNOSIS — N30.00 ACUTE CYSTITIS WITHOUT HEMATURIA: Primary | ICD-10-CM

## 2025-07-18 PROBLEM — N39.0 UTI (URINARY TRACT INFECTION): Status: ACTIVE | Noted: 2025-07-18

## 2025-07-18 LAB
ALBUMIN SERPL-MCNC: 4.9 G/DL (ref 3.4–5)
ALP SERPL-CCNC: 63 U/L (ref 40–129)
ALT SERPL-CCNC: 29 U/L (ref 10–40)
ANION GAP SERPL CALCULATED.3IONS-SCNC: 19 MMOL/L (ref 3–16)
AST SERPL-CCNC: 60 U/L (ref 15–37)
BACTERIA URNS QL MICRO: ABNORMAL /HPF
BASOPHILS # BLD: 0 K/UL (ref 0–0.2)
BASOPHILS NFR BLD: 0.4 %
BILIRUB DIRECT SERPL-MCNC: 0.2 MG/DL (ref 0–0.3)
BILIRUB INDIRECT SERPL-MCNC: 0.5 MG/DL (ref 0–1)
BILIRUB SERPL-MCNC: 0.7 MG/DL (ref 0–1)
BILIRUB UR QL STRIP.AUTO: NEGATIVE
BUN SERPL-MCNC: 54 MG/DL (ref 7–20)
CALCIUM SERPL-MCNC: 11 MG/DL (ref 8.3–10.6)
CHLORIDE SERPL-SCNC: 111 MMOL/L (ref 99–110)
CLARITY UR: CLEAR
CO2 SERPL-SCNC: 18 MMOL/L (ref 21–32)
COLOR UR: YELLOW
CREAT SERPL-MCNC: 1.8 MG/DL (ref 0.6–1.2)
DEPRECATED RDW RBC AUTO: 14.7 % (ref 12.4–15.4)
EOSINOPHIL # BLD: 0 K/UL (ref 0–0.6)
EOSINOPHIL NFR BLD: 0.3 %
EPI CELLS #/AREA URNS HPF: ABNORMAL /HPF (ref 0–5)
GFR SERPLBLD CREATININE-BSD FMLA CKD-EPI: 30 ML/MIN/{1.73_M2}
GLUCOSE BLD-MCNC: 141 MG/DL (ref 70–99)
GLUCOSE SERPL-MCNC: 153 MG/DL (ref 70–99)
GLUCOSE UR STRIP.AUTO-MCNC: NEGATIVE MG/DL
HCT VFR BLD AUTO: 34.8 % (ref 36–48)
HGB BLD-MCNC: 11.3 G/DL (ref 12–16)
HGB UR QL STRIP.AUTO: ABNORMAL
KETONES UR STRIP.AUTO-MCNC: 15 MG/DL
LACTATE BLDV-SCNC: 4.1 MMOL/L (ref 0.4–2)
LEUKOCYTE ESTERASE UR QL STRIP.AUTO: ABNORMAL
LIPASE SERPL-CCNC: 107 U/L (ref 13–60)
LYMPHOCYTES # BLD: 1.4 K/UL (ref 1–5.1)
LYMPHOCYTES NFR BLD: 11.4 %
MCH RBC QN AUTO: 26.5 PG (ref 26–34)
MCHC RBC AUTO-ENTMCNC: 32.5 G/DL (ref 31–36)
MCV RBC AUTO: 81.5 FL (ref 80–100)
MONOCYTES # BLD: 0.9 K/UL (ref 0–1.3)
MONOCYTES NFR BLD: 7.7 %
NEUTROPHILS # BLD: 9.8 K/UL (ref 1.7–7.7)
NEUTROPHILS NFR BLD: 80.2 %
NITRITE UR QL STRIP.AUTO: NEGATIVE
PERFORMED ON: ABNORMAL
PH UR STRIP.AUTO: 6 [PH] (ref 5–8)
PLATELET # BLD AUTO: 342 K/UL (ref 135–450)
PMV BLD AUTO: 7.5 FL (ref 5–10.5)
POTASSIUM SERPL-SCNC: 4.4 MMOL/L (ref 3.5–5.1)
PROT SERPL-MCNC: 9.2 G/DL (ref 6.4–8.2)
PROT UR STRIP.AUTO-MCNC: 100 MG/DL
RBC # BLD AUTO: 4.27 M/UL (ref 4–5.2)
RBC #/AREA URNS HPF: ABNORMAL /HPF (ref 0–4)
RENAL EPI CELLS #/AREA UR COMP ASSIST: ABNORMAL /HPF (ref 0–1)
SODIUM SERPL-SCNC: 148 MMOL/L (ref 136–145)
SP GR UR STRIP.AUTO: >=1.03 (ref 1–1.03)
UA COMPLETE W REFLEX CULTURE PNL UR: YES
UA DIPSTICK W REFLEX MICRO PNL UR: YES
URN SPEC COLLECT METH UR: ABNORMAL
UROBILINOGEN UR STRIP-ACNC: 0.2 E.U./DL
WBC # BLD AUTO: 12.2 K/UL (ref 4–11)
WBC #/AREA URNS HPF: ABNORMAL /HPF (ref 0–5)

## 2025-07-18 PROCEDURE — 1200000000 HC SEMI PRIVATE

## 2025-07-18 PROCEDURE — 87086 URINE CULTURE/COLONY COUNT: CPT

## 2025-07-18 PROCEDURE — 80076 HEPATIC FUNCTION PANEL: CPT

## 2025-07-18 PROCEDURE — 71045 X-RAY EXAM CHEST 1 VIEW: CPT

## 2025-07-18 PROCEDURE — 85025 COMPLETE CBC W/AUTO DIFF WBC: CPT

## 2025-07-18 PROCEDURE — 2500000003 HC RX 250 WO HCPCS

## 2025-07-18 PROCEDURE — 6360000002 HC RX W HCPCS

## 2025-07-18 PROCEDURE — 99285 EMERGENCY DEPT VISIT HI MDM: CPT

## 2025-07-18 PROCEDURE — 80048 BASIC METABOLIC PNL TOTAL CA: CPT

## 2025-07-18 PROCEDURE — 2580000003 HC RX 258

## 2025-07-18 PROCEDURE — 81001 URINALYSIS AUTO W/SCOPE: CPT

## 2025-07-18 PROCEDURE — 83605 ASSAY OF LACTIC ACID: CPT

## 2025-07-18 PROCEDURE — 83690 ASSAY OF LIPASE: CPT

## 2025-07-18 PROCEDURE — 36415 COLL VENOUS BLD VENIPUNCTURE: CPT

## 2025-07-18 RX ORDER — INSULIN LISPRO 100 [IU]/ML
0-8 INJECTION, SOLUTION INTRAVENOUS; SUBCUTANEOUS
Status: DISCONTINUED | OUTPATIENT
Start: 2025-07-19 | End: 2025-07-26 | Stop reason: HOSPADM

## 2025-07-18 RX ORDER — SODIUM CHLORIDE 0.9 % (FLUSH) 0.9 %
5-40 SYRINGE (ML) INJECTION EVERY 12 HOURS SCHEDULED
Status: DISCONTINUED | OUTPATIENT
Start: 2025-07-19 | End: 2025-07-26 | Stop reason: HOSPADM

## 2025-07-18 RX ORDER — SODIUM CHLORIDE, SODIUM LACTATE, POTASSIUM CHLORIDE, AND CALCIUM CHLORIDE .6; .31; .03; .02 G/100ML; G/100ML; G/100ML; G/100ML
1000 INJECTION, SOLUTION INTRAVENOUS ONCE
Status: COMPLETED | OUTPATIENT
Start: 2025-07-18 | End: 2025-07-18

## 2025-07-18 RX ORDER — GLUCAGON 1 MG/ML
1 KIT INJECTION PRN
Status: DISCONTINUED | OUTPATIENT
Start: 2025-07-18 | End: 2025-07-26 | Stop reason: HOSPADM

## 2025-07-18 RX ORDER — SODIUM CHLORIDE 9 MG/ML
INJECTION, SOLUTION INTRAVENOUS PRN
Status: DISCONTINUED | OUTPATIENT
Start: 2025-07-18 | End: 2025-07-26 | Stop reason: HOSPADM

## 2025-07-18 RX ORDER — POTASSIUM CHLORIDE 1500 MG/1
40 TABLET, EXTENDED RELEASE ORAL PRN
Status: DISCONTINUED | OUTPATIENT
Start: 2025-07-18 | End: 2025-07-26 | Stop reason: HOSPADM

## 2025-07-18 RX ORDER — ONDANSETRON 4 MG/1
4 TABLET, ORALLY DISINTEGRATING ORAL EVERY 8 HOURS PRN
Status: DISCONTINUED | OUTPATIENT
Start: 2025-07-18 | End: 2025-07-26 | Stop reason: HOSPADM

## 2025-07-18 RX ORDER — POTASSIUM CHLORIDE 7.45 MG/ML
10 INJECTION INTRAVENOUS PRN
Status: DISCONTINUED | OUTPATIENT
Start: 2025-07-18 | End: 2025-07-26 | Stop reason: HOSPADM

## 2025-07-18 RX ORDER — SODIUM CHLORIDE 0.9 % (FLUSH) 0.9 %
5-40 SYRINGE (ML) INJECTION PRN
Status: DISCONTINUED | OUTPATIENT
Start: 2025-07-18 | End: 2025-07-26 | Stop reason: HOSPADM

## 2025-07-18 RX ORDER — DEXTROSE MONOHYDRATE AND SODIUM CHLORIDE 5; .45 G/100ML; G/100ML
INJECTION, SOLUTION INTRAVENOUS CONTINUOUS
Status: DISCONTINUED | OUTPATIENT
Start: 2025-07-19 | End: 2025-07-19

## 2025-07-18 RX ORDER — ACETAMINOPHEN 650 MG/1
650 SUPPOSITORY RECTAL EVERY 6 HOURS PRN
Status: DISCONTINUED | OUTPATIENT
Start: 2025-07-18 | End: 2025-07-26 | Stop reason: HOSPADM

## 2025-07-18 RX ORDER — DEXTROSE MONOHYDRATE 100 MG/ML
INJECTION, SOLUTION INTRAVENOUS CONTINUOUS PRN
Status: DISCONTINUED | OUTPATIENT
Start: 2025-07-18 | End: 2025-07-26 | Stop reason: HOSPADM

## 2025-07-18 RX ORDER — ONDANSETRON 2 MG/ML
4 INJECTION INTRAMUSCULAR; INTRAVENOUS EVERY 6 HOURS PRN
Status: DISCONTINUED | OUTPATIENT
Start: 2025-07-18 | End: 2025-07-26 | Stop reason: HOSPADM

## 2025-07-18 RX ORDER — POLYETHYLENE GLYCOL 3350 17 G/17G
17 POWDER, FOR SOLUTION ORAL DAILY PRN
Status: DISCONTINUED | OUTPATIENT
Start: 2025-07-18 | End: 2025-07-26 | Stop reason: HOSPADM

## 2025-07-18 RX ORDER — ENOXAPARIN SODIUM 100 MG/ML
30 INJECTION SUBCUTANEOUS DAILY
Status: DISCONTINUED | OUTPATIENT
Start: 2025-07-19 | End: 2025-07-19 | Stop reason: ALTCHOICE

## 2025-07-18 RX ORDER — ASPIRIN 81 MG/1
81 TABLET ORAL DAILY
Status: DISCONTINUED | OUTPATIENT
Start: 2025-07-19 | End: 2025-07-26 | Stop reason: HOSPADM

## 2025-07-18 RX ORDER — SODIUM CHLORIDE, SODIUM LACTATE, POTASSIUM CHLORIDE, AND CALCIUM CHLORIDE .6; .31; .03; .02 G/100ML; G/100ML; G/100ML; G/100ML
1000 INJECTION, SOLUTION INTRAVENOUS ONCE
Status: COMPLETED | OUTPATIENT
Start: 2025-07-18 | End: 2025-07-19

## 2025-07-18 RX ORDER — MAGNESIUM SULFATE IN WATER 40 MG/ML
2000 INJECTION, SOLUTION INTRAVENOUS PRN
Status: DISCONTINUED | OUTPATIENT
Start: 2025-07-18 | End: 2025-07-26 | Stop reason: HOSPADM

## 2025-07-18 RX ORDER — ACETAMINOPHEN 325 MG/1
650 TABLET ORAL EVERY 6 HOURS PRN
Status: DISCONTINUED | OUTPATIENT
Start: 2025-07-18 | End: 2025-07-26 | Stop reason: HOSPADM

## 2025-07-18 RX ADMIN — SODIUM CHLORIDE, SODIUM LACTATE, POTASSIUM CHLORIDE, AND CALCIUM CHLORIDE 1000 ML: .6; .31; .03; .02 INJECTION, SOLUTION INTRAVENOUS at 23:49

## 2025-07-18 RX ADMIN — WATER 2000 MG: 1 INJECTION INTRAMUSCULAR; INTRAVENOUS; SUBCUTANEOUS at 23:58

## 2025-07-18 RX ADMIN — SODIUM CHLORIDE, SODIUM LACTATE, POTASSIUM CHLORIDE, AND CALCIUM CHLORIDE 1000 ML: .6; .31; .03; .02 INJECTION, SOLUTION INTRAVENOUS at 20:49

## 2025-07-18 ASSESSMENT — LIFESTYLE VARIABLES
HOW OFTEN DO YOU HAVE A DRINK CONTAINING ALCOHOL: NEVER
HOW MANY STANDARD DRINKS CONTAINING ALCOHOL DO YOU HAVE ON A TYPICAL DAY: PATIENT DOES NOT DRINK

## 2025-07-18 ASSESSMENT — PAIN - FUNCTIONAL ASSESSMENT: PAIN_FUNCTIONAL_ASSESSMENT: NONE - DENIES PAIN

## 2025-07-18 NOTE — ED PROVIDER NOTES
THE [facility]  EMERGENCY DEPARTMENT ENCOUNTER          EM RESIDENT NOTE       Date of evaluation: 7/18/2025    Chief Complaint     Anorexia      History of Present Illness     Viktoriya Osorio is a 69 y.o. female with HTN, DM2 c/b CKD3, HLD, CARLOS ALBERTO, Alzheimer's dementia who presents with decreased p.o. intake.  Over the last 5 days, patient has not been eating or drinking.  He has found water bottles around the house and cookies in her purse, but has not seen her take anything in.  Per , patient used to prepare all of her own meals using the microwave.  In recent weeks, she has not been preparing meals but would eat if he prepared meals for her.  In the last week, she has not eaten at all. She has also not been taking her medications. She usually leaves her pill minder on their nightstand after she takes her medications, but she had not been doing that in the last several days. He found her pill counter wrapped in a paper towel in her purse as well.      She has also been less communicative and speaking less, though nods and shakes her head.  Earlier today, her best friend called and she perked up and was able to stand without assistance and speak to her. She denies nausea/vomiting or abdominal pain.  No respiratory symptoms including cough or SOB.     called her PCP today, who recommended evaluation in the ED to rule out infection or any acute cause prior to follow-up in clinic.        Review of Systems     Review of Systems   Constitutional:  Positive for activity change and appetite change. Negative for chills, fatigue and fever.   HENT: Negative.     Eyes: Negative.    Respiratory:  Negative for cough and shortness of breath.    Cardiovascular:  Negative for chest pain.   Gastrointestinal:  Negative for abdominal pain, constipation, diarrhea, nausea and vomiting.   Genitourinary:  Positive for decreased urine volume. Negative for dysuria, frequency and urgency.   Musculoskeletal: Negative.

## 2025-07-18 NOTE — TELEPHONE ENCOUNTER
Spoke with patient's  [place]. Patient has not eaten or taken any medications since Sunday.  found cookies in her purse and water bottles on the table but hasn't seen her consume anything. He makes food but she will no eat. She is barely talking. She is weak and drowsy. He states they stayed in a hotel on 7/4//25 and patient stayed up all night and never went to bed. He states they came back home the next day and she did not sleep. He states she slept yesterday and all day today. I recommend he take patient to the ER for evaluation for change in mental status and not eating or drinking.

## 2025-07-19 LAB
ANION GAP SERPL CALCULATED.3IONS-SCNC: 14 MMOL/L (ref 3–16)
BASOPHILS # BLD: 0 K/UL (ref 0–0.2)
BASOPHILS NFR BLD: 0.5 %
BUN SERPL-MCNC: 41 MG/DL (ref 7–20)
CALCIUM SERPL-MCNC: 9.4 MG/DL (ref 8.3–10.6)
CHLORIDE SERPL-SCNC: 112 MMOL/L (ref 99–110)
CO2 SERPL-SCNC: 23 MMOL/L (ref 21–32)
CREAT SERPL-MCNC: 1.5 MG/DL (ref 0.6–1.2)
DEPRECATED RDW RBC AUTO: 14.3 % (ref 12.4–15.4)
EKG ATRIAL RATE: 62 BPM
EKG DIAGNOSIS: NORMAL
EKG P AXIS: 61 DEGREES
EKG P-R INTERVAL: 136 MS
EKG Q-T INTERVAL: 452 MS
EKG QRS DURATION: 68 MS
EKG QTC CALCULATION (BAZETT): 458 MS
EKG R AXIS: 17 DEGREES
EKG T AXIS: 31 DEGREES
EKG VENTRICULAR RATE: 62 BPM
EOSINOPHIL # BLD: 0.1 K/UL (ref 0–0.6)
EOSINOPHIL NFR BLD: 1.3 %
GFR SERPLBLD CREATININE-BSD FMLA CKD-EPI: 38 ML/MIN/{1.73_M2}
GLUCOSE BLD-MCNC: 121 MG/DL (ref 70–99)
GLUCOSE BLD-MCNC: 169 MG/DL (ref 70–99)
GLUCOSE BLD-MCNC: 225 MG/DL (ref 70–99)
GLUCOSE BLD-MCNC: 247 MG/DL (ref 70–99)
GLUCOSE BLD-MCNC: 253 MG/DL (ref 70–99)
GLUCOSE SERPL-MCNC: 191 MG/DL (ref 70–99)
HCT VFR BLD AUTO: 29.4 % (ref 36–48)
HGB BLD-MCNC: 9.8 G/DL (ref 12–16)
LACTATE BLDV-SCNC: 1.9 MMOL/L (ref 0.4–2)
LYMPHOCYTES # BLD: 1.9 K/UL (ref 1–5.1)
LYMPHOCYTES NFR BLD: 21.4 %
MAGNESIUM SERPL-MCNC: 1.92 MG/DL (ref 1.8–2.4)
MCH RBC QN AUTO: 26.9 PG (ref 26–34)
MCHC RBC AUTO-ENTMCNC: 33.3 G/DL (ref 31–36)
MCV RBC AUTO: 80.9 FL (ref 80–100)
MONOCYTES # BLD: 0.6 K/UL (ref 0–1.3)
MONOCYTES NFR BLD: 7.3 %
NEUTROPHILS # BLD: 6 K/UL (ref 1.7–7.7)
NEUTROPHILS NFR BLD: 69.5 %
PERFORMED ON: ABNORMAL
PLATELET # BLD AUTO: 297 K/UL (ref 135–450)
PMV BLD AUTO: 6.9 FL (ref 5–10.5)
POTASSIUM SERPL-SCNC: 3.4 MMOL/L (ref 3.5–5.1)
RBC # BLD AUTO: 3.64 M/UL (ref 4–5.2)
SODIUM SERPL-SCNC: 149 MMOL/L (ref 136–145)
WBC # BLD AUTO: 8.7 K/UL (ref 4–11)

## 2025-07-19 PROCEDURE — 93005 ELECTROCARDIOGRAM TRACING: CPT | Performed by: INTERNAL MEDICINE

## 2025-07-19 PROCEDURE — 93010 ELECTROCARDIOGRAM REPORT: CPT | Performed by: INTERNAL MEDICINE

## 2025-07-19 PROCEDURE — 2500000003 HC RX 250 WO HCPCS: Performed by: INTERNAL MEDICINE

## 2025-07-19 PROCEDURE — 6360000002 HC RX W HCPCS: Performed by: INTERNAL MEDICINE

## 2025-07-19 PROCEDURE — 51798 US URINE CAPACITY MEASURE: CPT

## 2025-07-19 PROCEDURE — 6370000000 HC RX 637 (ALT 250 FOR IP): Performed by: INTERNAL MEDICINE

## 2025-07-19 PROCEDURE — 1200000000 HC SEMI PRIVATE

## 2025-07-19 PROCEDURE — 83735 ASSAY OF MAGNESIUM: CPT

## 2025-07-19 PROCEDURE — 6360000002 HC RX W HCPCS

## 2025-07-19 PROCEDURE — 2580000003 HC RX 258: Performed by: INTERNAL MEDICINE

## 2025-07-19 PROCEDURE — 80048 BASIC METABOLIC PNL TOTAL CA: CPT

## 2025-07-19 PROCEDURE — 83605 ASSAY OF LACTIC ACID: CPT

## 2025-07-19 PROCEDURE — 36415 COLL VENOUS BLD VENIPUNCTURE: CPT

## 2025-07-19 PROCEDURE — 85025 COMPLETE CBC W/AUTO DIFF WBC: CPT

## 2025-07-19 RX ORDER — DEXTROSE MONOHYDRATE 50 MG/ML
INJECTION, SOLUTION INTRAVENOUS CONTINUOUS
Status: DISCONTINUED | OUTPATIENT
Start: 2025-07-19 | End: 2025-07-20

## 2025-07-19 RX ORDER — DIAZEPAM 10 MG/2ML
2.5 INJECTION, SOLUTION INTRAMUSCULAR; INTRAVENOUS
Status: COMPLETED | OUTPATIENT
Start: 2025-07-19 | End: 2025-07-19

## 2025-07-19 RX ORDER — HEPARIN SODIUM 5000 [USP'U]/ML
5000 INJECTION, SOLUTION INTRAVENOUS; SUBCUTANEOUS EVERY 8 HOURS SCHEDULED
Status: DISCONTINUED | OUTPATIENT
Start: 2025-07-19 | End: 2025-07-26 | Stop reason: HOSPADM

## 2025-07-19 RX ADMIN — ASPIRIN 81 MG: 81 TABLET, COATED ORAL at 07:57

## 2025-07-19 RX ADMIN — ZIPRASIDONE MESYLATE 10 MG: 20 INJECTION, POWDER, LYOPHILIZED, FOR SOLUTION INTRAMUSCULAR at 10:04

## 2025-07-19 RX ADMIN — DEXTROSE MONOHYDRATE AND SODIUM CHLORIDE: 5; .45 INJECTION, SOLUTION INTRAVENOUS at 01:48

## 2025-07-19 RX ADMIN — POTASSIUM BICARBONATE 20 MEQ: 782 TABLET, EFFERVESCENT ORAL at 20:47

## 2025-07-19 RX ADMIN — INSULIN LISPRO 2 UNITS: 100 INJECTION, SOLUTION INTRAVENOUS; SUBCUTANEOUS at 20:47

## 2025-07-19 RX ADMIN — HEPARIN SODIUM 5000 UNITS: 5000 INJECTION INTRAVENOUS; SUBCUTANEOUS at 05:56

## 2025-07-19 RX ADMIN — POTASSIUM BICARBONATE 20 MEQ: 782 TABLET, EFFERVESCENT ORAL at 12:16

## 2025-07-19 RX ADMIN — DIAZEPAM 2.5 MG: 5 INJECTION, SOLUTION INTRAMUSCULAR; INTRAVENOUS at 22:18

## 2025-07-19 RX ADMIN — INSULIN LISPRO 4 UNITS: 100 INJECTION, SOLUTION INTRAVENOUS; SUBCUTANEOUS at 12:16

## 2025-07-19 RX ADMIN — WATER 1000 MG: 1 INJECTION INTRAMUSCULAR; INTRAVENOUS; SUBCUTANEOUS at 20:46

## 2025-07-19 RX ADMIN — HEPARIN SODIUM 5000 UNITS: 5000 INJECTION INTRAVENOUS; SUBCUTANEOUS at 14:12

## 2025-07-19 RX ADMIN — SODIUM CHLORIDE, PRESERVATIVE FREE 10 ML: 5 INJECTION INTRAVENOUS at 20:48

## 2025-07-19 RX ADMIN — DEXTROSE: 5 SOLUTION INTRAVENOUS at 10:09

## 2025-07-19 RX ADMIN — HEPARIN SODIUM 5000 UNITS: 5000 INJECTION INTRAVENOUS; SUBCUTANEOUS at 20:51

## 2025-07-19 RX ADMIN — SODIUM CHLORIDE, PRESERVATIVE FREE 10 ML: 5 INJECTION INTRAVENOUS at 07:58

## 2025-07-19 RX ADMIN — INSULIN LISPRO 2 UNITS: 100 INJECTION, SOLUTION INTRAVENOUS; SUBCUTANEOUS at 17:16

## 2025-07-19 NOTE — CONSULTS
Ph: [phone], Fax: [phone]           [facility]               Reason for admission:                 Altered mental status    Brief Summary :     Viktoriya Osorio is being seen by nephrology for CKD/hyponatremia.      Interval History and plan:      On IV normal saline  Sodium has gone up to 149  Potassium low at 3.4  Creatinine 1.5 which is about her baseline    Plan:  Agree with switching to dextrose   discussed with Dr. Dorado  Blood pressure better than on admission though remains, high  Continue to follow the trend to see you further at this point of the medication needs to be done    Noticed patient belongs to Dr Kumar, Dr [facility]ep[facility] group  He will see from tomorrow.                   Assessment :     CKD Stage IIIb  Creatinine baseline 1.3-1.6  Creatinine was 1.8 at the time of consult    Hypokalemia  Potassium 3.4 at the time of consult           Hypernatremia  Sodium 149 at the time of consult       Hypertension   BP: (148-149)/(66-71)  Pulse:  []   BP goal inpatient 130-140 systolic inpatient      Alzheimer's dementia      [facility] would like to thank Ave Curtis MD   for opportunity to serve this patient      Please call with questions at-   24 Hrs Answering service [phone] or  7 am- 5 pm via Perfect serve or cell phone  Dr.Sudhir Connie MD       HPI :     Viktoriya Osorio is a 69 y.o. female presented to   the hospital on 7/18/2025 with past history of acute discomfort hypertension hyperlipidemia presented with several days of poor p.o. intake, altered mental status failure to thrive.  Came to the emergency room and was found to have UTI and on antibiotics now  Also lactic acidosis on presentation  On IV fluids  We are consulted for hyponatremia and CKD      PMH/PSH/SH/Family History:     Past Medical History:   Diagnosis Date    Carpal tunnel syndrome     bilateral    Hyperlipidemia     Hypertension     Iron deficiency anemia 2/14/2012    Type 2

## 2025-07-19 NOTE — ED NOTES
Call from lab. Critical  Lab     Lactic Acid  4.1      MD is made aware.     Doe Lacey, RN  07/18/25 [id number]

## 2025-07-19 NOTE — PROGRESS NOTES
V2.0    [facility] [place]      Name:  Viktoriya Osorio /Age/Sex: 1955  (69 y.o. female)   MRN & CSN:  [id number] & [id number] Encounter Date/Time: 2025 9:01 AM EDT   [facility]:  [facility]- PCP: Jessica Anderson MD     Attending:Ave Curtis MD       Hospital Day: 2    HPI:    Assessment and Recommendations     Hospital course:    Viktoriya Osorio is a 69 y.o. female with pmh of hypertension, diabetes, CKD stage III, Alzheimer's dementia, iron deficiency anemia who presents with p.o. intake.  Patient has not been eating and drinking for almost the last 5 days per family and has not been taking her medications.  Patient was admitted with acute hypernatremia with KAI with UTI (urinary tract infection)      Plan:     Hypernatremia-sodium trending up, switch IV fluids to D5 water  - Secondary to poor p.o. intake  - Free water deficit around 2.6 L  - Monitor sodium    KAI-creatinine improving  - Monitor BMP  - Continue IV fluids    Lactic acidosis  - Most likely secondary to dehydration with UTI  - Repeat lactic ordered    UTI  - Urine culture pending  - Started on ceftriaxone    Sepsis present on admission  - With tachycardia leukocytosis and lactic acidosis  - No blood culture sent from the ED.  Patient already got antibiotics    Diabetes mellitus-anticipate elevated blood sugars patient is on a D5 drip  - Started on sliding scale insulin  - Patient is on metformin and Januvia at home will hold  - As patient is on insulin monitor blood sugar for hypoglycemia  - Last A1c 7.6    Dementia, now with delirium  - Will give a dose of Geodon  - EKG ordered to evaluate QTc  - At baseline patient has severe cognitive impairment.    Hypercalcemia-due to dehydration improved with IV fluids    Goals of care discussed with patient's daughter.  For now patient is a full code      I spent _   minutes in the care of this patient.  Over 50% of that time was in face-to-face counseling regarding disease  results found for: \"TROPONINT\"  Lactic Acid:   Recent Labs     07/18/25  2152   LACTA 4.1*     BNP: No results for input(s): \"PROBNP\" in the last 72 hours.  UA:  Lab Results   Component Value Date/Time    NITRU Negative 07/18/2025 10:10 PM    COLORU Yellow 07/18/2025 10:10 PM    PHUR 6.0 07/18/2025 10:10 PM    PHUR 6.5 05/06/2019 09:20 PM    WBCUA 21-50 07/18/2025 10:10 PM    RBCUA 5-10 07/18/2025 10:10 PM    BACTERIA 4+ 07/18/2025 10:10 PM    CLARITYU Clear 07/18/2025 10:10 PM    SPECGRAV 1.025 04/30/2018 10:54 AM    LEUKOCYTESUR SMALL 07/18/2025 10:10 PM    UROBILINOGEN 0.2 07/18/2025 10:10 PM    BILIRUBINUR Negative 07/18/2025 10:10 PM    BLOODU LARGE 07/18/2025 10:10 PM    GLUCOSEU Negative 07/18/2025 10:10 PM    KETUA 15 07/18/2025 10:10 PM    AMORPHOUS Present 11/18/2024 05:07 PM     Urine Cultures:   Lab Results   Component Value Date/Time    LABURIN No growth at 18-36 hours 04/30/2018 11:28 AM     Blood Cultures: No results found for: \"BC\"  No results found for: \"BLOODCULT2\"  Organism:   Lab Results   Component Value Date/Time    [facility] (Strep agalacticae) 08/09/2016 01:51 PM         Electronically signed by Ave Curtis MD on 7/19/2025 at 9:01 AM  Comment: Please note this report has been produced using speech recognition software and may contain errors related to that system including errors in grammar, punctuation, and spelling, as well as words and phrases that may be inappropriate. If there are any questions or concerns, please feel free to contact the dictating provider for clarification.

## 2025-07-19 NOTE — PLAN OF CARE
[facility] [place] Hospital[place] brief note  Consult received.  Case reviewed with ER physician- UTI    Full note to follow.    Jessica Anderson MD    Thanks  SHONNA FOURNIER MD

## 2025-07-19 NOTE — PLAN OF CARE
Problem: Safety - Medical Restraint  Goal: Remains free of injury from restraints (Restraint for Interference with Medical Device)  Description: INTERVENTIONS:  1. Determine that other, less restrictive measures have been tried or would not be effective before applying the restraint  2. Evaluate the patient's condition at the time of restraint application  3. Inform patient/family regarding the reason for restraint  4. Q2H: Monitor safety, psychosocial status, comfort, nutrition and hydration  Outcome: Progressing  Flowsheets (Taken 7/19/2025 1363)  Remains free of injury from restraints (restraint for interference with medical device):   Determine that other, less restrictive measures have been tried or would not be effective before applying the restraint   Evaluate the patient's condition at the time of restraint application   Every 2 hours: Monitor safety, psychosocial status, comfort, nutrition and hydration

## 2025-07-19 NOTE — PLAN OF CARE
Problem: Safety - Adult  Goal: Free from fall injury  Outcome: Progressing  Flowsheets (Taken 7/19/2025 0320)  Free From Fall Injury: Instruct family/caregiver on patient safety     Problem: Chronic Conditions and Co-morbidities  Goal: Patient's chronic conditions and co-morbidity symptoms are monitored and maintained or improved  Outcome: Progressing  Flowsheets (Taken 7/19/2025 0320)  Care Plan - Patient's Chronic Conditions and Co-Morbidity Symptoms are Monitored and Maintained or Improved:   Monitor and assess patient's chronic conditions and comorbid symptoms for stability, deterioration, or improvement   Collaborate with multidisciplinary team to address chronic and comorbid conditions and prevent exacerbation or deterioration   Update acute care plan with appropriate goals if chronic or comorbid symptoms are exacerbated and prevent overall improvement and discharge     Problem: Discharge Planning  Goal: Discharge to home or other facility with appropriate resources  Outcome: Progressing  Flowsheets (Taken 7/19/2025 0320)  Discharge to home or other facility with appropriate resources:   Identify barriers to discharge with patient and caregiver   Identify discharge learning needs (meds, wound care, etc)   Refer to discharge planning if patient needs post-hospital services based on physician order or complex needs related to functional status, cognitive ability or social support system   Arrange for needed discharge resources and transportation as appropriate   Arrange for interpreters to assist at discharge as needed     Problem: Skin/Tissue Integrity  Goal: Skin integrity remains intact  Description: 1.  Monitor for areas of redness and/or skin breakdown  2.  Assess vascular access sites hourly  3.  Every 4-6 hours minimum:  Change oxygen saturation probe site  4.  Every 4-6 hours:  If on nasal continuous positive airway pressure, respiratory therapy assess nares and determine need for appliance change or

## 2025-07-19 NOTE — H&P
V2.0  History and Physical      Name:  Viktoriya Osorio /Age/Sex: 1955  (69 y.o. female)   MRN & CSN:  [id number] & [id number] Encounter Date/Time: 2025 12:00 AM EDT   Location:  [facility] [place]- PCP: Jessica Anderson MD       Hospital Day: 2    Assessment and Plan:   Viktoriya Osorio is a 69 y.o. female with significant past medical history of Alzheimer's dementia, type 2 diabetes, hypertension, hyperlipidemia who presents with several days of poor p.o. intake, acute mental status changes and failure to thrive-workup in the ER shows severe UTI with lactic acidosis and hence this admission-she also has significant metabolic abnormalities with acute hyponatremia, acute on chronic kidney disease and metabolic acidosis    #1 failure to thrive, poor p.o. intake, acute encephalopathy, UTI, lactic acidosis in the setting of advanced dementia-at baseline patient is able to ambulate and is independent in activities of daily living-she is able to cook for herself-son is present by the bedside has been very involved with the care    Start IV Rocephin  Start IV fluids  Monitor    2.  Hypernatremia, acute on chronic kidney disease, metabolic acidosis-     Consult nephrology  Baseline creatinine is close to 1.5  Her creatinine is 1.8  Her sodium is 148  Bicarb is 18  D545 at 100 cc/hr  Monitor labs daily    3. Chronic medical conditions    Alzheimer's dementia  Type 2 diabetes  Essential hypertension  Hyperlipidemia  Chronic kidney disease stage IV with baseline creatinine close to 1.5  Peripheral neuropathy  Iron deficiency anemia  Bilateral carpal tunnel syndrome    Hospital Problems           Last Modified POA    * (Principal) UTI (urinary tract infection) 2025 Yes     Disposition:   Current Living situation: home  Expected Disposition: home  Estimated D/C: TBD    Diet ADULT DIET; Full Liquid   DVT Prophylaxis Lovenox, heparin, SCDs, ambulation, Eliquis, Xarelto, Coumadin   Code Status Full Code

## 2025-07-19 NOTE — ED PROVIDER NOTES
ED Attending Attestation Note     Date of evaluation: 7/18/2025    This patient was seen by the resident.  I have seen and examined the patient, agree with the workup, evaluation, management and diagnosis. The care plan has been discussed.      This is a 69-year-old female with PMHx of Alzheimer's dementia, T2DM, HLD, HTN, CKD stage 3, and chronic anemia who is brought to the ED by her  for changes in her sleeping pattern and appetite.    Her son is present at bedside and provides her history, he is her full-time caretaker.  Over the last week or so, she has been waking up earlier in the morning and has been sleeping more throughout the day, which has been a new change.  Her appetite has also been decreased.  Typically, he does the cooking and she normally eats her meals without issue, but he has not seen her eat anything over the last 4-5 days.  He has found some snacks and cookies hidden in her purse so does mention that it is possible that she is eating things without his knowledge, but overall this has been a change in her oral intake.  She has been more tired than usual.  The patient herself has not had any complaints, specifically she denies any chest pain, shortness of breath or abdominal pain.  She has not had any vomiting or diarrhea to her 's knowledge.  Her last bowel movement was just prior to arrival.     On my assessment, she is resting comfortably in no acute distress.  Vital signs are notable for borderline tachycardia with heart rates in the high 90s.  She is mildly hypertensive.  She is afebrile and saturating well on room air.  Cardiopulmonary examination is unremarkable, her abdomen is soft and nontender.  We will proceed with laboratory assessment to evaluate her electrolytes, kidney function, and acid-base status given her decreased p.o. intake, will also give IV fluids as she does appear mildly hypovolemic on examination with some dry mucous membranes, and will reassess.

## 2025-07-19 NOTE — ED NOTES
Discussed with patient and family to let me know when she needs to use the restroom to attain a urine sample.     Doe Lacey RN  07/18/25 [id number]

## 2025-07-19 NOTE — PROGRESS NOTES
4 Eyes Skin Assessment     NAME:  Viktoriya Osorio  YOB: 1955  MEDICAL RECORD NUMBER:  [id number]    The patient is being assessed for  Admission    I agree that at least one RN has performed a thorough Head to Toe Skin Assessment on the patient. ALL assessment sites listed below have been assessed.      Areas assessed by both nurses:    Head, Face, Ears, Shoulders, Back, Chest, Arms, Elbows, Hands, Sacrum. Buttock, Coccyx, Ischium, Legs. Feet and Heels, and Under Medical Devices         Does the Patient have a Wound? No noted wound(s)       George Prevention initiated by RN: Yes  Wound Care Orders initiated by RN: No    For hospital-acquired stage 1 & 2 and ALL Stage 3,4, Unstageable, DTI, NWPT, and Complex wounds: place order “IP Wound Care/Ostomy Nurse Eval and Treat” by RN under : No    New Ostomies, if present place, Ostomy referral order under : No     Nurse 1 eSignature: Electronically signed by Vane Montanez RN on 7/20/25 at 7:29 AM EDT    **SHARE this note so that the co-signing nurse can place an eSignature**    Nurse 2 eSignature: Electronically signed by Shahana Santos RN on 7/19/25 at 6:57 AM EDT

## 2025-07-19 NOTE — PLAN OF CARE
Problem: Safety - Adult  Goal: Free from fall injury  7/19/2025 1501 by Cherie Jean, RN  Outcome: Progressing  Flowsheets (Taken 7/19/2025 1501)  Free From Fall Injury:   Based on caregiver fall risk screen, instruct family/caregiver to ask for assistance with transferring infant if caregiver noted to have fall risk factors   Instruct family/caregiver on patient safety  Note: Call light within reach. Bed alarm on. [employer] camera for safety     Problem: Skin/Tissue Integrity  Goal: Skin integrity remains intact  Description: 1.  Monitor for areas of redness and/or skin breakdown  2.  Assess vascular access sites hourly  3.  Every 4-6 hours minimum:  Change oxygen saturation probe site  4.  Every 4-6 hours:  If on nasal continuous positive airway pressure, respiratory therapy assess nares and determine need for appliance change or resting period  7/19/2025 1501 by Cherie Jean, RN  Outcome: Progressing  Flowsheets (Taken 7/19/2025 1501)  Skin Integrity Remains Intact:   Monitor for areas of redness and/or skin breakdown   Assess vascular access sites hourly   Every 4-6 hours minimum:  Change oxygen saturation probe site  Note: Pt able to turn and repositioning self frequently in bed

## 2025-07-20 PROBLEM — E87.0 HYPERNATREMIA: Status: ACTIVE | Noted: 2025-07-20

## 2025-07-20 PROBLEM — E87.6 HYPOKALEMIA: Status: ACTIVE | Noted: 2025-07-20

## 2025-07-20 PROBLEM — E83.42 HYPOMAGNESEMIA: Status: ACTIVE | Noted: 2025-07-20

## 2025-07-20 LAB
ANION GAP SERPL CALCULATED.3IONS-SCNC: 11 MMOL/L (ref 3–16)
BACTERIA UR CULT: NORMAL
BASOPHILS # BLD: 0 K/UL (ref 0–0.2)
BASOPHILS NFR BLD: 0.6 %
BUN SERPL-MCNC: 21 MG/DL (ref 7–20)
CALCIUM SERPL-MCNC: 9 MG/DL (ref 8.3–10.6)
CHLORIDE SERPL-SCNC: 107 MMOL/L (ref 99–110)
CO2 SERPL-SCNC: 23 MMOL/L (ref 21–32)
CREAT SERPL-MCNC: 1.2 MG/DL (ref 0.6–1.2)
DEPRECATED RDW RBC AUTO: 14.3 % (ref 12.4–15.4)
EOSINOPHIL # BLD: 0.1 K/UL (ref 0–0.6)
EOSINOPHIL NFR BLD: 1.6 %
GFR SERPLBLD CREATININE-BSD FMLA CKD-EPI: 49 ML/MIN/{1.73_M2}
GLUCOSE BLD-MCNC: 191 MG/DL (ref 70–99)
GLUCOSE BLD-MCNC: 203 MG/DL (ref 70–99)
GLUCOSE BLD-MCNC: 294 MG/DL (ref 70–99)
GLUCOSE BLD-MCNC: 320 MG/DL (ref 70–99)
GLUCOSE SERPL-MCNC: 242 MG/DL (ref 70–99)
HCT VFR BLD AUTO: 32 % (ref 36–48)
HGB BLD-MCNC: 10.8 G/DL (ref 12–16)
LACTATE BLDV-SCNC: 2.2 MMOL/L (ref 0.4–2)
LYMPHOCYTES # BLD: 1.5 K/UL (ref 1–5.1)
LYMPHOCYTES NFR BLD: 22.7 %
MAGNESIUM SERPL-MCNC: 1.61 MG/DL (ref 1.8–2.4)
MCH RBC QN AUTO: 27.3 PG (ref 26–34)
MCHC RBC AUTO-ENTMCNC: 33.6 G/DL (ref 31–36)
MCV RBC AUTO: 81.1 FL (ref 80–100)
MONOCYTES # BLD: 0.5 K/UL (ref 0–1.3)
MONOCYTES NFR BLD: 7.8 %
NEUTROPHILS # BLD: 4.5 K/UL (ref 1.7–7.7)
NEUTROPHILS NFR BLD: 67.3 %
PERFORMED ON: ABNORMAL
PLATELET # BLD AUTO: 282 K/UL (ref 135–450)
PMV BLD AUTO: 7.4 FL (ref 5–10.5)
POTASSIUM SERPL-SCNC: 3.4 MMOL/L (ref 3.5–5.1)
RBC # BLD AUTO: 3.94 M/UL (ref 4–5.2)
SODIUM SERPL-SCNC: 141 MMOL/L (ref 136–145)
WBC # BLD AUTO: 6.7 K/UL (ref 4–11)

## 2025-07-20 PROCEDURE — 2500000003 HC RX 250 WO HCPCS: Performed by: INTERNAL MEDICINE

## 2025-07-20 PROCEDURE — 2580000003 HC RX 258: Performed by: INTERNAL MEDICINE

## 2025-07-20 PROCEDURE — 80048 BASIC METABOLIC PNL TOTAL CA: CPT

## 2025-07-20 PROCEDURE — 97162 PT EVAL MOD COMPLEX 30 MIN: CPT

## 2025-07-20 PROCEDURE — 6360000002 HC RX W HCPCS

## 2025-07-20 PROCEDURE — 6370000000 HC RX 637 (ALT 250 FOR IP): Performed by: INTERNAL MEDICINE

## 2025-07-20 PROCEDURE — 6360000002 HC RX W HCPCS: Performed by: INTERNAL MEDICINE

## 2025-07-20 PROCEDURE — 83735 ASSAY OF MAGNESIUM: CPT

## 2025-07-20 PROCEDURE — 83605 ASSAY OF LACTIC ACID: CPT

## 2025-07-20 PROCEDURE — 1200000000 HC SEMI PRIVATE

## 2025-07-20 PROCEDURE — 85025 COMPLETE CBC W/AUTO DIFF WBC: CPT

## 2025-07-20 PROCEDURE — 97530 THERAPEUTIC ACTIVITIES: CPT

## 2025-07-20 PROCEDURE — 97535 SELF CARE MNGMENT TRAINING: CPT

## 2025-07-20 PROCEDURE — 99232 SBSQ HOSP IP/OBS MODERATE 35: CPT | Performed by: INTERNAL MEDICINE

## 2025-07-20 PROCEDURE — 97166 OT EVAL MOD COMPLEX 45 MIN: CPT

## 2025-07-20 PROCEDURE — 36415 COLL VENOUS BLD VENIPUNCTURE: CPT

## 2025-07-20 RX ORDER — LANOLIN ALCOHOL/MO/W.PET/CERES
400 CREAM (GRAM) TOPICAL DAILY
Status: DISCONTINUED | OUTPATIENT
Start: 2025-07-20 | End: 2025-07-26 | Stop reason: HOSPADM

## 2025-07-20 RX ORDER — DEXTROSE MONOHYDRATE AND SODIUM CHLORIDE 5; .45 G/100ML; G/100ML
INJECTION, SOLUTION INTRAVENOUS CONTINUOUS
Status: DISCONTINUED | OUTPATIENT
Start: 2025-07-20 | End: 2025-07-26 | Stop reason: HOSPADM

## 2025-07-20 RX ORDER — AMLODIPINE BESYLATE 2.5 MG/1
2.5 TABLET ORAL DAILY
Status: DISCONTINUED | OUTPATIENT
Start: 2025-07-20 | End: 2025-07-26 | Stop reason: HOSPADM

## 2025-07-20 RX ORDER — DEXTROSE MONOHYDRATE 50 MG/ML
INJECTION, SOLUTION INTRAVENOUS CONTINUOUS
Status: ACTIVE | OUTPATIENT
Start: 2025-07-20 | End: 2025-07-20

## 2025-07-20 RX ORDER — DIAZEPAM 10 MG/2ML
2.5 INJECTION, SOLUTION INTRAMUSCULAR; INTRAVENOUS
Status: COMPLETED | OUTPATIENT
Start: 2025-07-20 | End: 2025-07-20

## 2025-07-20 RX ADMIN — INSULIN LISPRO 6 UNITS: 100 INJECTION, SOLUTION INTRAVENOUS; SUBCUTANEOUS at 19:44

## 2025-07-20 RX ADMIN — SODIUM CHLORIDE, PRESERVATIVE FREE 10 ML: 5 INJECTION INTRAVENOUS at 07:43

## 2025-07-20 RX ADMIN — AMLODIPINE BESYLATE 2.5 MG: 2.5 TABLET ORAL at 10:14

## 2025-07-20 RX ADMIN — MAGNESIUM SULFATE HEPTAHYDRATE 2000 MG: 40 INJECTION, SOLUTION INTRAVENOUS at 06:24

## 2025-07-20 RX ADMIN — ASPIRIN 81 MG: 81 TABLET, COATED ORAL at 07:43

## 2025-07-20 RX ADMIN — DEXTROSE: 5 SOLUTION INTRAVENOUS at 06:24

## 2025-07-20 RX ADMIN — POTASSIUM BICARBONATE 40 MEQ: 782 TABLET, EFFERVESCENT ORAL at 06:26

## 2025-07-20 RX ADMIN — DIAZEPAM 2.5 MG: 5 INJECTION, SOLUTION INTRAMUSCULAR; INTRAVENOUS at 04:00

## 2025-07-20 RX ADMIN — INSULIN LISPRO 2 UNITS: 100 INJECTION, SOLUTION INTRAVENOUS; SUBCUTANEOUS at 08:57

## 2025-07-20 RX ADMIN — Medication 400 MG: at 11:53

## 2025-07-20 RX ADMIN — INSULIN LISPRO 2 UNITS: 100 INJECTION, SOLUTION INTRAVENOUS; SUBCUTANEOUS at 11:53

## 2025-07-20 RX ADMIN — HEPARIN SODIUM 5000 UNITS: 5000 INJECTION INTRAVENOUS; SUBCUTANEOUS at 16:24

## 2025-07-20 RX ADMIN — ZIPRASIDONE MESYLATE 10 MG: 20 INJECTION, POWDER, LYOPHILIZED, FOR SOLUTION INTRAMUSCULAR at 10:41

## 2025-07-20 RX ADMIN — DEXTROSE MONOHYDRATE AND SODIUM CHLORIDE: 5; .45 INJECTION, SOLUTION INTRAVENOUS at 17:23

## 2025-07-20 RX ADMIN — POTASSIUM BICARBONATE 20 MEQ: 782 TABLET, EFFERVESCENT ORAL at 07:43

## 2025-07-20 RX ADMIN — WATER 1000 MG: 1 INJECTION INTRAMUSCULAR; INTRAVENOUS; SUBCUTANEOUS at 21:49

## 2025-07-20 RX ADMIN — HEPARIN SODIUM 5000 UNITS: 5000 INJECTION INTRAVENOUS; SUBCUTANEOUS at 06:26

## 2025-07-20 RX ADMIN — HEPARIN SODIUM 5000 UNITS: 5000 INJECTION INTRAVENOUS; SUBCUTANEOUS at 21:49

## 2025-07-20 RX ADMIN — INSULIN LISPRO 4 UNITS: 100 INJECTION, SOLUTION INTRAVENOUS; SUBCUTANEOUS at 17:27

## 2025-07-20 RX ADMIN — SODIUM CHLORIDE, PRESERVATIVE FREE 10 ML: 5 INJECTION INTRAVENOUS at 19:44

## 2025-07-20 NOTE — PROGRESS NOTES
Pt's HR sustaining 120s-130s at rest. MD notified. No new orders at this time. Electronically signed by Rupert Jean RN on 7/20/2025 at 1:47 PM

## 2025-07-20 NOTE — PROGRESS NOTES
Occupational Therapy  Facility/Department: [facility] PCU  Occupational Therapy Initial Assessment and treatment    Name: Viktoriya Osorio  : 1955  MRN: [id number]  Date of Service: 2025    Discharge Recommendations:  Subacute/Skilled Nursing Facility, Continue to assess pending progress  OT Equipment Recommendations  Equipment Needed: No (defer)       Patient Diagnosis(es): The primary encounter diagnosis was Acute cystitis without hematuria. A diagnosis of Decreased oral intake was also pertinent to this visit.  Past Medical History:  has a past medical history of Carpal tunnel syndrome, Hyperlipidemia, Hypertension, Iron deficiency anemia, Type 2 diabetes mellitus with peripheral neuropathy (HCC), and Type II or unspecified type diabetes mellitus without mention of complication, not stated as uncontrolled.  Past Surgical History:  has a past surgical history that includes Tubal ligation; Endometrial ablation (); eye surgery (); Carpal tunnel release (Bilateral, 2006); and Colonoscopy (2016).    Treatment Diagnosis: functional mobility/ADL deficit      Assessment  Performance deficits / Impairments: Decreased functional mobility ;Decreased ADL status;Decreased endurance;Decreased balance;Decreased cognition;Decreased safe awareness  Assessment: Pt is a 69 y.o. female with H/o dementia presenting with UTI and increased confusion and cognitive deficits. Pt able to respond to questions however poor accuracy and with minimal verbalizations. Pt followed very few commands and required VCs and assist to initiate all actions. Pt required max assistance for supine to sit and attempted to stand twice with max assistance of two people however pt not initiating motor pattern. Anticipate pt to require assistance for all ADLs. Reccomend pt to receive therapy while admitted and trial SNF pending improvement with cognition for increased participation and safety with ADLs and functional  data.)  Restraints  Restraints Initially in Place: Yes (Simultaneous filing. User may not have seen previous data.)  Restraints: B soft wrist restraints reapplied after therapy (Simultaneous filing. User may not have seen previous data.)  Balance  Sitting: Impaired (pt sat EOB for ~8 minutes with SBA-mod assistance. Pt frequently leaning left and attempting to lay down.)  Transfer Training  Transfer Training: Yes  Overall Level of Assistance: Substantial/Maximal assistance;2 Person assistance (Pt attempted STS from EOB with max assistance x2 however unable to come to stand. Pt did not appear to follow command to initiate task of standing. Attempted with and without RW however unsuccsessful.)     AROM: Generally decreased, functional  Strength: Generally decreased, functional (difficult to assess due to cog)  ADL  Grooming: Setup;Verbal cueing  Grooming Skilled Clinical Factors: Pt washed face sitting EOB with setup. Pt perseverating with poor task termination.  Putting On/Taking Off Footwear: Dependent/Total  Putting On/Taking Off Footwear Skilled Clinical Factors: Don socks.  Product Used : Bath wipes        Bed mobility  Supine to Sit: Substantial/Maximal assistance  Sit to Supine: Substantial/Maximal assistance  Scootin Person assistance;Substantial/Maximal assistance (x2 up in bed, x1 to sitting EOB)     Vision  Vision: Within Functional Limits  Hearing  Hearing: Within Functional Limits  Cognition  Overall Cognitive Status: Exceptions  Arousal/Alertness: Delayed responses to stimuli  Following Commands: Inconsistently follows commands;Impaired  Attention Span: Impaired  Memory: Impaired  Safety Judgement: Impaired  Problem Solving: Impaired  Insights: Impaired  Initiation: Requires cues for all  Sequencing: Requires cues for all  Cognition Comment: Pt with minimal direction following and task initiation. Unable to follow through tasks such as transfers.  Pt with history of Alzheimer's

## 2025-07-20 NOTE — PROGRESS NOTES
V2.0    [facility] Progress Note      Name:  Viktoriya Osorio /Age/Sex: 1955  (69 y.o. female)   MRN & CSN:  [id number] & [id number] Encounter Date/Time: 2025 9:01 AM EDT   Location:  [facility]- PCP: Jessica Anderson MD     Attending:Ave Curtis MD       Hospital Day: 3    HPI:    Assessment and Recommendations     Hospital course:    Viktoriya Osorio is a 69 y.o. female with pmh of hypertension, diabetes, CKD stage III, Alzheimer's dementia, iron deficiency anemia who presents with p.o. intake.  Patient has not been eating and drinking for almost the last 5 days per family and has not been taking her medications.  Patient was admitted with acute hypernatremia with KAI with UTI (urinary tract infection)      Plan:     Hypernatremia-sodium improving, cut back IV fluids  - Secondary to poor p.o. intake  - Free water deficit around 2.6 L  - Monitor sodium    KAI-creatinine improving  - Monitor BMP  - Continue IV fluids at a lower limit    Lactic acidosis-improved  - Most likely secondary to dehydration with UTI  - Repeat lactic ordered    UTI  - Urine culture no growth to date but given her worsening encephalopathy will finish antibiotics  - Started on ceftriaxone    Sepsis present on admission improved  - With tachycardia leukocytosis and lactic acidosis  - No blood culture sent from the ED.  Patient already got antibiotics    Diabetes mellitus-anticipate elevated blood sugars patient is on a D5 drip  - Started on sliding scale insulin  - Patient is on metformin and Januvia at home will hold  - As patient is on insulin monitor blood sugar for hypoglycemia  - Last A1c 7.6    Dementia, now with delirium  - Add Geodon as needed as patient keeps getting agitated  - EKG ordered to evaluate QTc, QTc 458  - At baseline patient has severe cognitive impairment.    Hypokalemia/hypomagnesemia  - Replace and monitor    Hypercalcemia-due to dehydration improved with IV fluids    Goals of care discussed with  insulin lispro  0-8 Units SubCUTAneous 4x Daily AC & HS    aspirin  81 mg Oral Daily      Infusions:    dextrose 100 mL/hr at 07/20/25 0624    sodium chloride      dextrose       PRN Meds: sodium chloride flush, 5-40 mL, PRN  sodium chloride, , PRN  ondansetron, 4 mg, Q8H PRN   Or  ondansetron, 4 mg, Q6H PRN  polyethylene glycol, 17 g, Daily PRN  acetaminophen, 650 mg, Q6H PRN   Or  acetaminophen, 650 mg, Q6H PRN  potassium chloride, 40 mEq, PRN   Or  potassium alternative oral replacement, 40 mEq, PRN   Or  potassium chloride, 10 mEq, PRN  potassium chloride, 10 mEq, PRN  magnesium sulfate, 2,000 mg, PRN  glucose, 4 tablet, PRN  dextrose bolus, 125 mL, PRN   Or  dextrose bolus, 250 mL, PRN  glucagon (rDNA), 1 mg, PRN  dextrose, , Continuous PRN        Labs and Imaging   XR CHEST PORTABLE  Result Date: 7/18/2025  XR CHEST PORTABLE TECHNIQUE:  XR CHEST PORTABLE CLINICAL  INDICATION: r/o infection COMPARISON: None FINDINGS: The heart is normal in size.  The lungs are clear without significant consolidation or suspicious nodularity. The bones demonstrate no acute findings.     No acute findings are seen. Electronically signed by Solis Corey      CBC:   Recent Labs     07/18/25 2051 07/19/25  0551 07/20/25  0429   WBC 12.2* 8.7 6.7   HGB 11.3* 9.8* 10.8*    297 282     BMP:    Recent Labs     07/18/25 2051 07/19/25  0551 07/20/25  0429   * 149* 141   K 4.4 3.4* 3.4*   * 112* 107   CO2 18* 23 23   BUN 54* 41* 21*   CREATININE 1.8* 1.5* 1.2   GLUCOSE 153* 191* 242*     Hepatic:   Recent Labs     07/18/25 2051   AST 60*   ALT 29   BILITOT 0.7   ALKPHOS 63     Lipids:   Lab Results   Component Value Date/Time    CHOL 206 02/18/2025 11:51 AM    HDL 94 02/18/2025 11:51 AM    HDL 63 02/14/2012 10:10 AM    TRIG 66 02/18/2025 11:51 AM     Hemoglobin A1C:   Lab Results   Component Value Date/Time    LABA1C 7.6 05/19/2025 02:16 PM     TSH:   Lab Results   Component Value Date/Time    TSH 2.48 11/18/2024 12:09

## 2025-07-20 NOTE — PLAN OF CARE
Problem: Safety - Adult  Goal: Free from fall injury  7/20/2025 0130 by Vane Montanez RN  Outcome: Progressing  Flowsheets (Taken 7/20/2025 0130)  Free From Fall Injury: Instruct family/caregiver on patient safety     Problem: Chronic Conditions and Co-morbidities  Goal: Patient's chronic conditions and co-morbidity symptoms are monitored and maintained or improved  Outcome: Progressing  Flowsheets (Taken 7/20/2025 0130)  Care Plan - Patient's Chronic Conditions and Co-Morbidity Symptoms are Monitored and Maintained or Improved:   Monitor and assess patient's chronic conditions and comorbid symptoms for stability, deterioration, or improvement   Update acute care plan with appropriate goals if chronic or comorbid symptoms are exacerbated and prevent overall improvement and discharge   Collaborate with multidisciplinary team to address chronic and comorbid conditions and prevent exacerbation or deterioration     Problem: Discharge Planning  Goal: Discharge to home or other facility with appropriate resources  Outcome: Progressing  Flowsheets (Taken 7/20/2025 0130)  Discharge to home or other facility with appropriate resources:   Identify barriers to discharge with patient and caregiver   Refer to discharge planning if patient needs post-hospital services based on physician order or complex needs related to functional status, cognitive ability or social support system   Identify discharge learning needs (meds, wound care, etc)   Arrange for needed discharge resources and transportation as appropriate   Arrange for interpreters to assist at discharge as needed     Problem: Skin/Tissue Integrity  Goal: Skin integrity remains intact  Description: 1.  Monitor for areas of redness and/or skin breakdown  2.  Assess vascular access sites hourly  3.  Every 4-6 hours minimum:  Change oxygen saturation probe site  4.  Every 4-6 hours:  If on nasal continuous positive airway pressure, respiratory therapy assess nares  and determine need for appliance change or resting period  7/20/2025 0130 by Vane Montanez RN  Flowsheets (Taken 7/20/2025 0130)  Skin Integrity Remains Intact: Monitor for areas of redness and/or skin breakdown     Problem: Confusion  Goal: Confusion, delirium, dementia, or psychosis is improved or at baseline  Description: INTERVENTIONS:  1. Assess for possible contributors to thought disturbance, including medications, impaired vision or hearing, underlying metabolic abnormalities, dehydration, psychiatric diagnoses, and notify attending LIP  2. [place] high risk fall precautions, as indicated  3. Provide frequent short contacts to provide reality reorientation, refocusing and direction  4. Decrease environmental stimuli, including noise as appropriate  5. Monitor and intervene to maintain adequate nutrition, hydration, elimination, sleep and activity  6. If unable to ensure safety without constant attention obtain sitter and review sitter guidelines with assigned personnel  7. Initiate Psychosocial CNS and Spiritual Care consult, as indicated  Outcome: Progressing  Flowsheets (Taken 7/20/2025 0130)  Effect of thought disturbance (confusion, delirium, dementia, or psychosis) are managed with adequate functional status:   Assess for contributors to thought disturbance, including medications, impaired vision or hearing, underlying metabolic abnormalities, dehydration, psychiatric diagnoses, notify LIP   [place] high risk fall precautions, as indicated   Provide frequent short contacts to provide reality reorientation, refocusing and direction   Decrease environmental stimuli, including noise as appropriate   Monitor and intervene to maintain adequate nutrition, hydration, elimination, sleep and activity   If unable to ensure safety without constant attention obtain sitter and review sitter guidelines with assigned personnel     Problem: Safety - Medical Restraint  Goal: Remains free of injury from

## 2025-07-20 NOTE — PROGRESS NOTES
Renal Progress Note  Subjective:   Admit Date: 7/18/2025       Assessment/Plan   # Hypernatremia- resolved   Plan:  Stop IVF   Monitor BMP     # Hypokalemia Hypomagnesemia -   Likely nutritional / d5w infusion   replete     # HTN  Not controlled   Amlodipine 2.5 mg   Monitor BP         HPI     Viktoriya Osorio is a 69 y.o. female presented to   the hospital on 7/18/2025 with past history of acute discomfort hypertension hyperlipidemia presented with several days of poor p.o. intake, altered mental status failure to thrive.  Came to the emergency room and was found to have UTI and on antibiotics now  Also lactic acidosis on presentation  On IV fluids   consulted for hypernatremia and CKD     Interval History:   Na normalized 141   K 3.4  Mg 1.6  Cr better 1.2   BP elevated       DIET ADULT DIET; Full Liquid  Medications:   Scheduled Meds:   heparin (porcine)  5,000 Units SubCUTAneous 3 times per day    cefTRIAXone (ROCEPHIN) IV  1,000 mg IntraVENous Q24H    sodium chloride flush  5-40 mL IntraVENous 2 times per day    insulin lispro  0-8 Units SubCUTAneous 4x Daily AC & HS    aspirin  81 mg Oral Daily     Continuous Infusions:   dextrose 50 mL/hr at 07/20/25 0743    sodium chloride      dextrose         Labs:  CBC:   Recent Labs     07/18/25 2051 07/19/25  0551 07/20/25  0429   WBC 12.2* 8.7 6.7   HGB 11.3* 9.8* 10.8*    297 282     BMP:    Recent Labs     07/18/25 2051 07/19/25  0551 07/20/25  0429   * 149* 141   K 4.4 3.4* 3.4*   * 112* 107   CO2 18* 23 23   BUN 54* 41* 21*   CREATININE 1.8* 1.5* 1.2   GLUCOSE 153* 191* 242*     Ca/Mg/Phos:   Recent Labs     07/18/25 2051 07/19/25  0551 07/20/25  0429   CALCIUM 11.0* 9.4 9.0   MG  --  1.92 1.61*     Hepatic:   Recent Labs     07/18/25 2051   AST 60*   ALT 29   BILITOT 0.7   ALKPHOS 63     Troponin: No results for input(s): \"TROPONINI\" in the last 72 hours.  BNP: No results for input(s): \"BNP\" in the last 72 hours.  Lipids: No results for

## 2025-07-20 NOTE — PLAN OF CARE
Problem: Safety - Medical Restraint  Goal: Remains free of injury from restraints (Restraint for Interference with Medical Device)  Description: INTERVENTIONS:  1. Determine that other, less restrictive measures have been tried or would not be effective before applying the restraint  2. Evaluate the patient's condition at the time of restraint application  3. Inform patient/family regarding the reason for restraint  4. Q2H: Monitor safety, psychosocial status, comfort, nutrition and hydration  7/20/2025 1236 by Cherie Jean RN  Outcome: Not Progressing  Flowsheets (Taken 7/20/2025 1236)  Remains free of injury from restraints (restraint for interference with medical device):   Determine that other, less restrictive measures have been tried or would not be effective before applying the restraint   Evaluate the patient's condition at the time of restraint application   Every 2 hours: Monitor safety, psychosocial status, comfort, nutrition and hydration   Inform patient/family regarding the reason for restraint  Note: Pt still requiring WENDI wrist restraints. Assessing every 2 hours or as needed.  Problem: Chronic Conditions and Co-morbidities  Goal: Patient's chronic conditions and co-morbidity symptoms are monitored and maintained or improved  7/20/2025 1236 by Cherie Jean RN  Outcome: Progressing  Flowsheets (Taken 7/20/2025 1236)  Care Plan - Patient's Chronic Conditions and Co-Morbidity Symptoms are Monitored and Maintained or Improved:   Update acute care plan with appropriate goals if chronic or comorbid symptoms are exacerbated and prevent overall improvement and discharge   Collaborate with multidisciplinary team to address chronic and comorbid conditions and prevent exacerbation or deterioration   Monitor and assess patient's chronic conditions and comorbid symptoms for stability, deterioration, or improvement     Problem: Safety - Adult  Goal: Free from fall injury  7/20/2025 1236 by Cherie Jean,

## 2025-07-21 PROBLEM — Z51.5 ENCOUNTER FOR PALLIATIVE CARE: Status: ACTIVE | Noted: 2025-07-21

## 2025-07-21 PROBLEM — R63.8 DECREASED ORAL INTAKE: Status: ACTIVE | Noted: 2025-07-21

## 2025-07-21 LAB
ANION GAP SERPL CALCULATED.3IONS-SCNC: 11 MMOL/L (ref 3–16)
BASOPHILS # BLD: 0.1 K/UL (ref 0–0.2)
BASOPHILS NFR BLD: 0.8 %
BUN SERPL-MCNC: 12 MG/DL (ref 7–20)
CALCIUM SERPL-MCNC: 9.4 MG/DL (ref 8.3–10.6)
CHLORIDE SERPL-SCNC: 104 MMOL/L (ref 99–110)
CO2 SERPL-SCNC: 28 MMOL/L (ref 21–32)
CREAT SERPL-MCNC: 1.2 MG/DL (ref 0.6–1.2)
DEPRECATED RDW RBC AUTO: 14.5 % (ref 12.4–15.4)
EOSINOPHIL # BLD: 0.1 K/UL (ref 0–0.6)
EOSINOPHIL NFR BLD: 1.9 %
GFR SERPLBLD CREATININE-BSD FMLA CKD-EPI: 49 ML/MIN/{1.73_M2}
GLUCOSE BLD-MCNC: 112 MG/DL (ref 70–99)
GLUCOSE BLD-MCNC: 155 MG/DL (ref 70–99)
GLUCOSE BLD-MCNC: 208 MG/DL (ref 70–99)
GLUCOSE BLD-MCNC: 215 MG/DL (ref 70–99)
GLUCOSE BLD-MCNC: 216 MG/DL (ref 70–99)
GLUCOSE BLD-MCNC: 217 MG/DL (ref 70–99)
GLUCOSE SERPL-MCNC: 110 MG/DL (ref 70–99)
HCT VFR BLD AUTO: 35.5 % (ref 36–48)
HGB BLD-MCNC: 11.9 G/DL (ref 12–16)
LYMPHOCYTES # BLD: 1.7 K/UL (ref 1–5.1)
LYMPHOCYTES NFR BLD: 22.7 %
MCH RBC QN AUTO: 26.8 PG (ref 26–34)
MCHC RBC AUTO-ENTMCNC: 33.6 G/DL (ref 31–36)
MCV RBC AUTO: 79.6 FL (ref 80–100)
MONOCYTES # BLD: 0.7 K/UL (ref 0–1.3)
MONOCYTES NFR BLD: 9.1 %
NEUTROPHILS # BLD: 5 K/UL (ref 1.7–7.7)
NEUTROPHILS NFR BLD: 65.5 %
PERFORMED ON: ABNORMAL
PLATELET # BLD AUTO: 353 K/UL (ref 135–450)
PMV BLD AUTO: 7.6 FL (ref 5–10.5)
POTASSIUM SERPL-SCNC: 3.7 MMOL/L (ref 3.5–5.1)
RBC # BLD AUTO: 4.46 M/UL (ref 4–5.2)
SODIUM SERPL-SCNC: 143 MMOL/L (ref 136–145)
WBC # BLD AUTO: 7.7 K/UL (ref 4–11)

## 2025-07-21 PROCEDURE — 6360000002 HC RX W HCPCS: Performed by: INTERNAL MEDICINE

## 2025-07-21 PROCEDURE — 6370000000 HC RX 637 (ALT 250 FOR IP): Performed by: INTERNAL MEDICINE

## 2025-07-21 PROCEDURE — 36415 COLL VENOUS BLD VENIPUNCTURE: CPT

## 2025-07-21 PROCEDURE — 80048 BASIC METABOLIC PNL TOTAL CA: CPT

## 2025-07-21 PROCEDURE — 99221 1ST HOSP IP/OBS SF/LOW 40: CPT | Performed by: NURSE PRACTITIONER

## 2025-07-21 PROCEDURE — 2580000003 HC RX 258: Performed by: INTERNAL MEDICINE

## 2025-07-21 PROCEDURE — 1200000000 HC SEMI PRIVATE

## 2025-07-21 PROCEDURE — 99233 SBSQ HOSP IP/OBS HIGH 50: CPT | Performed by: HOSPITALIST

## 2025-07-21 PROCEDURE — 92610 EVALUATE SWALLOWING FUNCTION: CPT

## 2025-07-21 PROCEDURE — 2500000003 HC RX 250 WO HCPCS: Performed by: INTERNAL MEDICINE

## 2025-07-21 PROCEDURE — 85025 COMPLETE CBC W/AUTO DIFF WBC: CPT

## 2025-07-21 RX ADMIN — ZIPRASIDONE MESYLATE 10 MG: 20 INJECTION, POWDER, LYOPHILIZED, FOR SOLUTION INTRAMUSCULAR at 11:18

## 2025-07-21 RX ADMIN — INSULIN LISPRO 2 UNITS: 100 INJECTION, SOLUTION INTRAVENOUS; SUBCUTANEOUS at 12:27

## 2025-07-21 RX ADMIN — HEPARIN SODIUM 5000 UNITS: 5000 INJECTION INTRAVENOUS; SUBCUTANEOUS at 14:00

## 2025-07-21 RX ADMIN — ASPIRIN 81 MG: 81 TABLET, COATED ORAL at 08:41

## 2025-07-21 RX ADMIN — INSULIN LISPRO 2 UNITS: 100 INJECTION, SOLUTION INTRAVENOUS; SUBCUTANEOUS at 17:48

## 2025-07-21 RX ADMIN — AMLODIPINE BESYLATE 2.5 MG: 2.5 TABLET ORAL at 08:41

## 2025-07-21 RX ADMIN — Medication 400 MG: at 08:41

## 2025-07-21 RX ADMIN — SODIUM CHLORIDE, PRESERVATIVE FREE 20 ML: 5 INJECTION INTRAVENOUS at 20:14

## 2025-07-21 RX ADMIN — HEPARIN SODIUM 5000 UNITS: 5000 INJECTION INTRAVENOUS; SUBCUTANEOUS at 06:00

## 2025-07-21 RX ADMIN — HEPARIN SODIUM 5000 UNITS: 5000 INJECTION INTRAVENOUS; SUBCUTANEOUS at 20:14

## 2025-07-21 RX ADMIN — DEXTROSE MONOHYDRATE AND SODIUM CHLORIDE: 5; .45 INJECTION, SOLUTION INTRAVENOUS at 12:26

## 2025-07-21 RX ADMIN — INSULIN LISPRO 2 UNITS: 100 INJECTION, SOLUTION INTRAVENOUS; SUBCUTANEOUS at 20:14

## 2025-07-21 RX ADMIN — WATER 1000 MG: 1 INJECTION INTRAMUSCULAR; INTRAVENOUS; SUBCUTANEOUS at 20:14

## 2025-07-21 ASSESSMENT — PAIN SCALES - GENERAL
PAINLEVEL_OUTOF10: 0
PAINLEVEL_OUTOF10: 0

## 2025-07-21 NOTE — PROGRESS NOTES
Renal Progress Note  Subjective:     07/21/25        Assessment/Plan     # Hypernatremia - Resolved   - Secondary to poor PO intake   - Improved s/p IVF  - Monitor     # Hypokalemia, hypomagnesemia - Resolved   - Likely nutritional / d5w infusion   - Monitor     # KAI on CKD 3b  - Creatinine baseline 1.3-1.6  - Presented with creatinine of 1.8  - Suspect secondary to poor PO intake and hypercalcemia   - Creatinine currently at baseline  - Avoid nephrotoxins  - Continue to monitor     # HTN  - Added Amlodipine 2.5 mg   - Monitor     # Alzheimer's dementia  - Per primary       HPI   Viktoriya Osorio is a 69 y.o. female presented to   the hospital on 7/18/2025 with past history of acute discomfort hypertension hyperlipidemia presented with several days of poor p.o. intake, altered mental status failure to thrive.  Came to the emergency room and was found to have UTI and on antibiotics now  Also lactic acidosis on presentation  On IV fluids   consulted for hypernatremia and CKD     Interval History:     BP overall acceptable  Mildly tachycardic  Sodium improved  Potassium improved   Creatinine stable       DIET ADULT DIET; Dysphagia - Soft and Bite Sized; 4 carb choices (60 gm/meal)  ADULT ORAL NUTRITION SUPPLEMENT; Lunch, Dinner; Diabetic Oral Supplement  Medications:   Scheduled Meds:   amLODIPine  2.5 mg Oral Daily    magnesium oxide  400 mg Oral Daily    heparin (porcine)  5,000 Units SubCUTAneous 3 times per day    cefTRIAXone (ROCEPHIN) IV  1,000 mg IntraVENous Q24H    sodium chloride flush  5-40 mL IntraVENous 2 times per day    insulin lispro  0-8 Units SubCUTAneous 4x Daily AC & HS    aspirin  81 mg Oral Daily     Continuous Infusions:   dextrose 5 % and 0.45 % NaCl 50 mL/hr at 07/21/25 1226    sodium chloride      dextrose         Labs:  CBC:   Recent Labs     07/19/25  0551 07/20/25  0429 07/21/25  0246   WBC 8.7 6.7 7.7   HGB 9.8* 10.8* 11.9*    282 353     BMP:    Recent Labs     07/19/25  0551

## 2025-07-21 NOTE — CONSULTS
[facility]  Palliative Medicine Consultation Note      Date Of Admission:7/18/2025  Date of consult: 07/21/25  Seen by PC in the past:  No    Recommendations:        Saw pt at the bedside, she is drowsy and resting. Unable to participate in goals of care discussion.     Called pt's  Solis, introduced palliative care. Discussed his understanding of pt's condition. Solis reported prior to this admission pt lived at home with him, was able to ambulate independently, could dress herself, make simple meals and feed herself. He reported that she was forgetful but in general her quality of life was good. 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 He reported she stopped eating around July 4th, and was told that she had a UTI. He reported that her mentation seems to have improved, she is eating now with assistance but she is definitely not at baseline. We discussed that she may not return to baseline following this admission and he expressed understanding. He understands she will likely need SNF after this and has considered that she may also need long term care at some point.     We discussed code status. Pt's  Solis thinks she would remain a full code at this point.     D/w Dr Curtis    1. Goals of Care/Advanced Care planning/Code status: Full code, discussed today, pt's  is her legal NOK and surrogate decision maker. Goals are rehabilitative. Pt's  is hopeful she will improve but understands she may not return to her baseline.   2. Pain: pt unable to state, did not appear uncomfortable  3. SOB: pt did not appear short of breath  4. AMS likely 2/2 hypernatremia, KAI: improving but not back to baseline. She was admitted with concern for UTI however culture was negative. Pt with poor PO intake since July 4th, she is now eating with assistance. Consider liberalizing diet if felt to be helpful.   5.

## 2025-07-21 NOTE — PROGRESS NOTES
Speech Language Pathology  Facility/Department:[facility] PCU  Bedside Swallowing Evaluation  Name: Viktoriya Osorio  : 1955  MRN: [id number]                                                       Patient Diagnosis(es):   Patient Active Problem List    Diagnosis Date Noted    Cognitive impairment 10/11/2022    Right shoulder pain 10/11/2022    Hypokalemia 2025    Hypomagnesemia 2025    Hypernatremia 2025    UTI (urinary tract infection) 2025    Alzheimer's disease with early onset (HCC) 2024    Dementia without behavioral disturbance, psychotic disturbance, mood disturbance, or anxiety (HCC) 2024    Stage 3b chronic kidney disease (HCC) 2023    Stage 3a chronic kidney disease (HCC) 2023    Kidney function test abnormal 2021    Anemia 2021    Appetite loss 2021    Left hip pain 2020    Weight loss 10/24/2019    Poor appetite 10/24/2019    Hypoglycemia 2019    Uncontrolled type 2 diabetes mellitus with diabetic neuropathy 2019    Type 2 diabetes mellitus with peripheral neuropathy (HCC) 08/15/2016    Microalbuminuria 2013    Thumb pain 2013    Primary hypertension 2012    Diabetes mellitus, type II (HCC) 2012    Mixed hyperlipidemia 2012    Iron deficiency anemia 2012       Past Medical History:   Diagnosis Date    Carpal tunnel syndrome     bilateral    Hyperlipidemia     Hypertension     Iron deficiency anemia 2012    Type 2 diabetes mellitus with peripheral neuropathy (HCC) 8/15/2016    Type II or unspecified type diabetes mellitus without mention of complication, not stated as uncontrolled      Past Surgical History:   Procedure Laterality Date    CARPAL TUNNEL RELEASE Bilateral 2006    COLONOSCOPY  2016    [facility]    ENDOMETRIAL ABLATION  2006    EYE SURGERY  2007    cataract - left - approx date    TUBAL LIGATION         Reason for Referral:  Viktoriya Osorio  was

## 2025-07-21 NOTE — PROGRESS NOTES
V2.0    [facility] Progress Note      Name:  Viktoriya Osorio /Age/Sex: 1955  (69 y.o. female)   MRN & CSN:  [id number] & [id number] Encounter Date/Time: 2025 9:01 AM EDT   Location:  [facility]- PCP: Jessica Anderson MD     Attending:Ave Curtis MD       Hospital Day: 4    HPI:    Assessment and Recommendations     Hospital course:    Viktoriya Osorio is a 69 y.o. female with pmh of hypertension, diabetes, CKD stage III, Alzheimer's dementia, iron deficiency anemia who presents with p.o. intake.  Patient has not been eating and drinking for almost the last 5 days per family and has not been taking her medications.  Patient was admitted with acute hypernatremia with KAI with UTI (urinary tract infection)      Plan:     Hypernatremia-sodium improving, cut back IV fluids but will continue today as patient's p.o. intake is minimal  - Secondary to poor p.o. intake  - Free water deficit around 2.6 L  - Monitor sodium    KAI-creatinine improving  - Monitor BMP  - Continue IV fluids at a lower limit    Lactic acidosis-improved  - Most likely secondary to dehydration with UTI  - Repeat lactic ordered    UTI  - Urine culture no growth to date but given her worsening encephalopathy will finish antibiotics  - Started on ceftriaxone    Sepsis present on admission improved  - With tachycardia leukocytosis and lactic acidosis  - No blood culture sent from the ED.  Patient already got antibiotics    Diabetes mellitus-anticipate elevated blood sugars patient is on a D5 drip  - Started on sliding scale insulin  - Patient is on metformin and Januvia at home will hold  - As patient is on insulin monitor blood sugar for hypoglycemia  - Last A1c 7.6    Dementia, now with delirium-delirium slightly better  - Add Geodon as needed as patient keeps getting agitated  - EKG ordered to evaluate QTc, QTc 458  - At baseline patient has severe cognitive impairment.    Hypokalemia/hypomagnesemia  - Replace and

## 2025-07-21 NOTE — PROGRESS NOTES
Comprehensive Nutrition Assessment    RECOMMENDATIONS:  PO Diet: Soft and Bite Sized; 4cc  Nutrition Supplement: Add Glucerna BID  Nutrition Education: Education/Counseling not indicated     NUTRITION ASSESSMENT:   Nutritional summary & status: MST. Admitted w/ UTI, dehydration and KAI. Hypernatremia resolving but PO intake still negligible. Significant wt loss in the past month 12%/15# in 1 month. No muscle/fat wasting noted. PO intake poor, 25% of meals on dysphagia diet.  Pt w/ demenetia unable to partake in interview. No family in room. Pt in restraints, will place RN communication for 1:1 assist with meals.     Admission // PMH: UTI // hypertension, diabetes, CKD stage III, Alzheimer's dementia, iron deficiency anemia    MALNUTRITION ASSESSMENT  Context of Malnutrition: Acute Illness   Malnutrition Status: Moderate malnutrition  Findings of the 6 clinical characteristics of malnutrition (Minimum of 2 out of 6 clinical characteristics is required to make the diagnosis of moderate or severe Protein Calorie Malnutrition based on AND/ASPEN Guidelines):  Energy Intake:  75% or less of estimated energy requirements for 7 or more days  Weight Loss:  Greater than 5% over 1 month (12% in 1 month)     Body Fat Loss:  No body fat loss     Muscle Mass Loss:  No muscle mass loss    Fluid Accumulation:  No fluid accumulation     Strength:  Not Performed    NUTRITION DIAGNOSIS   Moderate malnutrition, in context of acute illness or injury related to inadequate protein-energy intake as evidenced by criteria as identified in malnutrition assessment    Nutrition Monitoring and Evaluation:   Food/Nutrient Intake Outcomes:  Progression of Nutrition, Food and Nutrient Intake, Supplement Intake  Physical Signs/Symptoms Outcomes:  Biochemical Data, Meal Time Behavior, Nutrition Focused Physical Findings, Skin, Weight     OBJECTIVE DATA: Significant to nutrition assessment  Nutrition Related Findings: , +1L, BM 7/21,

## 2025-07-22 LAB
ANION GAP SERPL CALCULATED.3IONS-SCNC: 12 MMOL/L (ref 3–16)
BUN SERPL-MCNC: 15 MG/DL (ref 7–20)
CALCIUM SERPL-MCNC: 8.9 MG/DL (ref 8.3–10.6)
CHLORIDE SERPL-SCNC: 101 MMOL/L (ref 99–110)
CO2 SERPL-SCNC: 25 MMOL/L (ref 21–32)
CREAT SERPL-MCNC: 1.7 MG/DL (ref 0.6–1.2)
GFR SERPLBLD CREATININE-BSD FMLA CKD-EPI: 33 ML/MIN/{1.73_M2}
GLUCOSE BLD-MCNC: 168 MG/DL (ref 70–99)
GLUCOSE BLD-MCNC: 169 MG/DL (ref 70–99)
GLUCOSE BLD-MCNC: 173 MG/DL (ref 70–99)
GLUCOSE BLD-MCNC: 178 MG/DL (ref 70–99)
GLUCOSE BLD-MCNC: 186 MG/DL (ref 70–99)
GLUCOSE BLD-MCNC: 270 MG/DL (ref 70–99)
GLUCOSE SERPL-MCNC: 192 MG/DL (ref 70–99)
PERFORMED ON: ABNORMAL
POTASSIUM SERPL-SCNC: 4.1 MMOL/L (ref 3.5–5.1)
SODIUM SERPL-SCNC: 138 MMOL/L (ref 136–145)

## 2025-07-22 PROCEDURE — 2500000003 HC RX 250 WO HCPCS: Performed by: INTERNAL MEDICINE

## 2025-07-22 PROCEDURE — 97116 GAIT TRAINING THERAPY: CPT

## 2025-07-22 PROCEDURE — 97530 THERAPEUTIC ACTIVITIES: CPT

## 2025-07-22 PROCEDURE — 99232 SBSQ HOSP IP/OBS MODERATE 35: CPT | Performed by: HOSPITALIST

## 2025-07-22 PROCEDURE — 6370000000 HC RX 637 (ALT 250 FOR IP): Performed by: INTERNAL MEDICINE

## 2025-07-22 PROCEDURE — 6360000002 HC RX W HCPCS: Performed by: INTERNAL MEDICINE

## 2025-07-22 PROCEDURE — 97535 SELF CARE MNGMENT TRAINING: CPT

## 2025-07-22 PROCEDURE — 80048 BASIC METABOLIC PNL TOTAL CA: CPT

## 2025-07-22 PROCEDURE — 1200000000 HC SEMI PRIVATE

## 2025-07-22 PROCEDURE — 36415 COLL VENOUS BLD VENIPUNCTURE: CPT

## 2025-07-22 RX ADMIN — ZIPRASIDONE MESYLATE 10 MG: 20 INJECTION, POWDER, LYOPHILIZED, FOR SOLUTION INTRAMUSCULAR at 14:36

## 2025-07-22 RX ADMIN — AMLODIPINE BESYLATE 2.5 MG: 2.5 TABLET ORAL at 09:16

## 2025-07-22 RX ADMIN — HEPARIN SODIUM 5000 UNITS: 5000 INJECTION INTRAVENOUS; SUBCUTANEOUS at 06:15

## 2025-07-22 RX ADMIN — WATER 1000 MG: 1 INJECTION INTRAMUSCULAR; INTRAVENOUS; SUBCUTANEOUS at 22:13

## 2025-07-22 RX ADMIN — Medication 400 MG: at 09:16

## 2025-07-22 RX ADMIN — INSULIN LISPRO 2 UNITS: 100 INJECTION, SOLUTION INTRAVENOUS; SUBCUTANEOUS at 22:14

## 2025-07-22 RX ADMIN — ASPIRIN 81 MG: 81 TABLET, COATED ORAL at 09:16

## 2025-07-22 RX ADMIN — SODIUM CHLORIDE, PRESERVATIVE FREE 10 ML: 5 INJECTION INTRAVENOUS at 22:10

## 2025-07-22 RX ADMIN — HEPARIN SODIUM 5000 UNITS: 5000 INJECTION INTRAVENOUS; SUBCUTANEOUS at 22:23

## 2025-07-22 RX ADMIN — INSULIN LISPRO 4 UNITS: 100 INJECTION, SOLUTION INTRAVENOUS; SUBCUTANEOUS at 18:01

## 2025-07-22 ASSESSMENT — PAIN SCALES - GENERAL
PAINLEVEL_OUTOF10: 0
PAINLEVEL_OUTOF10: 0

## 2025-07-22 NOTE — CARE COORDINATION
Case Management Assessment  Initial Evaluation    Date/Time of Evaluation: 7/22/2025 3:39 PM  Assessment Completed by: Nika Fox    If patient is discharged prior to next notation, then this note serves as note for discharge by case management.    Patient Name: Viktoriya Osorio                   YOB: 1955  Diagnosis: UTI (urinary tract infection) [N39.0]  Decreased oral intake [R63.8]  Acute cystitis without hematuria [N30.00]                   Date / Time: 7/18/2025  7:24 PM    Patient Admission Status: Inpatient   Readmission Risk (Low < 19, Mod (19-27), High > 27): Readmission Risk Score: 10.9    Current PCP: Jessica Anderson MD  PCP verified by CM? Yes    Chart Reviewed: Yes      History Provided by: Child/Family, Medical Record  Patient Orientation: Self    Patient Cognition: Dementia / Early Alzheimer's    Hospitalization in the last 30 days (Readmission):  No    If yes, Readmission Assessment in  Navigator will be completed.    Advance Directives:      Code Status: Full Code   Patient's Primary Decision Maker is: Legal Next of Kin    Primary Decision Maker: Solis Osorio - Spouse - [phone]    Secondary Decision Maker: Karen Rodriguez - Child - [phone]    Secondary Decision Maker: Lu Osorio - Child - [phone]    Discharge Planning:    Patient lives with: Spouse/Significant Other Type of Home: House  Primary Care Giver: Spouse  Patient Support Systems include: Spouse/Significant Other   Current Financial resources: Medicare  Current community resources: None  Current services prior to admission: None            Current DME:              Type of Home Care services:  None    ADLS  Prior functional level: Dressing, Housework, Mobility, Cooking  Current functional level: Housework, Cooking, Dressing, Mobility    PT AM-PAC: 16 /24  OT AM-PAC: 10 /24    Family can provide assistance at DC: Yes  Would you like Case Management to discuss the discharge plan with any

## 2025-07-22 NOTE — PROGRESS NOTES
Occupational Therapy  Occupational Therapy  Daily Treatment Note  Patient Name: Viktoriya Osorio  MRN: [id number]    Chart Reviewed: Yes       Other Position/Activity Restrictions: Up as tolerated     Additional Pertinent Hx: Pt to ED 7/18 with poor PO intake.  CXR (-).  Work-up (+) for UTI.  PMH:  HTN, DM2 c/b CKD3, HLD, CARLOS ALBERTO, Alzheimer's dementia        Diagnosis: UTI  Treatment Diagnosis: functional mobility/ADL deficit    Subjective: Pt seated in recliner upon entry, pleasantly confused and agreeable to therapy session.    Pain: Denies    Social/Functional History  Lives With: Spouse  Additional Comments: Pt from home where she lives with her . Pt showing decreased PO and cognition in recent weeks. Unable to obtain further information from pt due to poor cognition.  Prior Function  Additional Comments: Pt from home where she lives with her . Pt showing decreased PO and cognition in recent weeks. Unable to obtain further information from pt due to poor cognition.    Objective:    Cognition/Orientation:Pt able to state her name and her 's name. Pt is very confused and needs vcs for all initiation/sequencing and for redirection for attn to task with difficulty following. Pt has no incite into deficits or need for safety.    Bed mobility - N/A    Functional Mobility   Sit to Stand:Min A/Mod A  Stand to Sit:Min A  Bed to Chair Transfer: N/A  Commode Transfer:Min A  Ambulation: Min A with rw ~300ft in hallway and PCA followed with transport chair for safety. Pt has poor rw mgmt. Pt abandoned rw in room multiple times with no need for safety and needed Mod A. Multiple times pt picked items off of floor and wouldn't follow command not to and this required Mod A when she picked up items.    ADLs   Grooming: Pt used wet wipes to clean hands after toileting. Pt standing at sink with vcs to wash hands but pt didn't follow instructions  Bathing:N/A  UB dressing:N/A  LB dressing: Dependent to don brief, pt

## 2025-07-22 NOTE — PROGRESS NOTES
Speech Therapy   attempt    Patient was attempted to be seen by Speech Therapy Department this date for dysphagia. Patient was unable to be seen due to sleeping soundly. Speech Therapy will re-attempt to see patient tomorrow if/when patient is appropriate and available and as time permits    Mariah Carranza MA, CCC-SLP [phone]  Speech-Language Pathologist  Speech Dept pager [phone]

## 2025-07-22 NOTE — PROGRESS NOTES
Physician Progress Note      PATIENT:               MALINI ARELLANO  CSN #:                  [id number]  :                       1955  ADMIT DATE:       2025 7:24 PM  DISCH DATE:  RESPONDING  PROVIDER #:        Ave Curtis MD          QUERY TEXT:    Sepsis is documented in the medical record. The above clinical indicators are   also present.  Please document if this patient has:    The patient's clinical indicators include:  IM PN on : \"Sepsis present on admission improved - With tachycardia   leukocytosis and lactic acidosis\"  \"- Urine culture no growth to date but given her worsening encephalopathy will   finish antibiotics\"    kai, lactic acidosis, UTI    lactic, urine cx, Rocephin, IVF, LR bolus total 2000cc, labs  Options provided:  -- Severe Sepsis due to UTI present on admission with acute organ dysfunction   as evidenced by KAI and lactic acidosis.  -- Severe Sepsis due to unknown bacterial infection present on admission with   acute organ dysfunction as evidenced by KAI and lactic acidosis.  -- Other - I will add my own diagnosis  -- Disagree - Not applicable / Not valid  -- Disagree - Clinically unable to determine / Unknown  -- Refer to Clinical Documentation Reviewer    PROVIDER RESPONSE TEXT:    Severe Sepsis due to UTI present on admission with acute organ dysfunction as   evidenced by KAI and lactic acidosis.    Query created by: Matilde Chadwick on 2025 10:52 AM      QUERY TEXT:    Encephalopathy is documented in the medical record.  Patient also noted with   dementia with delirium.  Please specify type or if encephalopathy was ruled   out:    The clinical indicators include:  h/p: \"failure to thrive, poor p.o. intake, acute encephalopathy, UTI, lactic   acidosis in the setting of advanced dementia-at baseline patient is able to   ambulate and is independent in activities of daily living-she is able to cook   for herself-son is present by the bedside has been very

## 2025-07-22 NOTE — PROGRESS NOTES
V2.0    [facility] Progress Note      Name:  Viktoriya Osorio /Age/Sex: 1955  (69 y.o. female)   MRN & CSN:  [id number] & [id number] Encounter Date/Time: 2025 9:01 AM EDT   Location:  [facility]- PCP: Jessica Anderson MD     Attending:Ave Curtis MD       Hospital Day: 5    HPI:    Assessment and Recommendations     Hospital course:    Viktoriya Osorio is a 69 y.o. female with pmh of hypertension, diabetes, CKD stage III, Alzheimer's dementia, iron deficiency anemia who presents with p.o. intake.  Patient has not been eating and drinking for almost the last 5 days per family and has not been taking her medications.  Patient was admitted with acute hypernatremia with KAI with UTI (urinary tract infection)      Plan:     Hypernatremia-sodium improved.  Patient refusing to eat.  Anticipate patient will get hyponatremic again  - Secondary to poor p.o. intake  - Free water deficit around 2.6 L  - Monitor sodium    KAI-creatinine improving  - Monitor BMP  - Continue IV fluids at a lower limit    Lactic acidosis-improved  - Most likely secondary to dehydration with UTI  - Repeat lactic ordered    UTI  - Urine culture no growth to date but given her worsening encephalopathy will finish antibiotics  - Started on ceftriaxone to finish course    Sepsis present on admission improved  - With tachycardia leukocytosis and lactic acidosis  - No blood culture sent from the ED.  Patient already got antibiotics    Diabetes mellitus-blood sugars better controlled  - Started on sliding scale insulin  - Patient is on metformin and Januvia at home will hold  - As patient is on insulin monitor blood sugar for hypoglycemia  - Last A1c 7.6    Dementia, now with delirium-delirium slightly better  - Add Geodon as needed as patient keeps getting agitated  - EKG ordered to evaluate QTc, QTc 458  - At baseline patient has severe cognitive impairment.    Hypokalemia/hypomagnesemia  - Replace and monitor    Hypercalcemia-due  Regular rate.  Respiratory: Clear to auscultation  Gastrointestinal: Soft, non tender  Genitourinary: no suprapubic tenderness  Musculoskeletal: No edema  Skin: warm, dry  Neuro: Alert.  Psych: Confused        Medications:   Medications:    amLODIPine  2.5 mg Oral Daily    magnesium oxide  400 mg Oral Daily    heparin (porcine)  5,000 Units SubCUTAneous 3 times per day    cefTRIAXone (ROCEPHIN) IV  1,000 mg IntraVENous Q24H    sodium chloride flush  5-40 mL IntraVENous 2 times per day    insulin lispro  0-8 Units SubCUTAneous 4x Daily AC & HS    aspirin  81 mg Oral Daily      Infusions:    dextrose 5 % and 0.45 % NaCl 50 mL/hr at 07/21/25 1226    sodium chloride      dextrose       PRN Meds: ziprasidone (GEODON) 10 mg in sterile water 0.5 mL injection, 10 mg, Q12H PRN  sodium chloride flush, 5-40 mL, PRN  sodium chloride, , PRN  ondansetron, 4 mg, Q8H PRN   Or  ondansetron, 4 mg, Q6H PRN  polyethylene glycol, 17 g, Daily PRN  acetaminophen, 650 mg, Q6H PRN   Or  acetaminophen, 650 mg, Q6H PRN  potassium chloride, 40 mEq, PRN   Or  potassium alternative oral replacement, 40 mEq, PRN   Or  potassium chloride, 10 mEq, PRN  potassium chloride, 10 mEq, PRN  magnesium sulfate, 2,000 mg, PRN  glucose, 4 tablet, PRN  dextrose bolus, 125 mL, PRN   Or  dextrose bolus, 250 mL, PRN  glucagon (rDNA), 1 mg, PRN  dextrose, , Continuous PRN        Labs and Imaging   XR CHEST PORTABLE  Result Date: 7/18/2025  XR CHEST PORTABLE TECHNIQUE:  XR CHEST PORTABLE CLINICAL  INDICATION: r/o infection COMPARISON: None FINDINGS: The heart is normal in size.  The lungs are clear without significant consolidation or suspicious nodularity. The bones demonstrate no acute findings.     No acute findings are seen. Electronically signed by Solis Corey      CBC:   Recent Labs     07/20/25 0429 07/21/25  0246   WBC 6.7 7.7   HGB 10.8* 11.9*    353     BMP:    Recent Labs     07/20/25  0429 07/21/25  0246 07/22/25  0924    143 138   K 3.4*

## 2025-07-22 NOTE — PROGRESS NOTES
Physical Therapy  Facility/Department: [facility]  Daily Treatment Note  NAME: Viktoriya Osorio  : 1955  MRN: [id number]    Date of Service: 2025    Discharge Recommendations:  Subacute/Skilled Nursing Facility   PT Equipment Recommendations  Equipment Needed: No    Patient Diagnosis(es): The primary encounter diagnosis was Acute cystitis without hematuria. A diagnosis of Decreased oral intake was also pertinent to this visit.    Assessment  Assessment: Pt tolerated session well; requires extra time to perform all activities due to confusion. Presented with fair trunk and LE control during bed mobility; with the bed flat and no handrails pt requires trunk support and sequencing cues. Transfers and gait training were mildly unsteady inially requiring sequencing cues throughout; one LOB noted.      Equipment Needed: No    Plan  Physical Therapy Plan  General Plan:  (2-5)    Restrictions  Position Activity Restriction  Other Position/Activity Restrictions: Up as tolerated     Subjective   Subjective  Subjective: Pt was in bed willing to participate with encouargement  Pain: denies pain    Objective  Bed Mobility Training  Bed Mobility Training: Yes  Supine to Sit: Minimal assistance  Scooting: Minimal assistance    Balance  Sitting: Impaired  Sitting - Static: Fair (occasional)  Sitting - Dynamic: Fair (occasional)  Standing: Impaired  Standing - Static: Fair  Standing - Dynamic: Poor    Transfer Training  Transfer Training: Yes  Sit to Stand: Minimal assistance  Stand to Sit: Contact guard assistance    Gait  Gait Training: Yes  Overall Level of Assistance: Minimal assistance  Distance (ft): 10 Feet  Assistive Device: Walker, rolling (pt carries RW)  Gait Abnormalities: slow propulsion with a reciprocal pattern and a narrow ALLIE. Mild LOB during initial steps; carries RW.       PT Exercises  Sitting EOB for approx 15 mins      Safety Devices  Type of Devices: Nurse notified;Chair alarm in place;Left in

## 2025-07-22 NOTE — PLAN OF CARE
Problem: Chronic Conditions and Co-morbidities  Goal: Patient's chronic conditions and co-morbidity symptoms are monitored and maintained or improved  Outcome: Progressing  Flowsheets (Taken 7/21/2025 0126 by Vane Montanez RN)  Care Plan - Patient's Chronic Conditions and Co-Morbidity Symptoms are Monitored and Maintained or Improved:   Monitor and assess patient's chronic conditions and comorbid symptoms for stability, deterioration, or improvement   Collaborate with multidisciplinary team to address chronic and comorbid conditions and prevent exacerbation or deterioration   Update acute care plan with appropriate goals if chronic or comorbid symptoms are exacerbated and prevent overall improvement and discharge     Problem: SLP Adult - Impaired Swallowing  Goal: By Discharge: Advance to least restrictive diet without signs or symptoms of aspiration for planned discharge setting.  See evaluation for individualized goals.  7/21/2025 0935 by Mariah Carranza, SLP  Outcome: Progressing  Note: Intervention: Speech Evaluation/treatment  SLP completed evaluation. Please refer to notes in EMR.

## 2025-07-22 NOTE — PROGRESS NOTES
Renal Progress Note  Subjective:     07/22/25      Assessment/Plan:    # Hypernatremia - Resolved   - Secondary to poor PO intake   - Improved s/p IVF  - Monitor     # Hypokalemia, hypomagnesemia - Resolved   - Likely nutritional / d5w infusion   - Monitor     # KAI on CKD 3b  - Creatinine baseline 1.3-1.6  - Presented with creatinine of 1.8  - Suspect secondary to poor PO intake and hypercalcemia   - Creatinine currently at baseline  - Avoid nephrotoxins  - Continue to monitor     # HTN  - Added Amlodipine 2.5 mg   - Monitor     # Alzheimer's dementia  - Per primary     # UTI  - Abx management per primary       HPI   Viktoriya Osorio is a 69 y.o. female presented to   the hospital on 7/18/2025 with past history of acute discomfort hypertension hyperlipidemia presented with several days of poor p.o. intake, altered mental status failure to thrive.  Came to the emergency room and was found to have UTI and on antibiotics now  Also lactic acidosis on presentation  On IV fluids   consulted for hypernatremia and CKD     Interval History:     BMP pending this AM  Continues on IVF  BP's acceptable  On RA      DIET ADULT DIET; Dysphagia - Soft and Bite Sized; 4 carb choices (60 gm/meal)  ADULT ORAL NUTRITION SUPPLEMENT; Lunch, Dinner; Diabetic Oral Supplement  Medications:   Scheduled Meds:   amLODIPine  2.5 mg Oral Daily    magnesium oxide  400 mg Oral Daily    heparin (porcine)  5,000 Units SubCUTAneous 3 times per day    cefTRIAXone (ROCEPHIN) IV  1,000 mg IntraVENous Q24H    sodium chloride flush  5-40 mL IntraVENous 2 times per day    insulin lispro  0-8 Units SubCUTAneous 4x Daily AC & HS    aspirin  81 mg Oral Daily     Continuous Infusions:   dextrose 5 % and 0.45 % NaCl 50 mL/hr at 07/21/25 1226    sodium chloride      dextrose         Labs:  CBC:   Recent Labs     07/20/25 0429 07/21/25  0246   WBC 6.7 7.7   HGB 10.8* 11.9*    353     BMP:    Recent Labs     07/20/25  0429 07/21/25  0246 07/22/25  0924

## 2025-07-23 LAB
ANION GAP SERPL CALCULATED.3IONS-SCNC: 12 MMOL/L (ref 3–16)
BUN SERPL-MCNC: 20 MG/DL (ref 7–20)
CALCIUM SERPL-MCNC: 9.3 MG/DL (ref 8.3–10.6)
CHLORIDE SERPL-SCNC: 102 MMOL/L (ref 99–110)
CO2 SERPL-SCNC: 26 MMOL/L (ref 21–32)
CREAT SERPL-MCNC: 1.8 MG/DL (ref 0.6–1.2)
GFR SERPLBLD CREATININE-BSD FMLA CKD-EPI: 30 ML/MIN/{1.73_M2}
GLUCOSE BLD-MCNC: 132 MG/DL (ref 70–99)
GLUCOSE BLD-MCNC: 177 MG/DL (ref 70–99)
GLUCOSE BLD-MCNC: 232 MG/DL (ref 70–99)
GLUCOSE BLD-MCNC: 247 MG/DL (ref 70–99)
GLUCOSE SERPL-MCNC: 110 MG/DL (ref 70–99)
PERFORMED ON: ABNORMAL
POTASSIUM SERPL-SCNC: 3.7 MMOL/L (ref 3.5–5.1)
SODIUM SERPL-SCNC: 140 MMOL/L (ref 136–145)

## 2025-07-23 PROCEDURE — 6360000002 HC RX W HCPCS: Performed by: INTERNAL MEDICINE

## 2025-07-23 PROCEDURE — 36415 COLL VENOUS BLD VENIPUNCTURE: CPT

## 2025-07-23 PROCEDURE — 1200000000 HC SEMI PRIVATE

## 2025-07-23 PROCEDURE — 6370000000 HC RX 637 (ALT 250 FOR IP): Performed by: INTERNAL MEDICINE

## 2025-07-23 PROCEDURE — 2500000003 HC RX 250 WO HCPCS: Performed by: INTERNAL MEDICINE

## 2025-07-23 PROCEDURE — 92526 ORAL FUNCTION THERAPY: CPT

## 2025-07-23 PROCEDURE — 99232 SBSQ HOSP IP/OBS MODERATE 35: CPT | Performed by: HOSPITALIST

## 2025-07-23 PROCEDURE — 2580000003 HC RX 258: Performed by: INTERNAL MEDICINE

## 2025-07-23 PROCEDURE — 80048 BASIC METABOLIC PNL TOTAL CA: CPT

## 2025-07-23 RX ADMIN — INSULIN LISPRO 2 UNITS: 100 INJECTION, SOLUTION INTRAVENOUS; SUBCUTANEOUS at 18:53

## 2025-07-23 RX ADMIN — ASPIRIN 81 MG: 81 TABLET, COATED ORAL at 09:17

## 2025-07-23 RX ADMIN — HEPARIN SODIUM 5000 UNITS: 5000 INJECTION INTRAVENOUS; SUBCUTANEOUS at 15:00

## 2025-07-23 RX ADMIN — HEPARIN SODIUM 5000 UNITS: 5000 INJECTION INTRAVENOUS; SUBCUTANEOUS at 20:29

## 2025-07-23 RX ADMIN — HEPARIN SODIUM 5000 UNITS: 5000 INJECTION INTRAVENOUS; SUBCUTANEOUS at 06:55

## 2025-07-23 RX ADMIN — INSULIN LISPRO 4 UNITS: 100 INJECTION, SOLUTION INTRAVENOUS; SUBCUTANEOUS at 20:29

## 2025-07-23 RX ADMIN — ZIPRASIDONE MESYLATE 10 MG: 20 INJECTION, POWDER, LYOPHILIZED, FOR SOLUTION INTRAMUSCULAR at 22:18

## 2025-07-23 RX ADMIN — Medication 400 MG: at 09:17

## 2025-07-23 RX ADMIN — DEXTROSE MONOHYDRATE AND SODIUM CHLORIDE: 5; .45 INJECTION, SOLUTION INTRAVENOUS at 18:54

## 2025-07-23 RX ADMIN — AMLODIPINE BESYLATE 2.5 MG: 2.5 TABLET ORAL at 09:17

## 2025-07-23 RX ADMIN — WATER 1000 MG: 1 INJECTION INTRAMUSCULAR; INTRAVENOUS; SUBCUTANEOUS at 20:29

## 2025-07-23 ASSESSMENT — PAIN SCALES - GENERAL
PAINLEVEL_OUTOF10: 0

## 2025-07-23 NOTE — PROGRESS NOTES
Speech Language Pathology  Facility/Department:[facility]  Dysphagia treatment    Name: Viktoriya Osorio  : 1955  MRN: [id number]                                                     Patient Diagnosis(es):   Patient Active Problem List    Diagnosis Date Noted    Cognitive impairment 10/11/2022    Right shoulder pain 10/11/2022    Encounter for palliative care 2025    Decreased oral intake 2025    Hypokalemia 2025    Hypomagnesemia 2025    Hypernatremia 2025    UTI (urinary tract infection) 2025    Alzheimer's disease with early onset (HCC) 2024    Dementia without behavioral disturbance, psychotic disturbance, mood disturbance, or anxiety (HCC) 2024    Stage 3b chronic kidney disease (HCC) 2023    Stage 3a chronic kidney disease (HCC) 2023    Kidney function test abnormal 2021    Anemia 2021    Appetite loss 2021    Left hip pain 2020    Weight loss 10/24/2019    Poor appetite 10/24/2019    Hypoglycemia 2019    Uncontrolled type 2 diabetes mellitus with diabetic neuropathy 2019    Type 2 diabetes mellitus with peripheral neuropathy (HCC) 08/15/2016    Microalbuminuria 2013    Thumb pain 2013    Primary hypertension 2012    Diabetes mellitus, type II (HCC) 2012    Mixed hyperlipidemia 2012    Iron deficiency anemia 2012     Past Medical History:   Diagnosis Date    Carpal tunnel syndrome     bilateral    Hyperlipidemia     Hypertension     Iron deficiency anemia 2012    Type 2 diabetes mellitus with peripheral neuropathy (HCC) 8/15/2016    Type II or unspecified type diabetes mellitus without mention of complication, not stated as uncontrolled      Past Surgical History:   Procedure Laterality Date    CARPAL TUNNEL RELEASE Bilateral 2006    COLONOSCOPY  2016    Dayan FAN    ENDOMETRIAL ABLATION      EYE SURGERY  2007    cataract - left - approx date

## 2025-07-23 NOTE — PROGRESS NOTES
Summary (Last 24 hours) at 7/23/2025 0814  Last data filed at 7/22/2025 1810  Gross per 24 hour   Intake --   Output 75 ml   Net -75 ml      Vitals:   Vitals:    07/22/25 2015 07/23/25 0013 07/23/25 0340 07/23/25 0600   BP: 137/74 118/78 (!) 137/100    Pulse: (!) 107 96 87    Resp: 21 20 18    Temp: 99 °F (37.2 °C) 97.5 °F (36.4 °C) 98 °F (36.7 °C)    TempSrc: Oral Axillary Oral    SpO2:  97% 100%    Weight:    53.7 kg (118 lb 6.2 oz)   Height:             Physical Exam:      General: NAD  Eyes: EOMI  ENT: neck supple  Cardiovascular: Regular rate.  Respiratory: Clear to auscultation  Gastrointestinal: Soft, non tender  Genitourinary: no suprapubic tenderness  Musculoskeletal: No edema  Skin: warm, dry  Neuro: Alert.  Psych: Confused, oriented only to self        Medications:   Medications:    amLODIPine  2.5 mg Oral Daily    magnesium oxide  400 mg Oral Daily    heparin (porcine)  5,000 Units SubCUTAneous 3 times per day    cefTRIAXone (ROCEPHIN) IV  1,000 mg IntraVENous Q24H    sodium chloride flush  5-40 mL IntraVENous 2 times per day    insulin lispro  0-8 Units SubCUTAneous 4x Daily AC & HS    aspirin  81 mg Oral Daily      Infusions:    dextrose 5 % and 0.45 % NaCl 50 mL/hr at 07/22/25 2215    sodium chloride      dextrose       PRN Meds: ziprasidone (GEODON) 10 mg in sterile water 0.5 mL injection, 10 mg, Q12H PRN  sodium chloride flush, 5-40 mL, PRN  sodium chloride, , PRN  ondansetron, 4 mg, Q8H PRN   Or  ondansetron, 4 mg, Q6H PRN  polyethylene glycol, 17 g, Daily PRN  acetaminophen, 650 mg, Q6H PRN   Or  acetaminophen, 650 mg, Q6H PRN  potassium chloride, 40 mEq, PRN   Or  potassium alternative oral replacement, 40 mEq, PRN   Or  potassium chloride, 10 mEq, PRN  potassium chloride, 10 mEq, PRN  magnesium sulfate, 2,000 mg, PRN  glucose, 4 tablet, PRN  dextrose bolus, 125 mL, PRN   Or  dextrose bolus, 250 mL, PRN  glucagon (rDNA), 1 mg, PRN  dextrose, , Continuous PRN        Labs and Imaging   XR CHEST  PORTABLE  Result Date: 7/18/2025  XR CHEST PORTABLE TECHNIQUE:  XR CHEST PORTABLE CLINICAL  INDICATION: r/o infection COMPARISON: None FINDINGS: The heart is normal in size.  The lungs are clear without significant consolidation or suspicious nodularity. The bones demonstrate no acute findings.     No acute findings are seen. Electronically signed by Solis Corey      CBC:   Recent Labs     07/21/25  0246   WBC 7.7   HGB 11.9*        BMP:    Recent Labs     07/21/25  0246 07/22/25  0924 07/23/25  0512    138 140   K 3.7 4.1 3.7    101 102   CO2 28 25 26   BUN 12 15 20   CREATININE 1.2 1.7* 1.8*   GLUCOSE 110* 192* 110*     Hepatic:   No results for input(s): \"AST\", \"ALT\", \"BILITOT\", \"ALKPHOS\" in the last 72 hours.    Invalid input(s): \"ALB\"    Lipids:   Lab Results   Component Value Date/Time    CHOL 206 02/18/2025 11:51 AM    HDL 94 02/18/2025 11:51 AM    HDL 63 02/14/2012 10:10 AM    TRIG 66 02/18/2025 11:51 AM     Hemoglobin A1C:   Lab Results   Component Value Date/Time    LABA1C 7.6 05/19/2025 02:16 PM     TSH:   Lab Results   Component Value Date/Time    TSH 2.48 11/18/2024 12:09 PM    TSH 1.20 05/03/2019 09:41 PM     Troponin: No results found for: \"TROPONINT\"  Lactic Acid:   No results for input(s): \"LACTA\" in the last 72 hours.    BNP: No results for input(s): \"PROBNP\" in the last 72 hours.  UA:  Lab Results   Component Value Date/Time    NITRU Negative 07/18/2025 10:10 PM    COLORU Yellow 07/18/2025 10:10 PM    PHUR 6.0 07/18/2025 10:10 PM    PHUR 6.5 05/06/2019 09:20 PM    WBCUA 21-50 07/18/2025 10:10 PM    RBCUA 5-10 07/18/2025 10:10 PM    BACTERIA 4+ 07/18/2025 10:10 PM    CLARITYU Clear 07/18/2025 10:10 PM    SPECGRAV 1.025 04/30/2018 10:54 AM    LEUKOCYTESUR SMALL 07/18/2025 10:10 PM    UROBILINOGEN 0.2 07/18/2025 10:10 PM    BILIRUBINUR Negative 07/18/2025 10:10 PM    BLOODU LARGE 07/18/2025 10:10 PM    GLUCOSEU Negative 07/18/2025 10:10 PM    KETUA 15 07/18/2025 10:10 PM

## 2025-07-23 NOTE — PROGRESS NOTES
Physician Progress Note      PATIENT:               MALINI ARELLANO  CSN #:                  [id number]  :                       1955  ADMIT DATE:       2025 7:24 PM  DISCH DATE:  RESPONDING  PROVIDER #:        Ave Curtis MD          QUERY TEXT:    Please clarify the patient?s nutritional status:    The clinical indicators include:  dietician consult PN on :  MALNUTRITION ASSESSMENT  Context of Malnutrition: Acute Illness  Malnutrition Status: Moderate malnutrition  Findings of the 6 clinical characteristics of malnutrition (Minimum of 2 out   of 6 clinical characteristics is required to make the diagnosis of moderate or   severe Protein Calorie Malnutrition based on AND/ASPEN Guidelines):  Energy Intake:  75% or less of estimated energy requirements for 7 or more   days  Weight Loss:  Greater than 5% over 1 month (12% in 1 month)  Body Fat Loss:  No body fat loss  Muscle Mass Loss:  No muscle mass loss  Fluid Accumulation:  No fluid accumulation   Strength:  Not Performed    \"Moderate malnutrition, in context of acute illness or injury related to   inadequate protein-energy intake as evidenced by criteria as identified in   malnutrition assessment\"    dietician consult, PO Diet: Soft and Bite Sized; 4cc, Nutrition Supplement:   Add Glucerna BID, Nutrition Education: Education/Counseling not indicated  Options provided:  -- Protein calorie malnutrition moderate  -- Other - I will add my own diagnosis  -- Disagree - Not applicable / Not valid  -- Disagree - Clinically unable to determine / Unknown  -- Refer to Clinical Documentation Reviewer    PROVIDER RESPONSE TEXT:    This patient has moderate protein calorie malnutrition.    Query created by: Matilde Chadwick on 2025 6:34 AM      Electronically signed by:  Ave Curtis MD 2025 8:14 AM

## 2025-07-23 NOTE — PLAN OF CARE
Problem: Safety - Adult  Goal: Free from fall injury  Outcome: Progressing     Problem: Chronic Conditions and Co-morbidities  Goal: Patient's chronic conditions and co-morbidity symptoms are monitored and maintained or improved  Outcome: Progressing     Problem: Discharge Planning  Goal: Discharge to home or other facility with appropriate resources  Outcome: Progressing     Problem: Skin/Tissue Integrity  Goal: Skin integrity remains intact  Description: 1.  Monitor for areas of redness and/or skin breakdown  2.  Assess vascular access sites hourly  3.  Every 4-6 hours minimum:  Change oxygen saturation probe site  4.  Every 4-6 hours:  If on nasal continuous positive airway pressure, respiratory therapy assess nares and determine need for appliance change or resting period  Outcome: Progressing     Problem: Confusion  Goal: Confusion, delirium, dementia, or psychosis is improved or at baseline  Description: INTERVENTIONS:  1. Assess for possible contributors to thought disturbance, including medications, impaired vision or hearing, underlying metabolic abnormalities, dehydration, psychiatric diagnoses, and notify attending LIP  2. Guys high risk fall precautions, as indicated  3. Provide frequent short contacts to provide reality reorientation, refocusing and direction  4. Decrease environmental stimuli, including noise as appropriate  5. Monitor and intervene to maintain adequate nutrition, hydration, elimination, sleep and activity  6. If unable to ensure safety without constant attention obtain sitter and review sitter guidelines with assigned personnel  7. Initiate Psychosocial CNS and Spiritual Care consult, as indicated  Outcome: Progressing     Problem: ABCDS Injury Assessment  Goal: Absence of physical injury  Outcome: Progressing     Problem: Nutrition Deficit:  Goal: Optimize nutritional status  Outcome: Progressing     Problem: Pain  Goal: Verbalizes/displays adequate comfort level or baseline

## 2025-07-23 NOTE — PROGRESS NOTES
Renal Progress Note  Subjective:     07/23/25      Assessment/Plan:    # Hypernatremia   - Resolved   - Secondary to poor PO intake   - Improved s/p IVF  - Monitor     # Hypokalemia, hypomagnesemia   - Resolved   - Likely nutritional / d5w infusion   - Monitor     # KAI on CKD 3b  - Creatinine baseline 1.3-1.6  - Presented with creatinine of 1.8  - Suspect secondary to poor PO intake and hypercalcemia   - Creatinine currently at baseline  - Avoid nephrotoxins  - Continue to monitor     # HTN  - Added Amlodipine 2.5 mg   - Monitor     # Alzheimer's dementia  - Per primary     # UTI  - Abx management per primary       HPI   Viktoriya Osorio is a 69 y.o. female presented to   the hospital on 7/18/2025 with past history of acute discomfort hypertension hyperlipidemia presented with several days of poor p.o. intake, altered mental status failure to thrive.  Came to the emergency room and was found to have UTI and on antibiotics now  Also lactic acidosis on presentation  On IV fluids   consulted for hypernatremia and CKD     Interval History:     Discharging   BP's acceptable  On RA      DIET ADULT DIET; Dysphagia - Soft and Bite Sized; 4 carb choices (60 gm/meal)  ADULT ORAL NUTRITION SUPPLEMENT; Lunch, Dinner; Diabetic Oral Supplement  Medications:   Scheduled Meds:   amLODIPine  2.5 mg Oral Daily    magnesium oxide  400 mg Oral Daily    heparin (porcine)  5,000 Units SubCUTAneous 3 times per day    cefTRIAXone (ROCEPHIN) IV  1,000 mg IntraVENous Q24H    sodium chloride flush  5-40 mL IntraVENous 2 times per day    insulin lispro  0-8 Units SubCUTAneous 4x Daily AC & HS    aspirin  81 mg Oral Daily     Continuous Infusions:   dextrose 5 % and 0.45 % NaCl 50 mL/hr at 07/22/25 2215    sodium chloride      dextrose         Labs:  CBC:   Recent Labs     07/21/25  0246   WBC 7.7   HGB 11.9*        BMP:    Recent Labs     07/21/25  0246 07/22/25  0924 07/23/25  0512    138 140   K 3.7 4.1 3.7    101 102    CO2 28 25 26   BUN 12 15 20   CREATININE 1.2 1.7* 1.8*   GLUCOSE 110* 192* 110*     Ca/Mg/Phos:   Recent Labs     07/21/25  0246 07/22/25  0924 07/23/25  0512   CALCIUM 9.4 8.9 9.3     Hepatic:   No results for input(s): \"AST\", \"ALT\", \"BILITOT\", \"ALKPHOS\" in the last 72 hours.    Invalid input(s): \"ALB\"    Troponin: No results for input(s): \"TROPONINI\" in the last 72 hours.  BNP: No results for input(s): \"BNP\" in the last 72 hours.  Lipids: No results for input(s): \"CHOL\", \"TRIG\", \"HDL\" in the last 72 hours.    Invalid input(s): \"LDLCALC\", \"LABVLDL\"  ABGs: No results for input(s): \"PHART\", \"PO2ART\", \"HUY8XPP\" in the last 72 hours.  INR: No results for input(s): \"INR\" in the last 72 hours.  UA:  No results for input(s): \"COLORU\", \"CLARITYU\", \"GLUCOSEU\", \"BILIRUBINUR\", \"KETUA\", \"SPECGRAV\", \"BLOODU\", \"PHUR\", \"PROTEINU\", \"UROBILINOGEN\", \"NITRU\", \"LEUKOCYTESUR\", \"URINETYPE\" in the last 72 hours.    Invalid input(s): \"LABMICR\"     Urine Microscopic:   No results for input(s): \"LABCAST\", \"BACTERIA\", \"COMU\", \"HYALCAST\", \"WBCUA\", \"RBCUA\" in the last 72 hours.    Invalid input(s): \"EPIU\"    Urine Culture:   No results for input(s): \"LABURIN\" in the last 72 hours.    Urine Chemistry: No results for input(s): \"CLUR\", \"LABCREA\", \"PROTEINUR\", \"NAUR\" in the last 72 hours.    Objective:   Vitals: /81   Pulse 97   Temp 97.5 °F (36.4 °C) (Oral)   Resp 17   Ht 1.549 m (5' 1\")   Wt 53.7 kg (118 lb 6.2 oz)   SpO2 100%   BMI 22.37 kg/m²    Wt Readings from Last 3 Encounters:   07/23/25 53.7 kg (118 lb 6.2 oz)   05/19/25 58.5 kg (129 lb)   04/24/25 58.7 kg (129 lb 6.4 oz)      24HR INTAKE/OUTPUT:    Intake/Output Summary (Last 24 hours) at 7/23/2025 1512  Last data filed at 7/23/2025 1334  Gross per 24 hour   Intake 122 ml   Output 75 ml   Net 47 ml     Limited communication   NECK: supple, no JVD  Cardiovascular:  S1, S2 without m/r/g  Respiratory:  CTA B without w/r/r  Abdomen: +bs, soft, nt  Ext: no  LE edema  Skin Warm

## 2025-07-23 NOTE — PROGRESS NOTES
Spiritual Health Progress Note  [facility]      Room # 4313/4313-01    Name: Viktoriya Osorio           Age: 69 y.o.    Gender: female          MRN: [id number]  Yarsani:  [employer]       Preferred Language: English      Date: 07/23/25  Visit Time: Begin Time: (P) 1010 End Time : (P) 1020  Complexity of Encounter: (P) Low      Visit Summary:  met with patient, she shared about her determination to take things one day at a time. Patient also shared that remembering \"we never know what is going to happen\" is a mantra that brings her peace.     Referral/Consult From: Rounding  Encounter Overview/Reason: Spiritual/Emotional Needs  Encounter Code:     Crisis (if applicable):    Service Provided For: Patient     Patient was available.    Alba, Belief, Meaning:   Patient is connected with a alba tradition or spiritual practice  has beliefs or practices that help with coping during difficult times  Family/Friends No family/friends present  Rituals (if applicable)      Importance and Influence:  Patient has spiritual/personal beliefs that influence decisions regarding their health  Family/Friends No family/friends present    Community:  Patient   indicated that they feel well-supported  Support System Includes   (P) Spouse, Children   Family/Friends   No family/ friends present.    Assessment and Plan of Care:   Emotions Expressed by Patient:   Assessment: (P) Calm, Hopeful, Coping    Interventions by :   Intervention: (P) Active listening, Explored/Affirmed feelings, thoughts, concerns, Discussed illness injury and it’s impact, Discussed belief system/Judaism practices/alba     Result/ Response by Patient:   Outcome: (P) Engaged in conversation, Expressed Gratitude, Refused/Declined    Patient Plan of Care:   Plan and Referrals  Plan/Referrals: (P) No future visits requested     Emotions Expressed by Spouse/Family/Friends:   No family/ friends present when

## 2025-07-23 NOTE — CARE COORDINATION
DISCHARGE PLANNING:  Chart reviewed.  Patient is from home with her . She has an early onset Alzheimers since her mid 50's and has cognitively declined steadily since.     Current discharge plan is SNF. CM team spoke with patients spouse yesterday and referrals were sent.   1) [facility] -- first choice, not in network  2) [facility] -- accepted  3) [facility] -- accepted  I spoke with Ang[facility]lica with [facility] who stated she could likely take patient at another of their facilities. She is reviewing patient and plans to talk with patients spouse about potentially going to [facility].    CM team will continue to follow.  Marita Cabello, RN Case Manager  [phone]

## 2025-07-24 LAB
ANION GAP SERPL CALCULATED.3IONS-SCNC: 11 MMOL/L (ref 3–16)
BUN SERPL-MCNC: 19 MG/DL (ref 7–20)
CALCIUM SERPL-MCNC: 9 MG/DL (ref 8.3–10.6)
CHLORIDE SERPL-SCNC: 105 MMOL/L (ref 99–110)
CO2 SERPL-SCNC: 26 MMOL/L (ref 21–32)
CREAT SERPL-MCNC: 2.1 MG/DL (ref 0.6–1.2)
GFR SERPLBLD CREATININE-BSD FMLA CKD-EPI: 25 ML/MIN/{1.73_M2}
GLUCOSE BLD-MCNC: 129 MG/DL (ref 70–99)
GLUCOSE BLD-MCNC: 171 MG/DL (ref 70–99)
GLUCOSE BLD-MCNC: 175 MG/DL (ref 70–99)
GLUCOSE BLD-MCNC: 216 MG/DL (ref 70–99)
GLUCOSE SERPL-MCNC: 103 MG/DL (ref 70–99)
PERFORMED ON: ABNORMAL
POTASSIUM SERPL-SCNC: 3.8 MMOL/L (ref 3.5–5.1)
SODIUM SERPL-SCNC: 142 MMOL/L (ref 136–145)

## 2025-07-24 PROCEDURE — 80048 BASIC METABOLIC PNL TOTAL CA: CPT

## 2025-07-24 PROCEDURE — 6360000002 HC RX W HCPCS: Performed by: INTERNAL MEDICINE

## 2025-07-24 PROCEDURE — 6370000000 HC RX 637 (ALT 250 FOR IP): Performed by: INTERNAL MEDICINE

## 2025-07-24 PROCEDURE — 2580000003 HC RX 258: Performed by: INTERNAL MEDICINE

## 2025-07-24 PROCEDURE — 36415 COLL VENOUS BLD VENIPUNCTURE: CPT

## 2025-07-24 PROCEDURE — 97535 SELF CARE MNGMENT TRAINING: CPT

## 2025-07-24 PROCEDURE — 6370000000 HC RX 637 (ALT 250 FOR IP)

## 2025-07-24 PROCEDURE — 1200000000 HC SEMI PRIVATE

## 2025-07-24 PROCEDURE — 97530 THERAPEUTIC ACTIVITIES: CPT

## 2025-07-24 PROCEDURE — 99232 SBSQ HOSP IP/OBS MODERATE 35: CPT | Performed by: HOSPITALIST

## 2025-07-24 RX ORDER — DONEPEZIL HYDROCHLORIDE 10 MG/1
10 TABLET, FILM COATED ORAL NIGHTLY
Status: DISCONTINUED | OUTPATIENT
Start: 2025-07-24 | End: 2025-07-26 | Stop reason: HOSPADM

## 2025-07-24 RX ORDER — AMLODIPINE BESYLATE 2.5 MG/1
2.5 TABLET ORAL DAILY
Qty: 30 TABLET | Refills: 3 | Status: SHIPPED | OUTPATIENT
Start: 2025-07-25

## 2025-07-24 RX ADMIN — DEXTROSE MONOHYDRATE AND SODIUM CHLORIDE: 5; .45 INJECTION, SOLUTION INTRAVENOUS at 15:15

## 2025-07-24 RX ADMIN — ASPIRIN 81 MG: 81 TABLET, COATED ORAL at 09:11

## 2025-07-24 RX ADMIN — HEPARIN SODIUM 5000 UNITS: 5000 INJECTION INTRAVENOUS; SUBCUTANEOUS at 04:56

## 2025-07-24 RX ADMIN — HEPARIN SODIUM 5000 UNITS: 5000 INJECTION INTRAVENOUS; SUBCUTANEOUS at 14:45

## 2025-07-24 RX ADMIN — DONEPEZIL HYDROCHLORIDE 10 MG: 10 TABLET ORAL at 20:59

## 2025-07-24 RX ADMIN — AMLODIPINE BESYLATE 2.5 MG: 2.5 TABLET ORAL at 09:11

## 2025-07-24 RX ADMIN — INSULIN LISPRO 2 UNITS: 100 INJECTION, SOLUTION INTRAVENOUS; SUBCUTANEOUS at 19:03

## 2025-07-24 ASSESSMENT — PAIN SCALES - GENERAL
PAINLEVEL_OUTOF10: 0

## 2025-07-24 NOTE — PROGRESS NOTES
Palliative Care Chart Review  and Check in Note:     NAME:  Viktoriya Osorio  Admit Date: 7/18/2025  Hospital Day:  Hospital Day: 7   Current Code status: Full Code    Palliative care is continuing to following Ms. Osorio for symptom management,  and goals of care discussion as needed. Patient's chart reviewed today 7/24/25.      Saw pt at the bedside. She is alert, oriented to self only and pleasantly confused. Was able to assist her in eating some breakfast. Consider liberalization of diet given low BS requiring D5.       The following are the currently established goals/code status, and Symptom management.     Goals of care: pt's  is her legal NOK and surrogate decision maker. Goals are rehabilitative. Pt's  is hopeful she will improve but understands she may not return to her baseline.     Code status: Full code, discussed this admission.     Discharge plan: Likely SNF when medically ready for discharge.       ELISHA Brian - CNP  07/24/25  8:56 AM

## 2025-07-24 NOTE — PROGRESS NOTES
Speech-Language Pathology  Attempt      Chart reviewed, attempted to re-assess pt with lunch tray, however pt declined all options, max encouragement provided.  Rn states pt demonstrated very prolonged mastication with eggs this morning and then expectorated material. Will downgrade diet to minced/moist and follow up as pt participates and schedule allows      EBEN ZAZUETA M.S./CCC-SLP #[id number]  Speech/language Pathologist  Pg. # [phone]

## 2025-07-24 NOTE — PROGRESS NOTES
[facility] Medicine Progress Note  V 7.24      Date of Admission: 7/18/2025    Hospital Day: 7      Chief Admission Complaint:  Decreased appetite, AMS    Subjective:  Patient seen today morning. No acute concerns. AO3. No fevers, chills, nausea, vomiting, pain anywhere.    Presenting Admission History:       Hospital course:     Viktoriya Osorio is a 69 y.o. female with pmh of hypertension, diabetes, CKD stage III, Alzheimer's dementia, iron deficiency anemia who presents with p.o. intake.  Patient has not been eating and drinking for almost the last 5 days per family and has not been taking her medications.  Patient was admitted with acute hypernatremia with KAI with UTI (urinary tract infection).  Although UA did not grow anything given patient's clinical symptoms was treated for UTI with ceftriaxone patient finished the course.  Patient's hypernatremia improved with IV fluids.  Unfortunately with advanced dementia her intake is very poor and most likely patient will get hypernatremic again .  Palliative care was consulted, patient is a full code    Assessment/Plan:      #Hypernatremia - improved  #KAI on CKD3b  #Lactic acidosis - improved  #UTI - improved  #Sepsis - improved  SIRS: HR, WBC, lactic acid  Plan:  Nephrology consulted, appreciate recommendations  Trending Cr daily  Avoid nephrotoxic medications  IVF as needed  Patient completed course of Ceftriaxone  Continue to monitor    #DM2  #Dementia hx  Alzheimers likely  Plan:  POC glucose  ISS in place  Adjust regimen as needed  Donepezil 10mg in place  Diet as tolerated      Ongoing threat to life and/or bodily function without ongoing treatment due to:  None    Consults:      IP CONSULT TO NEPHROLOGY  IP CONSULT TO PALLIATIVE CARE        --------------------------------------------------      Medications:        Infusion Medications    dextrose 5 % and 0.45 % NaCl 50 mL/hr at 07/24/25 1515    sodium chloride      dextrose       Scheduled Medications

## 2025-07-24 NOTE — PROGRESS NOTES
Renal Progress Note  Subjective:     07/24/25      Assessment/Plan:    # Hypernatremia   - Resolved   - Secondary to poor PO intake   - Improved s/p IVF  - Monitor     # Hypokalemia, hypomagnesemia   - Resolved   - Likely nutritional / d5w infusion   - Monitor     # KAI on CKD 3b  - Creatinine baseline 1.3-1.6  - Presented with creatinine of 1.8  - Suspect secondary to poor PO intake and hypercalcemia   - Creatinine currently at baseline  - Avoid nephrotoxins  - Continue to monitor     # HTN  - Added Amlodipine 2.5 mg   - Monitor     # Alzheimer's dementia  - Per primary     # UTI  - Abx management per primary       HPI   Viktoriya Osorio is a 69 y.o. female presented to   the hospital on 7/18/2025 with past history of acute discomfort hypertension hyperlipidemia presented with several days of poor p.o. intake, altered mental status failure to thrive.  Came to the emergency room and was found to have UTI and on antibiotics now  Also lactic acidosis on presentation  On IV fluids   consulted for hypernatremia and CKD     Interval History:     Discharging   BP's acceptable  On RA      DIET ADULT ORAL NUTRITION SUPPLEMENT; Lunch, Dinner; Diabetic Oral Supplement  ADULT DIET; Dysphagia - Minced and Moist; 4 carb choices (60 gm/meal)  Medications:   Scheduled Meds:   donepezil  10 mg Oral Nightly    amLODIPine  2.5 mg Oral Daily    magnesium oxide  400 mg Oral Daily    heparin (porcine)  5,000 Units SubCUTAneous 3 times per day    sodium chloride flush  5-40 mL IntraVENous 2 times per day    insulin lispro  0-8 Units SubCUTAneous 4x Daily AC & HS    aspirin  81 mg Oral Daily     Continuous Infusions:   dextrose 5 % and 0.45 % NaCl 50 mL/hr at 07/24/25 1515    sodium chloride      dextrose         Labs:  CBC:   No results for input(s): \"WBC\", \"HGB\", \"PLT\" in the last 72 hours.    BMP:    Recent Labs     07/22/25  0924 07/23/25  0512 07/24/25  0359    140 142   K 4.1 3.7 3.8    102 105   CO2 25 26 26   BUN 15  20 19   CREATININE 1.7* 1.8* 2.1*   GLUCOSE 192* 110* 103*     Ca/Mg/Phos:   Recent Labs     07/22/25  0924 07/23/25  0512 07/24/25  0359   CALCIUM 8.9 9.3 9.0     Hepatic:   No results for input(s): \"AST\", \"ALT\", \"BILITOT\", \"ALKPHOS\" in the last 72 hours.    Invalid input(s): \"ALB\"    Troponin: No results for input(s): \"TROPONINI\" in the last 72 hours.  BNP: No results for input(s): \"BNP\" in the last 72 hours.  Lipids: No results for input(s): \"CHOL\", \"TRIG\", \"HDL\" in the last 72 hours.    Invalid input(s): \"LDLCALC\", \"LABVLDL\"  ABGs: No results for input(s): \"PHART\", \"PO2ART\", \"[id number]\" in the last 72 hours.  INR: No results for input(s): \"INR\" in the last 72 hours.  UA:  No results for input(s): \"COLORU\", \"CLARITYU\", \"GLUCOSEU\", \"BILIRUBINUR\", \"KETUA\", \"SPECGRAV\", \"BLOODU\", \"PHUR\", \"PROTEINU\", \"UROBILINOGEN\", \"NITRU\", \"LEUKOCYTESUR\", \"URINETYPE\" in the last 72 hours.    Invalid input(s): \"LABMICR\"     Urine Microscopic:   No results for input(s): \"LABCAST\", \"BACTERIA\", \"COMU\", \"HYALCAST\", \"WBCUA\", \"RBCUA\" in the last 72 hours.    Invalid input(s): \"EPIU\"    Urine Culture:   No results for input(s): \"LABURIN\" in the last 72 hours.    Urine Chemistry: No results for input(s): \"CLUR\", \"LABCREA\", \"PROTEINUR\", \"NAUR\" in the last 72 hours.    Objective:   Vitals: /81   Pulse (!) 125   Temp 98.2 °F (36.8 °C) (Oral)   Resp 16   Ht 1.549 m (5' 1\")   Wt 57.1 kg (125 lb 14.1 oz)   SpO2 96%   BMI 23.79 kg/m²    Wt Readings from Last 3 Encounters:   07/24/25 57.1 kg (125 lb 14.1 oz)   05/19/25 58.5 kg (129 lb)   04/24/25 58.7 kg (129 lb 6.4 oz)      24HR INTAKE/OUTPUT:    Intake/Output Summary (Last 24 hours) at 7/24/2025 1717  Last data filed at 7/23/2025 2245  Gross per 24 hour   Intake 2963.27 ml   Output 0 ml   Net 2963.27 ml     Limited communication   NECK: supple, no JVD  Cardiovascular:  S1, S2 without m/r/g  Respiratory:  CTA B without w/r/r  Abdomen: +bs, soft, nt  Ext: no  LE edema  Skin Warm and dry

## 2025-07-24 NOTE — PROGRESS NOTES
Physical Therapy  Pt currently working with RN staff; PT will re attempt at a later time.   Pedro Ho PTA

## 2025-07-24 NOTE — PROGRESS NOTES
Occupational Therapy  Occupational Therapy  Daily Treatment Note  Patient Name: Viktoriya Osorio  MRN: [id number]    Chart Reviewed: Yes       Other Position/Activity Restrictions: Up as tolerated     Additional Pertinent Hx: Pt to ED 7/18 with poor PO intake.  CXR (-).  Work-up (+) for UTI.  PMH:  HTN, DM2 c/b CKD3, HLD, CARLOS ALBERTO, Alzheimer's dementia        Diagnosis: UTI  Treatment Diagnosis: functional mobility/ADL deficit    Subjective:  Pt met seated in recliner chair and agreeable to OT session. Pt pleasantly confused throughout session.     Pain: No pain reported    Social/Functional History  Lives With: Spouse  Type of Home: House  Additional Comments: Pt from home where she lives with her . Pt showing decreased PO and cognition in recent weeks. Unable to obtain further information from pt due to poor cognition.  Prior Function  Additional Comments: Pt from home where she lives with her . Pt showing decreased PO and cognition in recent weeks. Unable to obtain further information from pt due to poor cognition.    Objective:    Cognition/Orientation:  Pt with dementia. Pleasantly confused throughout session.     Scooting: SBA for scooting back in chair    Functional Mobility   Sit to Stand: Min A from recliner chair and toilet with heavy cues for sequencing and understanding task. At one point pt placing R foot on RW rung.   Stand to Sit: CGA to toilet and Recliner chair  Bed to Chair Transfer:  CGA to and from recliner chair with increased time for sequencing/understanding stance  Commode Transfer: Min A with increased time for stance from toilet due to cognition  Other:  Functional mobility completed to and from bathroom with RW and CGA to Min A with heavy cues for appropriate use of RW. Pt very confused by function of RW. Functional mobility completed in hallway with CGA to Min A without RW with mild LOB to L. Heavy cues for navigation back to room.     ADLs   Grooming: Min A with brief moments of

## 2025-07-24 NOTE — CARE COORDINATION
DISCHARGE PLANNING:  Chart reviewed.  Patient is from home with her . She has an early onset Alzheimers since her mid 50's and has cognitively declined steadily since.     Current discharge plan is SNF. CM team spoke with patients spouse yesterday and referrals were sent.   1) [facility] -- first choice, not in network  2) [facility] -- accepted  3) [facility] -- accepted    I spoke with Angélica this afternoon.  Pt now has discharge order but we are trying to get pt to [facility] (who is no in her network) - Angélica suggest that we send referrals out to her other facilities:  1) [facility]  [facility]) [facility]  [facility]) [facility]  4) [facility]  Trying to get her into one of these as SNF (current recs) so we can transition her to [facility] memory care.      Unable to get in touch with  to discuss options.  If not able to talk in one of these facilities, family will have to make a decision of [facility] and [facility] and start precert.    Case mgt will continue to follow.

## 2025-07-24 NOTE — PROGRESS NOTES
Patient pleasantly confused with PMH of dementia. Only oriented to self at this time, pt reoriented frequently. Pt appeared to rest well this shift. VSS on room air. Medications given per MAR, no side effects noted. No complaints of pain, continuing to monitor and manage per MAR.      Patient is currently resting in bed with bed alarm on for safety. Call light within reach and all fall precautions in place. Plan of care continues.    Electronically signed by Melony Brunner RN on 7/24/2025 at 6:15 AM

## 2025-07-24 NOTE — DISCHARGE INSTR - COC
Continuity of Care Form    Patient Name: Viktoriya Osorio   :  1955  MRN:  [id number]    Admit date:  2025  Discharge date:  2025    Code Status Order: Full Code   Advance Directives:     Admitting Physician:  Marcus Brooks MD  PCP: Jessica Anderson MD    Discharging Nurse: FABIANO Morris  Discharging Hospital Unit/Room#: 4313/4313-01  Discharging Unit Phone Number:     Emergency Contact:   Extended Emergency Contact Information  Primary Emergency Contact: Solis Osorio   [facility]  Home Phone: [phone]  Relation: Spouse  Secondary Emergency Contact: MichaelKaren   [facility]  Home Phone: [phone]  Relation: Child    Past Surgical History:  Past Surgical History:   Procedure Laterality Date    CARPAL TUNNEL RELEASE Bilateral 2006    COLONOSCOPY  2016    [facility] FAN    ENDOMETRIAL ABLATION      EYE SURGERY  2007    cataract - left - approx date    TUBAL LIGATION         Immunization History:   Immunization History   Administered Date(s) Administered    COVID-19, MODERNA BLUE border, Primary or Immunocompromised, (age 12y+), IM, 100 mcg/0.5mL 2021, 2021, 2021    COVID-19, PFIZER, , (age 12y+), IM, 30mcg/0.3mL 2025    INFLUENZA, INTRADERMAL, QUADRIVALENT, PRESERVATIVE FREE 10/13/2016, 10/05/2017    Influenza 09/10/2012, 2013    Influenza Virus Vaccine 2013, 2014, 10/26/2015    Influenza Whole 10/18/2011    Influenza, FLUAD, (age 65 y+), IM, Quadv, 0.5mL 2023    Influenza, FLUAD, (age 65 y+), IM, Trivalent PF, 0.5mL 2024    Influenza, FLUARIX, FLULAVAL, FLUZONE (age 6 mo+) and AFLURIA, (age 3 y+), Quadv PF, 0.5mL 2019, 10/19/2020    Influenza, FLUCELVAX, (age 6 mo+), MDCK, Quadv PF, 0.5mL 2021, 10/05/2022    Influenza, Intradermal, Preservative free 10/26/2015    Pneumococcal, PCV-13, PREVNAR 13, (age 6w+), IM, 0.5mL 10/26/2015    Pneumococcal, PCV20, PREVNAR 20,

## 2025-07-24 NOTE — PLAN OF CARE
Problem: Safety - Adult  Goal: Free from fall injury  7/24/2025 1004 by Simona Santiago, RN  Outcome: Progressing  7/23/2025 2318 by Jyoti Ocampo RN  Outcome: Progressing  Flowsheets (Taken 7/23/2025 2252)  Free From Fall Injury: Instruct family/caregiver on patient safety     Problem: Chronic Conditions and Co-morbidities  Goal: Patient's chronic conditions and co-morbidity symptoms are monitored and maintained or improved  7/24/2025 1004 by Simona Santiago RN  Outcome: Progressing  7/23/2025 2318 by Jyoti Ocampo RN  Outcome: Progressing  Flowsheets (Taken 7/23/2025 2000)  Care Plan - Patient's Chronic Conditions and Co-Morbidity Symptoms are Monitored and Maintained or Improved: Monitor and assess patient's chronic conditions and comorbid symptoms for stability, deterioration, or improvement     Problem: Discharge Planning  Goal: Discharge to home or other facility with appropriate resources  7/24/2025 1004 by Simona Santiago RN  Outcome: Progressing  7/23/2025 2318 by Jyoti Ocampo RN  Outcome: Progressing  Flowsheets (Taken 7/23/2025 2000)  Discharge to home or other facility with appropriate resources: Identify barriers to discharge with patient and caregiver     Problem: Skin/Tissue Integrity  Goal: Skin integrity remains intact  Description: 1.  Monitor for areas of redness and/or skin breakdown  2.  Assess vascular access sites hourly  3.  Every 4-6 hours minimum:  Change oxygen saturation probe site  4.  Every 4-6 hours:  If on nasal continuous positive airway pressure, respiratory therapy assess nares and determine need for appliance change or resting period  7/24/2025 1004 by Simona Santiago, RN  Outcome: Progressing  7/23/2025 2318 by Jyoti Ocampo RN  Outcome: Progressing  Flowsheets  Taken 7/23/2025 2252  Skin Integrity Remains Intact:   Monitor for areas of redness and/or skin breakdown   Assess vascular access sites hourly  Taken 7/23/2025 2000  Skin Integrity Remains Intact:   Monitor for

## 2025-07-24 NOTE — PLAN OF CARE
Problem: Safety - Adult  Goal: Free from fall injury  7/23/2025 2318 by Jyoti Ocampo RN  Outcome: Progressing  Flowsheets (Taken 7/23/2025 2252)  Free From Fall Injury: Instruct family/caregiver on patient safety  7/23/2025 1006 by Simona Santiago, RN  Outcome: Progressing     Problem: Chronic Conditions and Co-morbidities  Goal: Patient's chronic conditions and co-morbidity symptoms are monitored and maintained or improved  7/23/2025 2318 by Jyoti Ocampo RN  Outcome: Progressing  Flowsheets (Taken 7/23/2025 2000)  Care Plan - Patient's Chronic Conditions and Co-Morbidity Symptoms are Monitored and Maintained or Improved: Monitor and assess patient's chronic conditions and comorbid symptoms for stability, deterioration, or improvement  7/23/2025 1006 by Simona Santiago, RN  Outcome: Progressing     Problem: Discharge Planning  Goal: Discharge to home or other facility with appropriate resources  7/23/2025 2318 by Jyoti Ocampo RN  Outcome: Progressing  Flowsheets (Taken 7/23/2025 2000)  Discharge to home or other facility with appropriate resources: Identify barriers to discharge with patient and caregiver  7/23/2025 1006 by Simona Santiago, RN  Outcome: Progressing     Problem: Skin/Tissue Integrity  Goal: Skin integrity remains intact  Description: 1.  Monitor for areas of redness and/or skin breakdown  2.  Assess vascular access sites hourly  3.  Every 4-6 hours minimum:  Change oxygen saturation probe site  4.  Every 4-6 hours:  If on nasal continuous positive airway pressure, respiratory therapy assess nares and determine need for appliance change or resting period  7/23/2025 2318 by Jyoti Ocampo RN  Outcome: Progressing  Flowsheets  Taken 7/23/2025 2252  Skin Integrity Remains Intact:   Monitor for areas of redness and/or skin breakdown   Assess vascular access sites hourly  Taken 7/23/2025 2000  Skin Integrity Remains Intact:   Monitor for areas of redness and/or skin breakdown   Assess vascular access

## 2025-07-25 LAB
ANION GAP SERPL CALCULATED.3IONS-SCNC: 11 MMOL/L (ref 3–16)
BUN SERPL-MCNC: 12 MG/DL (ref 7–20)
CALCIUM SERPL-MCNC: 7.5 MG/DL (ref 8.3–10.6)
CHLORIDE SERPL-SCNC: 100 MMOL/L (ref 99–110)
CO2 SERPL-SCNC: 21 MMOL/L (ref 21–32)
CREAT SERPL-MCNC: 1.6 MG/DL (ref 0.6–1.2)
GFR SERPLBLD CREATININE-BSD FMLA CKD-EPI: 35 ML/MIN/{1.73_M2}
GLUCOSE BLD-MCNC: 114 MG/DL (ref 70–99)
GLUCOSE BLD-MCNC: 138 MG/DL (ref 70–99)
GLUCOSE BLD-MCNC: 174 MG/DL (ref 70–99)
GLUCOSE BLD-MCNC: 192 MG/DL (ref 70–99)
GLUCOSE SERPL-MCNC: 816 MG/DL (ref 70–99)
PERFORMED ON: ABNORMAL
POTASSIUM SERPL-SCNC: 2.9 MMOL/L (ref 3.5–5.1)
SODIUM SERPL-SCNC: 132 MMOL/L (ref 136–145)

## 2025-07-25 PROCEDURE — 97530 THERAPEUTIC ACTIVITIES: CPT

## 2025-07-25 PROCEDURE — 1200000000 HC SEMI PRIVATE

## 2025-07-25 PROCEDURE — 2500000003 HC RX 250 WO HCPCS: Performed by: INTERNAL MEDICINE

## 2025-07-25 PROCEDURE — 6370000000 HC RX 637 (ALT 250 FOR IP): Performed by: INTERNAL MEDICINE

## 2025-07-25 PROCEDURE — 99232 SBSQ HOSP IP/OBS MODERATE 35: CPT | Performed by: HOSPITALIST

## 2025-07-25 PROCEDURE — 2580000003 HC RX 258: Performed by: INTERNAL MEDICINE

## 2025-07-25 PROCEDURE — 6370000000 HC RX 637 (ALT 250 FOR IP)

## 2025-07-25 PROCEDURE — 36415 COLL VENOUS BLD VENIPUNCTURE: CPT

## 2025-07-25 PROCEDURE — 80048 BASIC METABOLIC PNL TOTAL CA: CPT

## 2025-07-25 PROCEDURE — 6360000002 HC RX W HCPCS: Performed by: INTERNAL MEDICINE

## 2025-07-25 PROCEDURE — 97535 SELF CARE MNGMENT TRAINING: CPT

## 2025-07-25 RX ADMIN — HEPARIN SODIUM 5000 UNITS: 5000 INJECTION INTRAVENOUS; SUBCUTANEOUS at 21:39

## 2025-07-25 RX ADMIN — DEXTROSE MONOHYDRATE AND SODIUM CHLORIDE: 5; .45 INJECTION, SOLUTION INTRAVENOUS at 12:07

## 2025-07-25 RX ADMIN — Medication 400 MG: at 10:03

## 2025-07-25 RX ADMIN — SODIUM CHLORIDE, PRESERVATIVE FREE 10 ML: 5 INJECTION INTRAVENOUS at 20:43

## 2025-07-25 RX ADMIN — DONEPEZIL HYDROCHLORIDE 10 MG: 10 TABLET ORAL at 20:42

## 2025-07-25 RX ADMIN — POTASSIUM CHLORIDE 10 MEQ: 10 INJECTION, SOLUTION INTRAVENOUS at 16:26

## 2025-07-25 RX ADMIN — AMLODIPINE BESYLATE 2.5 MG: 2.5 TABLET ORAL at 10:03

## 2025-07-25 RX ADMIN — INSULIN LISPRO 2 UNITS: 100 INJECTION, SOLUTION INTRAVENOUS; SUBCUTANEOUS at 20:42

## 2025-07-25 RX ADMIN — POTASSIUM CHLORIDE 10 MEQ: 10 INJECTION, SOLUTION INTRAVENOUS at 13:16

## 2025-07-25 RX ADMIN — POTASSIUM CHLORIDE 10 MEQ: 10 INJECTION, SOLUTION INTRAVENOUS at 18:59

## 2025-07-25 RX ADMIN — HEPARIN SODIUM 5000 UNITS: 5000 INJECTION INTRAVENOUS; SUBCUTANEOUS at 06:27

## 2025-07-25 RX ADMIN — POTASSIUM CHLORIDE 10 MEQ: 10 INJECTION, SOLUTION INTRAVENOUS at 17:56

## 2025-07-25 RX ADMIN — SODIUM CHLORIDE, PRESERVATIVE FREE 10 ML: 5 INJECTION INTRAVENOUS at 08:18

## 2025-07-25 RX ADMIN — HEPARIN SODIUM 5000 UNITS: 5000 INJECTION INTRAVENOUS; SUBCUTANEOUS at 16:00

## 2025-07-25 RX ADMIN — ASPIRIN 81 MG: 81 TABLET, COATED ORAL at 10:03

## 2025-07-25 RX ADMIN — POTASSIUM CHLORIDE 10 MEQ: 10 INJECTION, SOLUTION INTRAVENOUS at 14:13

## 2025-07-25 RX ADMIN — POTASSIUM CHLORIDE 10 MEQ: 10 INJECTION, SOLUTION INTRAVENOUS at 15:20

## 2025-07-25 ASSESSMENT — PAIN SCALES - GENERAL: PAINLEVEL_OUTOF10: 0

## 2025-07-25 NOTE — PROGRESS NOTES
[facility] Medicine Progress Note  V 7.24      Date of Admission: 7/18/2025    Hospital Day: 8      Chief Admission Complaint:  Decreased appetite, AMS    Subjective:  Patient seen today morning. No acute concerns. AO3. No fevers, chills, nausea, vomiting, pain anywhere.    Presenting Admission History:       Hospital course:     Viktoriya Osorio is a 69 y.o. female with pmh of hypertension, diabetes, CKD stage III, Alzheimer's dementia, iron deficiency anemia who presents with p.o. intake.  Patient has not been eating and drinking for almost the last 5 days per family and has not been taking her medications.  Patient was admitted with acute hypernatremia with KAI with UTI (urinary tract infection).  Although UA did not grow anything given patient's clinical symptoms was treated for UTI with ceftriaxone patient finished the course.  Patient's hypernatremia improved with IV fluids.  Unfortunately with advanced dementia her intake is very poor and most likely patient will get hypernatremic again .  Palliative care was consulted, patient is a full code    Assessment/Plan:      #Hypernatremia - improved  #KAI on CKD3b - improving  #Lactic acidosis - improved  #UTI - improved  #Sepsis - improved  SIRS: HR, WBC, lactic acid  Plan:  Nephrology consulted, appreciate recommendations  Trending Cr daily  Avoid nephrotoxic medications  IVF as needed  Patient completed course of Ceftriaxone  Continue to monitor    #DM2  #Dementia hx  Alzheimers likely  Plan:  POC glucose  ISS in place  Adjust regimen as needed  Donepezil 10mg in place  Diet as tolerated    Pending placement    Ongoing threat to life and/or bodily function without ongoing treatment due to:  None    Consults:      IP CONSULT TO NEPHROLOGY  IP CONSULT TO PALLIATIVE CARE        --------------------------------------------------      Medications:        Infusion Medications    dextrose 5 % and 0.45 % NaCl 50 mL/hr at 07/25/25 1207    sodium chloride      dextrose

## 2025-07-25 NOTE — PLAN OF CARE
Problem: Safety - Adult  Goal: Free from fall injury  Outcome: Progressing  Flowsheets (Taken 7/25/2025 1835)  Free From Fall Injury: Instruct family/caregiver on patient safety     Problem: Skin/Tissue Integrity  Goal: Skin integrity remains intact  Description: 1.  Monitor for areas of redness and/or skin breakdown  2.  Assess vascular access sites hourly  3.  Every 4-6 hours minimum:  Change oxygen saturation probe site  4.  Every 4-6 hours:  If on nasal continuous positive airway pressure, respiratory therapy assess nares and determine need for appliance change or resting period  Outcome: Progressing  Flowsheets (Taken 7/25/2025 1835)  Skin Integrity Remains Intact: Monitor for areas of redness and/or skin breakdown     Problem: Confusion  Goal: Confusion, delirium, dementia, or psychosis is improved or at baseline  Description: INTERVENTIONS:  1. Assess for possible contributors to thought disturbance, including medications, impaired vision or hearing, underlying metabolic abnormalities, dehydration, psychiatric diagnoses, and notify attending LIP  2. [place] high risk fall precautions, as indicated  3. Provide frequent short contacts to provide reality reorientation, refocusing and direction  4. Decrease environmental stimuli, including noise as appropriate  5. Monitor and intervene to maintain adequate nutrition, hydration, elimination, sleep and activity  6. If unable to ensure safety without constant attention obtain sitter and review sitter guidelines with assigned personnel  7. Initiate Psychosocial CNS and Spiritual Care consult, as indicated  Outcome: Progressing  Flowsheets (Taken 7/25/2025 1835)  Effect of thought disturbance (confusion, delirium, dementia, or psychosis) are managed with adequate functional status:   Assess for contributors to thought disturbance, including medications, impaired vision or hearing, underlying metabolic abnormalities, dehydration, psychiatric diagnoses, notify LIP

## 2025-07-25 NOTE — PLAN OF CARE
Problem: Safety - Adult  Goal: Free from fall injury  7/24/2025 2234 by Jacqui Velasco RN  Outcome: Progressing  Flowsheets (Taken 7/24/2025 2234)  Free From Fall Injury: Instruct family/caregiver on patient safety     Problem: Chronic Conditions and Co-morbidities  Goal: Patient's chronic conditions and co-morbidity symptoms are monitored and maintained or improved  7/24/2025 2234 by Jacqui Velasco RN  Outcome: Progressing  Flowsheets (Taken 7/23/2025 2000 by Jyoti Ocampo RN)  Care Plan - Patient's Chronic Conditions and Co-Morbidity Symptoms are Monitored and Maintained or Improved: Monitor and assess patient's chronic conditions and comorbid symptoms for stability, deterioration, or improvement     Problem: Discharge Planning  Goal: Discharge to home or other facility with appropriate resources  7/24/2025 2234 by Jacqui Velasco RN  Outcome: Progressing  Flowsheets (Taken 7/24/2025 2234)  Discharge to home or other facility with appropriate resources:   Identify barriers to discharge with patient and caregiver   Arrange for needed discharge resources and transportation as appropriate   Identify discharge learning needs (meds, wound care, etc)   Refer to discharge planning if patient needs post-hospital services based on physician order or complex needs related to functional status, cognitive ability or social support system     Problem: Skin/Tissue Integrity  Goal: Skin integrity remains intact  Description: 1.  Monitor for areas of redness and/or skin breakdown  2.  Assess vascular access sites hourly  3.  Every 4-6 hours minimum:  Change oxygen saturation probe site  4.  Every 4-6 hours:  If on nasal continuous positive airway pressure, respiratory therapy assess nares and determine need for appliance change or resting period  7/24/2025 2234 by Jacqui Velasco RN  Outcome: Progressing  Flowsheets (Taken 7/24/2025 2234)  Skin Integrity Remains Intact:   Monitor for areas of redness and/or skin  refocusing and direction   Decrease environmental stimuli, including noise as appropriate   Monitor and intervene to maintain adequate nutrition, hydration, elimination, sleep and activity  7/24/2025 1004 by Simona Santiago, RN  Outcome: Not Progressing     Problem: Nutrition Deficit:  Goal: Optimize nutritional status  7/24/2025 2234 by Jacqui Velasco RN  Outcome: Progressing  Flowsheets (Taken 7/24/2025 2234)  Nutrient intake appropriate for improving, restoring, or maintaining nutritional needs:   Assess nutritional status and recommend course of action   Monitor oral intake, labs, and treatment plans   Recommend appropriate diets, oral nutritional supplements, and vitamin/mineral supplements   Order, calculate, and assess calorie counts as needed   Provide specific nutrition education to patient or family as appropriate  7/24/2025 1004 by Simona Santiago, RN  Outcome: Not Progressing

## 2025-07-25 NOTE — CARE COORDINATION
Discharge planning update:    Plan is to go to SNF.  Working with sister facilities affiliated with [facility] so patient can try to transition to this facility after snf.  Accepted at [facility].      Unable to get in touch with patient's  ([facility]).  Briefly spoke with daughterLu who will call me back.  If [place] is acceptable to family, will start pre-cert.  If not, will start pre-cert on one of the other two accepting facilities   1) [facility]  2) [facility]    UPDATE 14:00    Spoke with Daughter Angélica.  Family decided on [facility].  Pre-started this afternoon.

## 2025-07-25 NOTE — PROGRESS NOTES
Comprehensive Nutrition Assessment    RECOMMENDATIONS:  PO Diet: Minced and moist per SLP recs  Nutrition Supplement: Continue Glucerna BID  Nutrition Education: Education/Counseling not indicated     NUTRITION ASSESSMENT:   Nutritional summary & status: Follow up. Patient has had minimal po intake since last assessment with refusal of lunch and SLP yesterday. Patient was downgraded to dysphagia minced and moist per SLP recs and continues with inadequate po intake. Patient is a full code however with dementia dx enteral feeds/PEG placement may not be appropriate or beneficial for improvement in QOL. Family has chosen a SNF and patient is medically d/c while awaiting precert acceptance. RD will continue to monitor patient and family goals of care and provide intervention as appropriate.     Admission // PMH: UTI // hypertension, diabetes, CKD stage III, Alzheimer's dementia, iron deficiency anemia    MALNUTRITION ASSESSMENT  Context of Malnutrition: Acute Illness   Malnutrition Status: Moderate malnutrition  Findings of the 6 clinical characteristics of malnutrition (Minimum of 2 out of 6 clinical characteristics is required to make the diagnosis of moderate or severe Protein Calorie Malnutrition based on AND/ASPEN Guidelines):  Energy Intake:  75% or less of estimated energy requirements for 7 or more days  Weight Loss:  Greater than 5% over 1 month (12% in 1 month)     Body Fat Loss:  No body fat loss     Muscle Mass Loss:  No muscle mass loss    Fluid Accumulation:  No fluid accumulation     Strength:  Not Performed    NUTRITION DIAGNOSIS   Moderate malnutrition, in context of acute illness or injury related to inadequate protein-energy intake as evidenced by criteria as identified in malnutrition assessment    Nutrition Monitoring and Evaluation:   Food/Nutrient Intake Outcomes:  Food and Nutrient Intake, Supplement Intake, Diet Advancement/Tolerance  Physical Signs/Symptoms Outcomes:  Biochemical Data,

## 2025-07-25 NOTE — PROGRESS NOTES
Occupational Therapy  Facility/Department: [facility]  Daily Treatment Note  NAME: Viktoriya Osorio  : 1955  MRN: [id number]    Date of Service: 2025    Discharge Recommendations:  Subacute/Skilled Nursing Facility, Continue to assess pending progress  OT Equipment Recommendations  Equipment Needed: No  Other: defer      Patient Diagnosis(es): The primary encounter diagnosis was Acute cystitis without hematuria. A diagnosis of Decreased oral intake was also pertinent to this visit.     Assessment   Assessment: Pt tolerates session fair this date. She completes bed mobility, STS, and house/community distance gait with RW and CGA. Limited by decreased cognition requiring simple step commands and re-direction. Pt would benefit from cont skilled inpt therapy to max progress towards PLOF.  Activity Tolerance: Treatment limited secondary to decreased cognition  Discharge Recommendations: Subacute/Skilled Nursing Facility;Continue to assess pending progress  Equipment Needed: No  Other: defer     Plan  Occupational Therapy Plan  Times Per Week: 2-5  Current Treatment Recommendations: Functional mobility training;Endurance training;Self-Care / ADL;Cognitive reorientation;Balance training;Patient/Caregiver education & training;Safety education & training    Restrictions  Position Activity Restriction  Other Position/Activity Restrictions: Up as tolerated    Subjective  Subjective  Subjective: Pt presents supine in bed. Agreeable and motivated for session. Does not report pain.  Orientation  Overall Orientation Status: Impaired  Orientation Level: Disoriented to place;Disoriented to time;Disoriented to situation;Disoriented to person  Cognition  Overall Cognitive Status: Exceptions  Arousal/Alertness: Delayed responses to stimuli  Following Commands: Inconsistently follows commands;Impaired  Attention Span: Impaired  Memory: Impaired  Safety Judgement: Impaired  Problem Solving: Impaired  Insights:

## 2025-07-25 NOTE — CARE COORDINATION
2:46 PM  Uploaded requested notes from [facility] fax  Electronically signed by Kimber Walker on 7/25/2025 at 2:47 PM

## 2025-07-25 NOTE — PROGRESS NOTES
Physical Therapy  Refusal   Attempted to work with pt his PM however pt adamantly refusing PT at this time despite encouragement. PT will attempt to work with pt on later date as appropriate and as schedule permits.      Alexi Hammond, PT, DPT

## 2025-07-25 NOTE — CARE COORDINATION
1:37 PM  The authorization request [id number] has been submitted. The authorization is pending for clinical review.   Precert submitted prcert pending CM aware  Electronically signed by Kimber Walker on 7/25/2025 at 1:38 PM

## 2025-07-25 NOTE — PROGRESS NOTES
Renal Progress Note  Subjective:     07/25/25      Assessment/Plan:    # Hypernatremia   - Resolved   - Secondary to poor PO intake   - Improved s/p IVF  - Monitor     # Hypokalemia, hypomagnesemia   - Resolved   - Likely nutritional / d5w infusion   - Monitor     # KAI on CKD 3b  - Creatinine baseline 1.3-1.6  - Presented with creatinine of 1.8 --> now up to 2.1  - Suspect secondary to poor PO intake and hypercalcemia   - Avoid nephrotoxins  - Continue to monitor     # HTN  - Added Amlodipine 2.5 mg   - Monitor     # Alzheimer's dementia  - Per primary     # UTI  - Abx management per primary       HPI   Viktoriya Osorio is a 69 y.o. female presented to   the hospital on 7/18/2025 with past history of acute discomfort hypertension hyperlipidemia presented with several days of poor p.o. intake, altered mental status failure to thrive.  Came to the emergency room and was found to have UTI and on antibiotics now  Also lactic acidosis on presentation  On IV fluids   consulted for hypernatremia and CKD     Interval History:     Creatinine up yesterday  BMP pending today  On IVF  BP's controlled  On RA      DIET ADULT ORAL NUTRITION SUPPLEMENT; Lunch, Dinner; Diabetic Oral Supplement  ADULT DIET; Dysphagia - Minced and Moist  Medications:   Scheduled Meds:   donepezil  10 mg Oral Nightly    amLODIPine  2.5 mg Oral Daily    magnesium oxide  400 mg Oral Daily    heparin (porcine)  5,000 Units SubCUTAneous 3 times per day    sodium chloride flush  5-40 mL IntraVENous 2 times per day    insulin lispro  0-8 Units SubCUTAneous 4x Daily AC & HS    aspirin  81 mg Oral Daily     Continuous Infusions:   dextrose 5 % and 0.45 % NaCl 50 mL/hr at 07/25/25 1207    sodium chloride      dextrose         Labs:  CBC:   No results for input(s): \"WBC\", \"HGB\", \"PLT\" in the last 72 hours.    BMP:    Recent Labs     07/23/25  0512 07/24/25  0359 07/25/25  1134    142 132*   K 3.7 3.8 2.9*    105 100   CO2 26 26 21   BUN 20 19 12

## 2025-07-26 VITALS
HEART RATE: 74 BPM | TEMPERATURE: 97.3 F | BODY MASS INDEX: 21.69 KG/M2 | HEIGHT: 61 IN | SYSTOLIC BLOOD PRESSURE: 143 MMHG | OXYGEN SATURATION: 100 % | WEIGHT: 114.86 LBS | DIASTOLIC BLOOD PRESSURE: 73 MMHG | RESPIRATION RATE: 18 BRPM

## 2025-07-26 LAB
ANION GAP SERPL CALCULATED.3IONS-SCNC: 10 MMOL/L (ref 3–16)
BUN SERPL-MCNC: 13 MG/DL (ref 7–20)
CALCIUM SERPL-MCNC: 9 MG/DL (ref 8.3–10.6)
CHLORIDE SERPL-SCNC: 107 MMOL/L (ref 99–110)
CO2 SERPL-SCNC: 24 MMOL/L (ref 21–32)
CREAT SERPL-MCNC: 1.8 MG/DL (ref 0.6–1.2)
GFR SERPLBLD CREATININE-BSD FMLA CKD-EPI: 30 ML/MIN/{1.73_M2}
GLUCOSE BLD-MCNC: 112 MG/DL (ref 70–99)
GLUCOSE BLD-MCNC: 159 MG/DL (ref 70–99)
GLUCOSE SERPL-MCNC: 116 MG/DL (ref 70–99)
PERFORMED ON: ABNORMAL
PERFORMED ON: ABNORMAL
POTASSIUM SERPL-SCNC: 4.3 MMOL/L (ref 3.5–5.1)
SODIUM SERPL-SCNC: 141 MMOL/L (ref 136–145)

## 2025-07-26 PROCEDURE — 2580000003 HC RX 258: Performed by: INTERNAL MEDICINE

## 2025-07-26 PROCEDURE — 6360000002 HC RX W HCPCS: Performed by: INTERNAL MEDICINE

## 2025-07-26 PROCEDURE — 2500000003 HC RX 250 WO HCPCS: Performed by: INTERNAL MEDICINE

## 2025-07-26 PROCEDURE — 6370000000 HC RX 637 (ALT 250 FOR IP): Performed by: INTERNAL MEDICINE

## 2025-07-26 PROCEDURE — 36415 COLL VENOUS BLD VENIPUNCTURE: CPT

## 2025-07-26 PROCEDURE — 99232 SBSQ HOSP IP/OBS MODERATE 35: CPT | Performed by: HOSPITALIST

## 2025-07-26 PROCEDURE — 80048 BASIC METABOLIC PNL TOTAL CA: CPT

## 2025-07-26 RX ADMIN — ASPIRIN 81 MG: 81 TABLET, COATED ORAL at 09:05

## 2025-07-26 RX ADMIN — Medication 400 MG: at 09:05

## 2025-07-26 RX ADMIN — AMLODIPINE BESYLATE 2.5 MG: 2.5 TABLET ORAL at 09:05

## 2025-07-26 RX ADMIN — ZIPRASIDONE MESYLATE 10 MG: 20 INJECTION, POWDER, LYOPHILIZED, FOR SOLUTION INTRAMUSCULAR at 01:08

## 2025-07-26 RX ADMIN — HEPARIN SODIUM 5000 UNITS: 5000 INJECTION INTRAVENOUS; SUBCUTANEOUS at 14:05

## 2025-07-26 RX ADMIN — HEPARIN SODIUM 5000 UNITS: 5000 INJECTION INTRAVENOUS; SUBCUTANEOUS at 06:04

## 2025-07-26 RX ADMIN — DEXTROSE MONOHYDRATE AND SODIUM CHLORIDE: 5; .45 INJECTION, SOLUTION INTRAVENOUS at 06:03

## 2025-07-26 RX ADMIN — SODIUM CHLORIDE, PRESERVATIVE FREE 10 ML: 5 INJECTION INTRAVENOUS at 09:05

## 2025-07-26 ASSESSMENT — PAIN SCALES - GENERAL
PAINLEVEL_OUTOF10: 0
PAINLEVEL_OUTOF10: 0

## 2025-07-26 NOTE — PROGRESS NOTES
IV was removed, no complications.Transport picked up Pt at 429p. Report was called to [place] at 513p. All questions were answered and unit phone number was given for follow up questions as needed.    Electronically signed by Alexandra Tamayo RN on 7/26/2025 at 5:53 PM

## 2025-07-26 NOTE — PROGRESS NOTES
Renal Progress Note  Subjective:     07/26/25      Assessment/Plan:    # KAI on CKD 3b  - Creatinine baseline 1.8  - On gentle IV fluids as creatinine trend up when she is not on IV fluids   - Suspect secondary to poor PO intake and hypercalcemia   - Avoid nephrotoxins  - Continue to monitor     # Hypernatremia   - Better  - Secondary to poor PO intake   - Improved s/p IVF  - Monitor     # Hypokalemia, hypomagnesemia   - Resolved   - Likely nutritional / d5w infusion   - Monitor     # HTN  - Amlodipine 2.5 mg   - Monitor     # Alzheimer's dementia  - Per primary     # UTI  - Abx management per primary       HPI   Viktoriya Osorio is a 69 y.o. female presented to   the hospital on 7/18/2025 with past history of acute discomfort hypertension hyperlipidemia presented with several days of poor p.o. intake, altered mental status failure to thrive.  Came to the emergency room and was found to have UTI and on antibiotics now  Also lactic acidosis on presentation  On IV fluids   consulted for hypernatremia and CKD     Interval History:     Daughter Lu at bedside   Creatinine fluctuates   On IVF  BP's controlled  On RA      DIET ADULT ORAL NUTRITION SUPPLEMENT; Lunch, Dinner; Diabetic Oral Supplement  ADULT DIET; Dysphagia - Minced and Moist  Medications:   Scheduled Meds:   donepezil  10 mg Oral Nightly    amLODIPine  2.5 mg Oral Daily    magnesium oxide  400 mg Oral Daily    heparin (porcine)  5,000 Units SubCUTAneous 3 times per day    sodium chloride flush  5-40 mL IntraVENous 2 times per day    insulin lispro  0-8 Units SubCUTAneous 4x Daily AC & HS    aspirin  81 mg Oral Daily     Continuous Infusions:   dextrose 5 % and 0.45 % NaCl 50 mL/hr at 07/26/25 0603    sodium chloride      dextrose         Labs:  CBC:   No results for input(s): \"WBC\", \"HGB\", \"PLT\" in the last 72 hours.    BMP:    Recent Labs     07/24/25  0359 07/25/25  1134 07/26/25  0541    132* 141   K 3.8 2.9* 4.3    100 107   CO2 26 21

## 2025-07-26 NOTE — CARE COORDINATION
DISCHARGE PLANNIN  Pre-cert for [facility] started on . Auth # [id number]   I checked this morning and the status is PENDING.    We just received a voicemail from the insurance company stating they are offering a P2P for SNF admission. MD to call [phone] by 11am CST today.   Information sent to Dr. Vernon.    UPDATE: 5341  Dr. Vernon stated he completed the P2P and it was approved.  I checked [facility] and it's still pending at this time. Will check again this afternoon to see if it's updated.    UPDATE: 5222  Pre-cert approved.    -     CM team will continue to follow.  Marita Cabello RN Case Manager  [phone]

## 2025-07-26 NOTE — CARE COORDINATION
Case Management Assessment            Discharge Note                    Date / Time of Note: 7/26/2025 2:12 PM                  Discharge Note Completed by: MERCEDES MARTE    Patient Name: Viktoriya Osorio   YOB: 1955  Diagnosis: UTI (urinary tract infection) [N39.0]  Decreased oral intake [R63.8]  Acute cystitis without hematuria [N30.00]   Date / Time: 7/18/2025  7:24 PM    Current PCP: Jessica Anderson MD  Clinic patient: No    Hospitalization in the last 30 days: No       Advance Directives:  Code Status: Full Code  [place] DNR form completed and on chart: Not Indicated    Financial:  Payor: [place] BCBS / Plan: [place]BS - [place] PPO / Product Type: *No Product type* /      Pharmacy:    [facility] [address] [place] [address] - [address] - P [phone] - F [phone]  [address]  [facility] [address]  Phone: [phone] Fax: [phone]      Assistance purchasing medications?: Potential Assistance Purchasing Medications: No  Assistance provided by Case Management: None at this time    Does patient want to participate in local refill/ meds to beds program?: No    Meds To Beds General Rules:  1. Can ONLY be done Monday- Friday between 8:30am-5pm  2. Prescription(s) must be in pharmacy by 3pm to be filled same day  3.Copy of patient's insurance/ prescription drug card and patient face sheet must be sent along with the prescription(s)  4. Cost of Rx cannot be added to hospital bill. If financial assistance is needed, please contact unit  or ;  or  CANNOT provide pharmacy voucher for patients co-pays  5. Patients can then  the prescription on their way out of the hospital at discharge, or pharmacy can deliver to the bedside if staff is available. (payment due at time of pick-up or delivery - cash, check, or card accepted)     Able to afford home medications/ co-pay costs: Yes    ADLS:  Current PT AM-PAC Score: 16 /24  Current OT AM-PAC

## 2025-07-26 NOTE — PROGRESS NOTES
[facility] Medicine Progress Note  V 7.24      Date of Admission: 7/18/2025    Hospital Day: 9      Chief Admission Complaint:  Decreased appetite, AMS     Subjective: Patient was seen and examined by the bedside. Patient's daughters and sister were present by the bedside. Patient is non-verbal, unable to complete ROS.   No acute events reported overnight.     Presenting Admission History:        Hospital course:     Viktoriya Osorio is a 69 y.o. female with pmh of hypertension, diabetes, CKD stage III, Alzheimer's dementia, iron deficiency anemia who presents with p.o. intake.  Patient has not been eating and drinking for almost the last 5 days per family and has not been taking her medications.  Patient was admitted with acute hypernatremia with KAI with UTI (urinary tract infection).  Although UA did not grow anything given patient's clinical symptoms was treated for UTI with ceftriaxone patient finished the course.  Patient's hypernatremia improved with IV fluids.  Unfortunately with advanced dementia her intake is very poor and most likely patient will get hypernatremic again .  Palliative care was consulted, patient is a full code     Assessment/Plan:       #Hypernatremia - improved  #KAI on CKD3b - improving  #Lactic acidosis - improved  #UTI - improved  #Sepsis - improved  SIRS: HR, WBC, lactic acid  Plan:  Nephrology consulted, appreciate recommendations  Trending Cr daily  Avoid nephrotoxic medications  IVF as needed  Patient completed course of Ceftriaxone  Continue to monitor    #DM2  #Dementia hx  Alzheimers likely  Plan:  POC glucose  ISS in place  Adjust regimen as needed  Donepezil 10mg in place  Diet as tolerated     07/26: Pending placement, completed peer to peer call today, spending more than 25 minutes.    Placement was approved by insurance on peer to peer call.     Ongoing threat to life and/or bodily function without ongoing treatment due to:  None    Consults:      IP CONSULT TO  on 7/26/2025 with the following recs:     Anticipated Discharge Location: [facility]     Anticipated Discharge Day/Date: 7/24/2025    Barriers to Discharge: Placement decision    Likely rate limiting factor: Pending placement    --------------------------------------------------    MDM (any 2 required for High level billing)    A. Problems (any 1)  [x] Acute/Chronic Illness/injury posing ongoing threat to life and/or bodily function without ongoing treatment    [] Severe exacerbation of chronic illness    --------------------------------------------------  B. Risk of Treatment (any 1)    [] Drugs/treatments that require intensive monitoring for toxicity    [] IV ABX (Vancomycin, Aminoglycosides, etc)     [] Post-Cath/Contrast study requiring serial monitoring    [] IV Narcotic analgesia    [] Aggressive IV diuresis    [] Hypertonic Saline    [] Critical electrolyte abnormalities requiring IV replacement    [] Insulin - Scheduled/SSI or Insulin gtt    [] Anticoagulation (Heparin gtt or Coumadin - other anticoagulants in special circumstances)    [] Cardiac Medications (IV Amiodarone/Diltiazem, Tikosyn, etc)    [] Hemodialysis    [] Other -    [] Change in code status    [] Decision to escalate care    [] Major surgery/procedure with associated risk factors    --------------------------------------------------  C. Data (any 2)    [x] Data Review (any 3)    [x] Consultant/subspecialist notes from yesterday/today    [x] All available current labs reviewed interpreted for clinical significance    [x] Appropriate follow-up labs were ordered  [] Collateral history obtained     [] Independent Interpretation of tests (any 1)    [] Telemetry (Rhythm Strip) personally reviewed and interpreted        [] Imaging personally reviewed and interpreted     [x] Discussion (any 1)  [x] Multi-Disciplinary Rounds with Case Management  [] Discussed management of the case with           Labs:  Personally reviewed on 7/26/2025 and

## 2025-07-26 NOTE — PLAN OF CARE
Problem: Safety - Adult  Goal: Free from fall injury  7/26/2025 0257 by Alejandra Marroquin, RN  Outcome: Progressing  Flowsheets (Taken 7/25/2025 1835 by Love Steinberg, RN)  Free From Fall Injury: Instruct family/caregiver on patient safety  7/25/2025 1835 by Love Steinberg, RN  Outcome: Progressing  Flowsheets (Taken 7/25/2025 1835)  Free From Fall Injury: Instruct family/caregiver on patient safety     Problem: Chronic Conditions and Co-morbidities  Goal: Patient's chronic conditions and co-morbidity symptoms are monitored and maintained or improved  Outcome: Progressing  Flowsheets (Taken 7/23/2025 2000 by Jyoti Ocampo, RN)  Care Plan - Patient's Chronic Conditions and Co-Morbidity Symptoms are Monitored and Maintained or Improved: Monitor and assess patient's chronic conditions and comorbid symptoms for stability, deterioration, or improvement     Problem: Discharge Planning  Goal: Discharge to home or other facility with appropriate resources  Outcome: Progressing  Flowsheets (Taken 7/24/2025 2234 by Jacqui Velasco, RN)  Discharge to home or other facility with appropriate resources:   Identify barriers to discharge with patient and caregiver   Arrange for needed discharge resources and transportation as appropriate   Identify discharge learning needs (meds, wound care, etc)   Refer to discharge planning if patient needs post-hospital services based on physician order or complex needs related to functional status, cognitive ability or social support system     Problem: Skin/Tissue Integrity  Goal: Skin integrity remains intact  Description: 1.  Monitor for areas of redness and/or skin breakdown  2.  Assess vascular access sites hourly  3.  Every 4-6 hours minimum:  Change oxygen saturation probe site  4.  Every 4-6 hours:  If on nasal continuous positive airway pressure, respiratory therapy assess nares and determine need for appliance change or resting period  7/25/2025 1835 by Love Steinberg, RN  Outcome:

## 2025-07-26 NOTE — PLAN OF CARE
Problem: Safety - Adult  Goal: Free from fall injury  7/26/2025 0927 by Alexandra Tamayo, RN  Outcome: Progressing  Flowsheets (Taken 7/26/2025 0926)  Free From Fall Injury:   Instruct family/caregiver on patient safety   Based on caregiver fall risk screen, instruct family/caregiver to ask for assistance with transferring infant if caregiver noted to have fall risk factors  Note: Pt is compliant with safety measruses, bed alarm, gripper socks and call light used this shift.      Problem: Chronic Conditions and Co-morbidities  Goal: Patient's chronic conditions and co-morbidity symptoms are monitored and maintained or improved  7/26/2025 0927 by Alexandra Tamayo, RN  Outcome: Progressing  Flowsheets (Taken 7/23/2025 2000 by Jyoti Ocampo, RN)  Care Plan - Patient's Chronic Conditions and Co-Morbidity Symptoms are Monitored and Maintained or Improved: Monitor and assess patient's chronic conditions and comorbid symptoms for stability, deterioration, or improvement  Note: Blood suger, vitals and assessement all baseline, pt is still only oriented to self, knows name and date of birthday only.     Problem: Discharge Planning  Goal: Discharge to home or other facility with appropriate resources  7/26/2025 0927 by Alexandra Tamayo, RN  Outcome: Progressing  Flowsheets (Taken 7/24/2025 2234 by Jacqui Velasco, RN)  Discharge to home or other facility with appropriate resources:   Identify barriers to discharge with patient and caregiver   Arrange for needed discharge resources and transportation as appropriate   Identify discharge learning needs (meds, wound care, etc)   Refer to discharge planning if patient needs post-hospital services based on physician order or complex needs related to functional status, cognitive ability or social support system  Note: Pt progressing to discharge to skilled nursing this shift.      Problem: Skin/Tissue Integrity  Goal: Skin integrity remains intact  Description: 1.  Monitor for areas of  redness and/or skin breakdown  2.  Assess vascular access sites hourly  3.  Every 4-6 hours minimum:  Change oxygen saturation probe site  4.  Every 4-6 hours:  If on nasal continuous positive airway pressure, respiratory therapy assess nares and determine need for appliance change or resting period  Outcome: Progressing  Flowsheets  Taken 7/26/2025 0927  Skin Integrity Remains Intact: Monitor for areas of redness and/or skin breakdown  Taken 7/26/2025 0926  Skin Integrity Remains Intact:   Monitor for areas of redness and/or skin breakdown   Monitor skin under medical devices   Check visual cues for pain  Note: Pt is frequently found laying on her left side, will repositions easily as directed every 2 hours this shift.      Problem: Confusion  Goal: Confusion, delirium, dementia, or psychosis is improved or at baseline  Description: INTERVENTIONS:  1. Assess for possible contributors to thought disturbance, including medications, impaired vision or hearing, underlying metabolic abnormalities, dehydration, psychiatric diagnoses, and notify attending LIP  2. [place] high risk fall precautions, as indicated  3. Provide frequent short contacts to provide reality reorientation, refocusing and direction  4. Decrease environmental stimuli, including noise as appropriate  5. Monitor and intervene to maintain adequate nutrition, hydration, elimination, sleep and activity  6. If unable to ensure safety without constant attention obtain sitter and review sitter guidelines with assigned personnel  7. Initiate Psychosocial CNS and Spiritual Care consult, as indicated  7/26/2025 0927 by Alexandra Tamayo, RN  Outcome: Progressing  Flowsheets (Taken 7/25/2025 1835 by Love Steinberg, RN)  Effect of thought disturbance (confusion, delirium, dementia, or psychosis) are managed with adequate functional status:   Assess for contributors to thought disturbance, including medications, impaired vision or hearing, underlying metabolic

## 2025-07-31 NOTE — DISCHARGE SUMMARY
Hospital Medicine Discharge Summary    Patient: Viktoriya Osorio   : 1955     Hospital:  [facility]  Admit Date: 2025   Discharge Date: 2025    Disposition:  Skilled Nursing Facility (SNF): [facility]    Code status:  Full  Condition at Discharge: Stable  Primary Care Provider: Jessica Anderson MD    Admitting Provider: Marcus Brooks MD  Discharge Provider: Yehuda Vernon MD     Discharge Diagnoses:      Active Hospital Problems    Diagnosis     Encounter for palliative care [Z51.5]     Decreased oral intake [R63.8]     Hypokalemia [E87.6]     Hypomagnesemia [E83.42]     Hypernatremia [E87.0]     UTI (urinary tract infection) [N39.0]     Primary hypertension [I10]        Presenting Admission History:      Viktoriya Osorio is a 69 y.o. female with pmh of hypertension, diabetes, CKD stage III, Alzheimer's dementia, iron deficiency anemia who presents with p.o. intake.  Patient has not been eating and drinking for almost the last 5 days per family and has not been taking her medications.  Patient was admitted with acute hypernatremia with KAI with UTI (urinary tract infection).  Although UA did not grow anything given patient's clinical symptoms was treated for UTI with ceftriaxone patient finished the course.  Patient's hypernatremia improved with IV fluids.  Unfortunately with advanced dementia her intake is very poor and most likely patient will get hypernatremic again .  Palliative care was consulted, patient is a full code     Assessment/Plan:       #Hypernatremia - improved  #KAI on CKD3b - improving  #Lactic acidosis - improved  #UTI - improved  #Sepsis - improved  SIRS: HR, WBC, lactic acid  Plan:  Nephrology consulted, appreciate recommendations  Trending Cr daily  Avoid nephrotoxic medications  IVF as needed  Patient completed course of Ceftriaxone  Continue to monitor    #DM2  #Dementia hx  Alzheimers likely  Plan:  POC glucose  ISS in place  Adjust

## 2025-08-17 PROBLEM — N39.0 UTI (URINARY TRACT INFECTION): Status: RESOLVED | Noted: 2025-07-18 | Resolved: 2025-08-17
